# Patient Record
Sex: FEMALE | Race: WHITE | Employment: OTHER
[De-identification: names, ages, dates, MRNs, and addresses within clinical notes are randomized per-mention and may not be internally consistent; named-entity substitution may affect disease eponyms.]

---

## 2017-01-04 ENCOUNTER — OFFICE VISIT (OUTPATIENT)
Dept: PODIATRY | Facility: CLINIC | Age: 67
End: 2017-01-04

## 2017-01-04 VITALS
HEIGHT: 70 IN | SYSTOLIC BLOOD PRESSURE: 125 MMHG | WEIGHT: 180 LBS | HEART RATE: 82 BPM | BODY MASS INDEX: 25.77 KG/M2 | DIASTOLIC BLOOD PRESSURE: 75 MMHG

## 2017-01-04 DIAGNOSIS — M84.374A STRESS FRACTURE OF METATARSAL BONE, RIGHT, INITIAL ENCOUNTER: Primary | ICD-10-CM

## 2017-01-04 DIAGNOSIS — M85.80 OSTEOPENIA: ICD-10-CM

## 2017-01-04 DIAGNOSIS — Z87.312 HISTORY OF STRESS FRACTURE: ICD-10-CM

## 2017-01-04 PROCEDURE — 73630 X-RAY EXAM OF FOOT: CPT | Performed by: PODIATRIST

## 2017-01-04 PROCEDURE — 99213 OFFICE O/P EST LOW 20 MIN: CPT | Performed by: PODIATRIST

## 2017-01-04 ASSESSMENT — ENCOUNTER SYMPTOMS
COLOR CHANGE: 0
DIARRHEA: 0
VOMITING: 0
CONSTIPATION: 0
NAUSEA: 0

## 2017-01-25 ENCOUNTER — OFFICE VISIT (OUTPATIENT)
Dept: PODIATRY | Facility: CLINIC | Age: 67
End: 2017-01-25

## 2017-01-25 VITALS
HEART RATE: 78 BPM | BODY MASS INDEX: 25.77 KG/M2 | DIASTOLIC BLOOD PRESSURE: 77 MMHG | SYSTOLIC BLOOD PRESSURE: 119 MMHG | WEIGHT: 180 LBS | HEIGHT: 70 IN

## 2017-01-25 DIAGNOSIS — M84.374A STRESS FRACTURE OF METATARSAL BONE, RIGHT, INITIAL ENCOUNTER: Primary | ICD-10-CM

## 2017-01-25 DIAGNOSIS — M85.80 OSTEOPENIA: ICD-10-CM

## 2017-01-25 PROCEDURE — 99213 OFFICE O/P EST LOW 20 MIN: CPT | Performed by: PODIATRIST

## 2017-01-25 PROCEDURE — 1123F ACP DISCUSS/DSCN MKR DOCD: CPT | Performed by: PODIATRIST

## 2017-01-25 PROCEDURE — G8484 FLU IMMUNIZE NO ADMIN: HCPCS | Performed by: PODIATRIST

## 2017-01-25 PROCEDURE — 3014F SCREEN MAMMO DOC REV: CPT | Performed by: PODIATRIST

## 2017-01-25 PROCEDURE — G8399 PT W/DXA RESULTS DOCUMENT: HCPCS | Performed by: PODIATRIST

## 2017-01-25 PROCEDURE — 1090F PRES/ABSN URINE INCON ASSESS: CPT | Performed by: PODIATRIST

## 2017-01-25 PROCEDURE — 4040F PNEUMOC VAC/ADMIN/RCVD: CPT | Performed by: PODIATRIST

## 2017-01-25 PROCEDURE — 1036F TOBACCO NON-USER: CPT | Performed by: PODIATRIST

## 2017-01-25 PROCEDURE — 3017F COLORECTAL CA SCREEN DOC REV: CPT | Performed by: PODIATRIST

## 2017-01-25 PROCEDURE — G8427 DOCREV CUR MEDS BY ELIG CLIN: HCPCS | Performed by: PODIATRIST

## 2017-01-25 PROCEDURE — G8420 CALC BMI NORM PARAMETERS: HCPCS | Performed by: PODIATRIST

## 2017-01-25 ASSESSMENT — ENCOUNTER SYMPTOMS
NAUSEA: 0
VOMITING: 0
CONSTIPATION: 0
COLOR CHANGE: 0
DIARRHEA: 0

## 2017-02-07 ENCOUNTER — TELEPHONE (OUTPATIENT)
Dept: INTERNAL MEDICINE | Facility: CLINIC | Age: 67
End: 2017-02-07

## 2017-02-07 DIAGNOSIS — Z93.4 JEJUNOSTOMY TUBE PRESENT (HCC): Primary | ICD-10-CM

## 2017-02-07 DIAGNOSIS — Z93.4 SMALL BOWEL TUBE FEEDING (HCC): ICD-10-CM

## 2017-02-07 DIAGNOSIS — Z93.1 GASTROINTESTINAL TUBE PRESENT (HCC): ICD-10-CM

## 2017-02-22 ENCOUNTER — TELEPHONE (OUTPATIENT)
Dept: INTERNAL MEDICINE | Facility: CLINIC | Age: 67
End: 2017-02-22

## 2017-02-23 ENCOUNTER — TELEPHONE (OUTPATIENT)
Dept: INTERNAL MEDICINE | Facility: CLINIC | Age: 67
End: 2017-02-23

## 2017-03-03 DIAGNOSIS — K31.84 GASTROPARESIS: ICD-10-CM

## 2017-03-03 DIAGNOSIS — K90.9 INTESTINAL MALABSORPTION: ICD-10-CM

## 2017-03-03 RX ORDER — CYANOCOBALAMIN 1000 UG/ML
INJECTION INTRAMUSCULAR; SUBCUTANEOUS
Qty: 3 VIAL | Refills: 2 | Status: SHIPPED | OUTPATIENT
Start: 2017-03-03 | End: 2018-05-22 | Stop reason: SDUPTHER

## 2017-03-07 ENCOUNTER — TELEPHONE (OUTPATIENT)
Dept: INTERNAL MEDICINE | Facility: CLINIC | Age: 67
End: 2017-03-07

## 2017-04-20 ENCOUNTER — OFFICE VISIT (OUTPATIENT)
Dept: PRIMARY CARE CLINIC | Age: 67
End: 2017-04-20
Payer: MEDICARE

## 2017-04-20 VITALS
DIASTOLIC BLOOD PRESSURE: 82 MMHG | SYSTOLIC BLOOD PRESSURE: 146 MMHG | RESPIRATION RATE: 16 BRPM | WEIGHT: 183 LBS | BODY MASS INDEX: 26.2 KG/M2 | HEIGHT: 70 IN | HEART RATE: 78 BPM

## 2017-04-20 DIAGNOSIS — D80.1 COMMON VARIABLE AGAMMAGLOBULINEMIA (HCC): ICD-10-CM

## 2017-04-20 DIAGNOSIS — M85.80 OSTEOPENIA: ICD-10-CM

## 2017-04-20 DIAGNOSIS — Z93.4 JEJUNOSTOMY TUBE PRESENT (HCC): ICD-10-CM

## 2017-04-20 DIAGNOSIS — I10 ESSENTIAL HYPERTENSION: Primary | ICD-10-CM

## 2017-04-20 DIAGNOSIS — E61.1 IRON DEFICIENCY: ICD-10-CM

## 2017-04-20 DIAGNOSIS — Z11.59 NEED FOR HEPATITIS C SCREENING TEST: ICD-10-CM

## 2017-04-20 DIAGNOSIS — Z93.1 STATUS POST GASTROSTOMY (HCC): ICD-10-CM

## 2017-04-20 PROCEDURE — G8399 PT W/DXA RESULTS DOCUMENT: HCPCS | Performed by: INTERNAL MEDICINE

## 2017-04-20 PROCEDURE — 99214 OFFICE O/P EST MOD 30 MIN: CPT | Performed by: INTERNAL MEDICINE

## 2017-04-20 PROCEDURE — 4040F PNEUMOC VAC/ADMIN/RCVD: CPT | Performed by: INTERNAL MEDICINE

## 2017-04-20 PROCEDURE — 1036F TOBACCO NON-USER: CPT | Performed by: INTERNAL MEDICINE

## 2017-04-20 PROCEDURE — G8427 DOCREV CUR MEDS BY ELIG CLIN: HCPCS | Performed by: INTERNAL MEDICINE

## 2017-04-20 PROCEDURE — G8420 CALC BMI NORM PARAMETERS: HCPCS | Performed by: INTERNAL MEDICINE

## 2017-04-20 PROCEDURE — 3017F COLORECTAL CA SCREEN DOC REV: CPT | Performed by: INTERNAL MEDICINE

## 2017-04-20 PROCEDURE — 3014F SCREEN MAMMO DOC REV: CPT | Performed by: INTERNAL MEDICINE

## 2017-04-20 PROCEDURE — 1090F PRES/ABSN URINE INCON ASSESS: CPT | Performed by: INTERNAL MEDICINE

## 2017-04-20 PROCEDURE — 1123F ACP DISCUSS/DSCN MKR DOCD: CPT | Performed by: INTERNAL MEDICINE

## 2017-04-20 RX ORDER — SERTRALINE HYDROCHLORIDE 100 MG/1
TABLET, FILM COATED ORAL
Qty: 90 TABLET | Refills: 3 | Status: SHIPPED | OUTPATIENT
Start: 2017-04-20 | End: 2018-04-26 | Stop reason: SDUPTHER

## 2017-04-20 RX ORDER — METRONIDAZOLE 250 MG/1
250 TABLET ORAL
COMMUNITY
Start: 2017-04-19 | End: 2017-05-03

## 2017-04-20 RX ORDER — CIPROFLOXACIN 500 MG/1
TABLET, FILM COATED ORAL
COMMUNITY
Start: 2017-04-19 | End: 2018-03-11 | Stop reason: SDUPTHER

## 2017-04-20 ASSESSMENT — ENCOUNTER SYMPTOMS
BLURRED VISION: 0
SHORTNESS OF BREATH: 0
ORTHOPNEA: 0

## 2017-04-21 LAB
FERRITIN: 7 NG/ML (ref 9–150)
TSH SERPL DL<=0.05 MIU/L-ACNC: 2.69 UIU/ML

## 2017-04-25 DIAGNOSIS — E61.1 IRON DEFICIENCY: ICD-10-CM

## 2017-04-25 DIAGNOSIS — I10 ESSENTIAL HYPERTENSION: ICD-10-CM

## 2017-04-26 ENCOUNTER — TELEPHONE (OUTPATIENT)
Dept: PRIMARY CARE CLINIC | Age: 67
End: 2017-04-26

## 2017-04-26 DIAGNOSIS — Z11.59 NEED FOR HEPATITIS C SCREENING TEST: ICD-10-CM

## 2017-08-03 RX ORDER — SYRINGE WITH NEEDLE, 1 ML 25GX5/8"
SYRINGE, EMPTY DISPOSABLE MISCELLANEOUS
Qty: 100 EACH | Refills: 3 | Status: SHIPPED | OUTPATIENT
Start: 2017-08-03 | End: 2018-08-20 | Stop reason: SDUPTHER

## 2017-08-07 DIAGNOSIS — Z12.31 ENCOUNTER FOR SCREENING MAMMOGRAM FOR BREAST CANCER: Primary | ICD-10-CM

## 2017-08-15 ENCOUNTER — HOSPITAL ENCOUNTER (OUTPATIENT)
Dept: MAMMOGRAPHY | Age: 67
Discharge: HOME | End: 2017-08-15
Payer: MEDICARE

## 2017-08-15 DIAGNOSIS — R92.8 ABNORMAL MAMMOGRAM OF RIGHT BREAST: Primary | ICD-10-CM

## 2017-08-15 DIAGNOSIS — Z12.31 ENCOUNTER FOR SCREENING MAMMOGRAM FOR BREAST CANCER: ICD-10-CM

## 2017-08-15 PROCEDURE — G0202 SCR MAMMO BI INCL CAD: HCPCS

## 2017-08-16 ENCOUNTER — TELEPHONE (OUTPATIENT)
Dept: PRIMARY CARE CLINIC | Age: 67
End: 2017-08-16

## 2017-08-22 ENCOUNTER — HOSPITAL ENCOUNTER (OUTPATIENT)
Dept: MAMMOGRAPHY | Age: 67
Discharge: HOME | End: 2017-08-22
Payer: MEDICARE

## 2017-08-22 ENCOUNTER — HOSPITAL ENCOUNTER (OUTPATIENT)
Dept: ULTRASOUND IMAGING | Age: 67
End: 2017-08-22
Payer: MEDICARE

## 2017-08-22 DIAGNOSIS — R92.8 ABNORMAL MAMMOGRAM OF RIGHT BREAST: ICD-10-CM

## 2017-08-22 PROCEDURE — G0279 TOMOSYNTHESIS, MAMMO: HCPCS

## 2017-08-25 ENCOUNTER — TELEPHONE (OUTPATIENT)
Dept: PRIMARY CARE CLINIC | Age: 67
End: 2017-08-25

## 2017-09-05 ENCOUNTER — TELEPHONE (OUTPATIENT)
Dept: PRIMARY CARE CLINIC | Age: 67
End: 2017-09-05

## 2017-09-20 LAB
CHOLESTEROL, TOTAL: 192 MG/DL
CHOLESTEROL/HDL RATIO: 3.4
HDLC SERPL-MCNC: 57 MG/DL (ref 35–70)
LDL CHOLESTEROL CALCULATED: 107 MG/DL (ref 0–160)
TRIGL SERPL-MCNC: 140 MG/DL
VLDLC SERPL CALC-MCNC: NORMAL MG/DL

## 2017-09-21 DIAGNOSIS — I10 ESSENTIAL HYPERTENSION: ICD-10-CM

## 2017-09-22 ENCOUNTER — TELEPHONE (OUTPATIENT)
Dept: PRIMARY CARE CLINIC | Age: 67
End: 2017-09-22

## 2017-10-26 ENCOUNTER — OFFICE VISIT (OUTPATIENT)
Dept: PRIMARY CARE CLINIC | Age: 67
End: 2017-10-26
Payer: MEDICARE

## 2017-10-26 VITALS
DIASTOLIC BLOOD PRESSURE: 72 MMHG | RESPIRATION RATE: 18 BRPM | HEIGHT: 70 IN | HEART RATE: 80 BPM | WEIGHT: 180.6 LBS | BODY MASS INDEX: 25.86 KG/M2 | SYSTOLIC BLOOD PRESSURE: 112 MMHG

## 2017-10-26 DIAGNOSIS — Z93.4 JEJUNOSTOMY TUBE PRESENT (HCC): ICD-10-CM

## 2017-10-26 DIAGNOSIS — Z23 NEED FOR INFLUENZA VACCINATION: ICD-10-CM

## 2017-10-26 DIAGNOSIS — I10 ESSENTIAL HYPERTENSION: Primary | ICD-10-CM

## 2017-10-26 DIAGNOSIS — M79.7 FIBROMYALGIA: ICD-10-CM

## 2017-10-26 DIAGNOSIS — K31.84 GASTROPARESIS: ICD-10-CM

## 2017-10-26 DIAGNOSIS — M67.442 GANGLION CYST OF FLEXOR TENDON SHEATH OF FINGER OF LEFT HAND: ICD-10-CM

## 2017-10-26 DIAGNOSIS — I78.1 ASYMPTOMATIC SPIDER VEINS OF BOTH LOWER EXTREMITIES: ICD-10-CM

## 2017-10-26 PROCEDURE — G8419 CALC BMI OUT NRM PARAM NOF/U: HCPCS | Performed by: NURSE PRACTITIONER

## 2017-10-26 PROCEDURE — 1090F PRES/ABSN URINE INCON ASSESS: CPT | Performed by: NURSE PRACTITIONER

## 2017-10-26 PROCEDURE — 3288F FALL RISK ASSESSMENT DOCD: CPT | Performed by: NURSE PRACTITIONER

## 2017-10-26 PROCEDURE — 1036F TOBACCO NON-USER: CPT | Performed by: NURSE PRACTITIONER

## 2017-10-26 PROCEDURE — G8484 FLU IMMUNIZE NO ADMIN: HCPCS | Performed by: NURSE PRACTITIONER

## 2017-10-26 PROCEDURE — G8399 PT W/DXA RESULTS DOCUMENT: HCPCS | Performed by: NURSE PRACTITIONER

## 2017-10-26 PROCEDURE — 3014F SCREEN MAMMO DOC REV: CPT | Performed by: NURSE PRACTITIONER

## 2017-10-26 PROCEDURE — G8427 DOCREV CUR MEDS BY ELIG CLIN: HCPCS | Performed by: NURSE PRACTITIONER

## 2017-10-26 PROCEDURE — 4040F PNEUMOC VAC/ADMIN/RCVD: CPT | Performed by: NURSE PRACTITIONER

## 2017-10-26 PROCEDURE — 99214 OFFICE O/P EST MOD 30 MIN: CPT | Performed by: NURSE PRACTITIONER

## 2017-10-26 PROCEDURE — 3017F COLORECTAL CA SCREEN DOC REV: CPT | Performed by: NURSE PRACTITIONER

## 2017-10-26 PROCEDURE — G8510 SCR DEP NEG, NO PLAN REQD: HCPCS | Performed by: NURSE PRACTITIONER

## 2017-10-26 PROCEDURE — G0008 ADMIN INFLUENZA VIRUS VAC: HCPCS | Performed by: NURSE PRACTITIONER

## 2017-10-26 PROCEDURE — 1123F ACP DISCUSS/DSCN MKR DOCD: CPT | Performed by: NURSE PRACTITIONER

## 2017-10-26 PROCEDURE — 90688 IIV4 VACCINE SPLT 0.5 ML IM: CPT | Performed by: NURSE PRACTITIONER

## 2017-10-26 RX ORDER — HYDROCODONE BITARTRATE AND ACETAMINOPHEN 5; 325 MG/1; MG/1
1 TABLET ORAL EVERY 6 HOURS PRN
Qty: 30 TABLET | Refills: 0 | Status: SHIPPED | OUTPATIENT
Start: 2017-10-26 | End: 2018-04-26 | Stop reason: ALTCHOICE

## 2017-10-26 RX ORDER — IBUPROFEN 600 MG/1
600 TABLET ORAL EVERY 8 HOURS PRN
Qty: 270 TABLET | Refills: 3 | Status: SHIPPED | OUTPATIENT
Start: 2017-10-26 | End: 2018-12-09 | Stop reason: SDUPTHER

## 2017-10-26 RX ORDER — HYDROCODONE BITARTRATE AND ACETAMINOPHEN 5; 325 MG/1; MG/1
1 TABLET ORAL EVERY 6 HOURS PRN
COMMUNITY
End: 2017-10-26 | Stop reason: SDUPTHER

## 2017-10-26 ASSESSMENT — PATIENT HEALTH QUESTIONNAIRE - PHQ9
1. LITTLE INTEREST OR PLEASURE IN DOING THINGS: 0
2. FEELING DOWN, DEPRESSED OR HOPELESS: 0
SUM OF ALL RESPONSES TO PHQ QUESTIONS 1-9: 0
SUM OF ALL RESPONSES TO PHQ9 QUESTIONS 1 & 2: 0

## 2017-10-26 ASSESSMENT — ENCOUNTER SYMPTOMS
WHEEZING: 0
COUGH: 0
CONSTIPATION: 0
VOMITING: 0
TROUBLE SWALLOWING: 0
SINUS PRESSURE: 0
ABDOMINAL PAIN: 0
DIARRHEA: 0
BLOOD IN STOOL: 0
SORE THROAT: 0
NAUSEA: 0
SHORTNESS OF BREATH: 0
BLURRED VISION: 0

## 2017-10-26 NOTE — PROGRESS NOTES
09/20/2022    DTaP/Tdap/Td vaccine (2 - Td) 04/01/2026    DEXA (modify frequency per FRAX score)  Completed    Hepatitis C screen  Completed

## 2017-10-26 NOTE — PROGRESS NOTES
[facility] [address]  [address]  [facility] [address]  Dept: [phone]  Dept Fax: [phone]    Elaine Jung is a 78 y.o. female who presents today for her medical conditions/complaints as noted below. Elaine Jung is c/o of   Chief Complaint   Patient presents with    Hypertension     6 month visit    Cyst     has several raised areas on palms of hands    Other     would like a script for vicodin and ibuprofen           HPI:     Here today for follow up  Asking for refill on norco, has not had rx filled for almost 2 years and recently ran out  She uses rarely for abdominal discomfort  Also asking for prescription for Motrin tablet  Has to crush the over-the-counter ibuprofen and has been having difficulty dissolving effectively in order to get through her J-tube    Reports has stopped using her ativan, weaned off over a 3 month period of time    Asking about a lump/cyst she has noticed on her palm of her left hand, has tenderness in the area at time, denies any restriction of movement with her fingers    Voicing concern about several spider veins and varicosities on both of her lower legs        Hypertension   This is a chronic problem. The current episode started more than 1 year ago. The problem is controlled. Pertinent negatives include no blurred vision, chest pain, headaches, palpitations, peripheral edema or shortness of breath. Past treatments include diuretics and calcium channel blockers. The current treatment provides significant improvement. There are no compliance problems. There is no history of CAD/MI or CVA.        No results found for: LABA1C          ( goal A1C is < 7)   No results found for: LABMICR  LDL Calculated (mg/dL)   Date Value   09/20/2017 107       (goal LDL is <100)   AST (U/L)   Date Value   11/16/2015 17     ALT (U/L)   Date Value   11/16/2015 14     BUN (mg/dL)   Date Value   04/15/2015 9     BP Readings from Last 3 tobacco: Never Used    Alcohol use No      Current Outpatient Prescriptions   Medication Sig Dispense Refill    HYDROcodone-acetaminophen (NORCO) 5-325 MG per tablet Take 1 tablet by mouth every 6 hours as needed for Pain . 30 tablet 0    ibuprofen (ADVIL;MOTRIN) 600 MG tablet Take 1 tablet by mouth every 8 hours as needed for Pain 270 tablet 3    B-D 3CC LUER-JUSTIN SYR 21GX1\" 21G X 1\" 3 ML MISC use as directed WITH VIT B EVERY MONTH 100 each 3    sertraline (ZOLOFT) 100 MG tablet Take 1 tablet by mouth  daily 90 tablet 3    lactobacillus (BACID) TABS take 1 tablet by mouth three times a day 180 tablet 3    cyanocobalamin 1000 MCG/ML injection INJECT 1ML INTO THE MUSCLE EVERY 30 DAYS ON THE FIRST OF THE MONTH 3 vial 2    vitamin D3 (CHOLECALCIFEROL) 5000 UNITS TABS tablet 0 capsules      hydrochlorothiazide (HYDRODIURIL) 25 MG tablet Take 0.5 tablets by mouth daily 45 tablet 3    nystatin (MYCOSTATIN) 387787 UNIT/GM ointment Apply topically 2 times daily. 30 g 2    fluconazole (DIFLUCAN) 100 MG tablet Take 1 tablet by mouth as needed (TAKES WHIN ON ATB TO AVOID THRUSH) 10 tablet 0    diltiazem (CARDIZEM) 60 MG tablet Take 60 mg by mouth 2 times daily       loperamide (IMODIUM) 2 MG capsule Take 2 mg by mouth 4 times daily as needed for Diarrhea (TAKES WITH LOMOTIL)      Nutritional Supplements (PEPTAMEN AF) LIQD Take by mouth Indications: TUBE FEED 1500 CALORIES A DAY      ondansetron (ZOFRAN) 8 MG tablet   Take 8 mg by mouth every 8 hours as needed       EPINEPHrine 0.3 MG/0.3ML SOAJ injection Inject 0.3 mg into the muscle.  Immune Globulin, Human, (HIZENTRA) 10 GM/50ML SOLN   Inject 10 g into the skin every 7 days THURSDAYS      Cholecalciferol (VITAMIN D-3) 5000 UNITS TABS Take 1 tablet by mouth daily.  amitriptyline (ELAVIL) 100 MG tablet Take 100 mg by mouth nightly.  Diphenoxylate-Atropine (LOMOTIL PO) Take  by mouth as needed.       flecainide (TAMBOCOR) 50 MG tablet Take 50 mg by mouth daily.  dicyclomine (BENTYL) 20 MG tablet Take 20 mg by mouth every 6 hours as needed.  ciprofloxacin (CIPRO) 500 MG tablet        No current facility-administered medications for this visit. Allergies   Allergen Reactions    Macrodantin [Nitrofurantoin Macrocrystal] Hives    Compazine [Prochlorperazine] Other (See Comments)     HYPER    Phenergan [Promethazine Hcl] Other (See Comments)     HYPER    Reglan [Metoclopramide] Other (See Comments)     HYPER, AGGITATED      Sulfa Antibiotics Rash    Vancomycin Other (See Comments)     HALLUCINATON    Vfend [Voriconazole] Other (See Comments)     hallucinations       Health Maintenance   Topic Date Due    Pneumococcal high/highest risk (2 of 2 - PPSV23) 09/25/2018    Breast cancer screen  08/22/2019    Colon cancer screen colonoscopy  01/12/2022    Lipid screen  09/20/2022    DTaP/Tdap/Td vaccine (2 - Td) 04/01/2026    DEXA (modify frequency per FRAX score)  Completed    Flu vaccine  Completed    Hepatitis C screen  Completed       Subjective:      Review of Systems   Constitutional: Negative for activity change, appetite change, chills, fatigue, fever and unexpected weight change. HENT: Negative for congestion, ear pain, hearing loss, sinus pressure, sore throat and trouble swallowing. Eyes: Negative for blurred vision and visual disturbance. Respiratory: Negative for cough, shortness of breath and wheezing. Cardiovascular: Negative for chest pain, palpitations and leg swelling. Gastrointestinal: Negative for abdominal pain, blood in stool, constipation, diarrhea, nausea and vomiting. Endocrine: Negative for cold intolerance, heat intolerance, polydipsia, polyphagia and polyuria. Genitourinary: Negative for difficulty urinating, frequency, hematuria and urgency. Musculoskeletal: Positive for arthralgias. Negative for myalgias. Skin: Negative for rash. Allergic/Immunologic: Negative for environmental allergies.

## 2017-11-09 ENCOUNTER — TELEPHONE (OUTPATIENT)
Dept: VASCULAR SURGERY | Age: 67
End: 2017-11-09

## 2017-11-14 ENCOUNTER — INITIAL CONSULT (OUTPATIENT)
Dept: VASCULAR SURGERY | Age: 67
End: 2017-11-14
Payer: MEDICARE

## 2017-11-14 VITALS
HEART RATE: 79 BPM | SYSTOLIC BLOOD PRESSURE: 133 MMHG | RESPIRATION RATE: 19 BRPM | DIASTOLIC BLOOD PRESSURE: 75 MMHG | TEMPERATURE: 97.4 F | OXYGEN SATURATION: 98 %

## 2017-11-14 DIAGNOSIS — G25.81 RESTLESS LEG SYNDROME: ICD-10-CM

## 2017-11-14 DIAGNOSIS — I83.893 VARICOSE VEINS OF BOTH LEGS WITH EDEMA: Primary | ICD-10-CM

## 2017-11-14 PROCEDURE — 99204 OFFICE O/P NEW MOD 45 MIN: CPT | Performed by: SURGERY

## 2017-11-14 PROCEDURE — 4040F PNEUMOC VAC/ADMIN/RCVD: CPT | Performed by: SURGERY

## 2017-11-14 PROCEDURE — 1090F PRES/ABSN URINE INCON ASSESS: CPT | Performed by: SURGERY

## 2017-11-14 PROCEDURE — 3014F SCREEN MAMMO DOC REV: CPT | Performed by: SURGERY

## 2017-11-14 PROCEDURE — G8484 FLU IMMUNIZE NO ADMIN: HCPCS | Performed by: SURGERY

## 2017-11-14 PROCEDURE — G8419 CALC BMI OUT NRM PARAM NOF/U: HCPCS | Performed by: SURGERY

## 2017-11-14 PROCEDURE — G8427 DOCREV CUR MEDS BY ELIG CLIN: HCPCS | Performed by: SURGERY

## 2017-11-14 PROCEDURE — 3017F COLORECTAL CA SCREEN DOC REV: CPT | Performed by: SURGERY

## 2017-11-14 NOTE — PROGRESS NOTES
Diphenoxylate-Atropine (LOMOTIL PO) Take  by mouth as needed. Yes Historical Provider, MD   flecainide (TAMBOCOR) 50 MG tablet Take 50 mg by mouth daily. Yes Historical Provider, MD   dicyclomine (BENTYL) 20 MG tablet Take 20 mg by mouth every 6 hours as needed. Yes Historical Provider, MD   ibuprofen (ADVIL;MOTRIN) 600 MG tablet Take 1 tablet by mouth every 8 hours as needed for Pain 10/26/17   Argelia Edwards CNP   B-D 3CC LUER-JUSTIN SYR 21GX1\" 21G X 1\" 3 ML MISC use as directed WITH VIT B EVERY MONTH 8/3/17   Mary Hoffman CNP   ciprofloxacin (CIPRO) 500 MG tablet  4/19/17   Historical Provider, MD   lactobacillus (BACID) TABS take 1 tablet by mouth three times a day 4/20/17   Norm Patten DO   hydrochlorothiazide (HYDRODIURIL) 25 MG tablet Take 0.5 tablets by mouth daily 10/25/16   Mary Hoffman CNP   nystatin (MYCOSTATIN) 872131 UNIT/GM ointment Apply topically 2 times daily. 5/19/16   Ibrahima Stacey,    fluconazole (DIFLUCAN) 100 MG tablet Take 1 tablet by mouth as needed (TAKES WHIN ON ATB TO AVOID THRUSH) 1/27/16   Norm Patten DO   loperamide (IMODIUM) 2 MG capsule Take 2 mg by mouth 4 times daily as needed for Diarrhea (TAKES WITH LOMOTIL)    Historical Provider, MD   Nutritional Supplements (PEPTAMEN AF) LIQD Take by mouth Indications: TUBE FEED [address]    Historical Provider, MD        Allergies:       Macrodantin [nitrofurantoin macrocrystal]; Compazine [prochlorperazine]; Phenergan [promethazine hcl]; Reglan [metoclopramide]; Sulfa antibiotics; Vancomycin; and Vfend [voriconazole]    Social History:     Tobacco:    reports that she has never smoked. She has never used smokeless tobacco.  Alcohol:      reports that she does not drink alcohol. Drug Use:  reports that she does not use drugs.     Family History:     Family History   Problem Relation Age of Onset    Hypertension Mother     Heart Disease Paternal Grandfather     Stroke Father     Cancer Brother KIDNEY AND PROSTATE    Colon Cancer Maternal Uncle     Cancer Maternal Grandmother      NON HODGINS LYMPHOMA    Cancer Maternal Grandfather      LUNG AND ESOPHAGEAL       Review of Systems:     Positive and Negative as described in HPI      Constitutional:  negative for  fevers, chills, sweats, fatigue, and weight loss  HEENT:  negative for vision or hearing changes,   Respiratory:  negative for shortness of breath, cough, or congestion  Cardiovascular:  negative for  chest pain, palpitations  Gastrointestinal:  negative for nausea, vomiting, diarrhea, constipation, abdominal pain  Genitourinary:  negative for frequency, dysuria  Integument:  negative for rash, skin lesions  Chest/Breast:  No painful inspiration or expiration, no rib sternal pain  Musculoskeletal:  negative for muscle aches or joint pain  Neurological:  negative for headaches, dizziness, lightheadedness, numbness, pain and tingling extremities  Lymphatics: no lymphadenopathy or painful masses  Behavior/Psych:  negative for depression and anxiety      Physical Exam:     Vitals:  /75 (Site: Left Arm, Position: Sitting, Cuff Size: Medium Adult)   Pulse 79   Temp 97.4 °F (36.3 °C) (Oral)   Resp 19   SpO2 98%       General appearance - alert, well appearing and in no acute distress  Mental status - oriented to person, place and time with normal affect  Head - normocephalic and atraumatic  Eyes - pupils equal and reactive, extraocular eye movements intact, conjunctiva clear  Ears - hearing appears to be intact  Nose - no drainage noted  Mouth - mucous membranes moist  Neck - supple, no carotid bruits, thyroid not palpable, no JVD  Chest - clear to auscultation, normal effort  Heart - normal rate, regular rhythm, no murmurs  Abdomen - soft, non-tender, non-distended, bowel sounds present all four quadrants, no masses  Neurological - normal speech, no focal findings or movement disorder noted, cranial nerves II through XII grossly

## 2017-11-29 ENCOUNTER — HOSPITAL ENCOUNTER (OUTPATIENT)
Dept: VASCULAR LAB | Age: 67
Discharge: HOME | End: 2017-11-29
Payer: MEDICARE

## 2017-11-29 DIAGNOSIS — I83.893 VARICOSE VEINS OF BOTH LEGS WITH EDEMA: ICD-10-CM

## 2017-11-29 DIAGNOSIS — G25.81 RESTLESS LEG SYNDROME: ICD-10-CM

## 2017-11-29 PROCEDURE — 93970 EXTREMITY STUDY: CPT

## 2018-01-08 ENCOUNTER — HOSPITAL ENCOUNTER (OUTPATIENT)
Dept: ULTRASOUND IMAGING | Age: 68
Discharge: HOME | End: 2018-01-08
Payer: MEDICARE

## 2018-01-08 DIAGNOSIS — L02.91 ABSCESS: ICD-10-CM

## 2018-01-08 PROCEDURE — 76705 ECHO EXAM OF ABDOMEN: CPT

## 2018-01-09 ENCOUNTER — PATIENT MESSAGE (OUTPATIENT)
Dept: INFECTIOUS DISEASES | Age: 68
End: 2018-01-09

## 2018-01-09 NOTE — TELEPHONE ENCOUNTER
Dr Fifi Kaur, patient was last seen by you in the office on 8/1/16. She is scheduled for an appointment with you on 1/29/18. Please review my chart message and advise how to proceed.  Would you like to see patient sooner? (also routed to Radha Fontanez as it appears she spoke with patient today)

## 2018-01-15 ENCOUNTER — OFFICE VISIT (OUTPATIENT)
Dept: INFECTIOUS DISEASES | Age: 68
End: 2018-01-15
Payer: MEDICARE

## 2018-01-15 ENCOUNTER — HOSPITAL ENCOUNTER (OUTPATIENT)
Age: 68
Setting detail: SPECIMEN
Discharge: HOME | End: 2018-01-15
Payer: MEDICARE

## 2018-01-15 VITALS
DIASTOLIC BLOOD PRESSURE: 91 MMHG | WEIGHT: 179.6 LBS | SYSTOLIC BLOOD PRESSURE: 170 MMHG | HEART RATE: 78 BPM | BODY MASS INDEX: 26.14 KG/M2 | OXYGEN SATURATION: 100 % | TEMPERATURE: 97 F | RESPIRATION RATE: 16 BRPM

## 2018-01-15 DIAGNOSIS — L03.311 ABDOMINAL WALL CELLULITIS: ICD-10-CM

## 2018-01-15 DIAGNOSIS — B36.9 FUNGAL INFECTION OF SKIN: ICD-10-CM

## 2018-01-15 DIAGNOSIS — L02.91 ABSCESS: Primary | ICD-10-CM

## 2018-01-15 PROCEDURE — 1090F PRES/ABSN URINE INCON ASSESS: CPT | Performed by: INTERNAL MEDICINE

## 2018-01-15 PROCEDURE — G8427 DOCREV CUR MEDS BY ELIG CLIN: HCPCS | Performed by: INTERNAL MEDICINE

## 2018-01-15 PROCEDURE — 99214 OFFICE O/P EST MOD 30 MIN: CPT | Performed by: INTERNAL MEDICINE

## 2018-01-15 PROCEDURE — G8399 PT W/DXA RESULTS DOCUMENT: HCPCS | Performed by: INTERNAL MEDICINE

## 2018-01-15 PROCEDURE — 1123F ACP DISCUSS/DSCN MKR DOCD: CPT | Performed by: INTERNAL MEDICINE

## 2018-01-15 PROCEDURE — 3014F SCREEN MAMMO DOC REV: CPT | Performed by: INTERNAL MEDICINE

## 2018-01-15 PROCEDURE — G8484 FLU IMMUNIZE NO ADMIN: HCPCS | Performed by: INTERNAL MEDICINE

## 2018-01-15 PROCEDURE — G8419 CALC BMI OUT NRM PARAM NOF/U: HCPCS | Performed by: INTERNAL MEDICINE

## 2018-01-15 PROCEDURE — 1036F TOBACCO NON-USER: CPT | Performed by: INTERNAL MEDICINE

## 2018-01-15 PROCEDURE — 4040F PNEUMOC VAC/ADMIN/RCVD: CPT | Performed by: INTERNAL MEDICINE

## 2018-01-15 PROCEDURE — 3017F COLORECTAL CA SCREEN DOC REV: CPT | Performed by: INTERNAL MEDICINE

## 2018-01-15 RX ORDER — CLOTRIMAZOLE AND BETAMETHASONE DIPROPIONATE 10; .64 MG/G; MG/G
CREAM TOPICAL
Qty: 1 TUBE | Refills: 1 | Status: SHIPPED | OUTPATIENT
Start: 2018-01-15 | End: 2018-06-05 | Stop reason: SDUPTHER

## 2018-01-15 NOTE — PROGRESS NOTES
Rfl: 3    B-D 3CC LUER-JUSTIN SYR 21GX1\" 21G X 1\" 3 ML MISC, use as directed WITH VIT B EVERY MONTH, Disp: 100 each, Rfl: 3    ciprofloxacin (CIPRO) 500 MG tablet, , Disp: , Rfl:     sertraline (ZOLOFT) 100 MG tablet, Take 1 tablet by mouth  daily, Disp: 90 tablet, Rfl: 3    lactobacillus (BACID) TABS, take 1 tablet by mouth three times a day, Disp: 180 tablet, Rfl: 3    cyanocobalamin 1000 MCG/ML injection, INJECT 1ML INTO THE MUSCLE EVERY 30 DAYS ON THE FIRST OF THE MONTH, Disp: 3 vial, Rfl: 2    vitamin D3 (CHOLECALCIFEROL) 5000 UNITS TABS tablet, 0 capsules, Disp: , Rfl:     hydrochlorothiazide (HYDRODIURIL) 25 MG tablet, Take 0.5 tablets by mouth daily, Disp: 45 tablet, Rfl: 3    nystatin (MYCOSTATIN) 623007 UNIT/GM ointment, Apply topically 2 times daily. , Disp: 30 g, Rfl: 2    fluconazole (DIFLUCAN) 100 MG tablet, Take 1 tablet by mouth as needed (TAKES WHIN ON ATB TO AVOID THRUSH), Disp: 10 tablet, Rfl: 0    diltiazem (CARDIZEM) 60 MG tablet, Take 60 mg by mouth 2 times daily , Disp: , Rfl:     loperamide (IMODIUM) 2 MG capsule, Take 2 mg by mouth 4 times daily as needed for Diarrhea (TAKES WITH LOMOTIL), Disp: , Rfl:     Nutritional Supplements (PEPTAMEN AF) LIQD, Take by mouth Indications: TUBE FEED 1500 CALORIES A DAY, Disp: , Rfl:     ondansetron (ZOFRAN) 8 MG tablet,  Take 8 mg by mouth every 8 hours as needed , Disp: , Rfl:     EPINEPHrine 0.3 MG/0.3ML SOAJ injection, Inject 0.3 mg into the muscle., Disp: , Rfl:     Immune Globulin, Human, (HIZENTRA) 10 GM/50ML SOLN,  Inject 10 g into the skin every 7 days THURSDAYS, Disp: , Rfl:     Cholecalciferol (VITAMIN D-3) 5000 UNITS TABS, Take 1 tablet by mouth daily. , Disp: , Rfl:     amitriptyline (ELAVIL) 100 MG tablet, Take 100 mg by mouth nightly., Disp: , Rfl:     Diphenoxylate-Atropine (LOMOTIL PO), Take  by mouth as needed. , Disp: , Rfl:     flecainide (TAMBOCOR) 50 MG tablet, Take 50 mg by mouth daily. , Disp: , Rfl:     dicyclomine

## 2018-01-18 LAB
CULTURE: ABNORMAL
DIRECT EXAM: ABNORMAL
Lab: ABNORMAL
ORGANISM: ABNORMAL
SPECIMEN DESCRIPTION: ABNORMAL
STATUS: ABNORMAL

## 2018-01-22 ASSESSMENT — ENCOUNTER SYMPTOMS
ALLERGIC/IMMUNOLOGIC NEGATIVE: 1
GASTROINTESTINAL NEGATIVE: 1
RESPIRATORY NEGATIVE: 1
EYES NEGATIVE: 1

## 2018-01-29 ENCOUNTER — OFFICE VISIT (OUTPATIENT)
Dept: INFECTIOUS DISEASES | Age: 68
End: 2018-01-29
Payer: MEDICARE

## 2018-01-29 VITALS
HEIGHT: 70 IN | OXYGEN SATURATION: 100 % | BODY MASS INDEX: 25.74 KG/M2 | DIASTOLIC BLOOD PRESSURE: 88 MMHG | RESPIRATION RATE: 18 BRPM | SYSTOLIC BLOOD PRESSURE: 157 MMHG | TEMPERATURE: 97.5 F | HEART RATE: 82 BPM | WEIGHT: 179.8 LBS

## 2018-01-29 DIAGNOSIS — B36.9 FUNGAL INFECTION OF SKIN: Primary | ICD-10-CM

## 2018-01-29 PROCEDURE — 1123F ACP DISCUSS/DSCN MKR DOCD: CPT | Performed by: INTERNAL MEDICINE

## 2018-01-29 PROCEDURE — G8484 FLU IMMUNIZE NO ADMIN: HCPCS | Performed by: INTERNAL MEDICINE

## 2018-01-29 PROCEDURE — 99214 OFFICE O/P EST MOD 30 MIN: CPT | Performed by: INTERNAL MEDICINE

## 2018-01-29 PROCEDURE — 3017F COLORECTAL CA SCREEN DOC REV: CPT | Performed by: INTERNAL MEDICINE

## 2018-01-29 PROCEDURE — 1036F TOBACCO NON-USER: CPT | Performed by: INTERNAL MEDICINE

## 2018-01-29 PROCEDURE — G8427 DOCREV CUR MEDS BY ELIG CLIN: HCPCS | Performed by: INTERNAL MEDICINE

## 2018-01-29 PROCEDURE — G8399 PT W/DXA RESULTS DOCUMENT: HCPCS | Performed by: INTERNAL MEDICINE

## 2018-01-29 PROCEDURE — G8419 CALC BMI OUT NRM PARAM NOF/U: HCPCS | Performed by: INTERNAL MEDICINE

## 2018-01-29 PROCEDURE — 1090F PRES/ABSN URINE INCON ASSESS: CPT | Performed by: INTERNAL MEDICINE

## 2018-01-29 PROCEDURE — 3014F SCREEN MAMMO DOC REV: CPT | Performed by: INTERNAL MEDICINE

## 2018-01-29 PROCEDURE — 4040F PNEUMOC VAC/ADMIN/RCVD: CPT | Performed by: INTERNAL MEDICINE

## 2018-01-29 NOTE — LETTER
Past Medical History:     Past Medical History:   Diagnosis Date    Abdominal pain     Anxiety     CVID (common variable immunodeficiency) ([address]     Depression     Diarrhea     Difficulty swallowing     Dizziness     Fibromyalgia     Gastroparesis     Headache     History of blood transfusion     Hypogammaglobulinaemia, unspecified 2013    Irritable bowel syndrome     Jejunostomy tube present (HCC)     USES TO FEED SELF , PEPTAMEN AF 1500 BESSY A DAY    Nausea & vomiting     Numbness     RLS (restless legs syndrome)     S/P percutaneous endoscopic gastrostomy (PEG) tube placement ([address]     USES TO DRAIN STOMACH    Sepsis ([address] 06/2006    SVT (supraventricular tachycardia) ([address] 2006    ON RX    Wears glasses     Wears glasses        Past Surgical  History:     Past Surgical History:   Procedure Laterality Date    BREAST BIOPSY Left 8/13/15    BUNIONECTOMY Right 1985    FOOT    CHOLECYSTECTOMY  1972    ENDOMETRIAL ABLATION  2005    GASTRIC FUNDOPLICATION  0640    sx that injured the vagus nerves and caused gastroparesis    GASTROSTOMY TUBE PLACEMENT  2004    for stomach drainage    ILEOSTOMY OR JEJUNOSTOMY  2005    J-tube for feeding    KNEE SURGERY Right 1986    tumor    PYLOROPLASTY  2005    done to help gastroparesis fr vagus nerve injury    TONSILLECTOMY AND ADENOIDECTOMY  1963    TUNNELED VENOUS PORT PLACEMENT  2004    REMOVED 2 YRS LATER    UPPER GASTROINTESTINAL ENDOSCOPY  1993-2012    X 15 SOME WITH BX., SOME FOR DILATATION       Medications:     Current Outpatient Prescriptions:     clotrimazole-betamethasone (LOTRISONE) 1-0.05 % cream, Apply topically 2 times daily. , Disp: 1 Tube, Rfl: 1    Elastic Bandages & Supports (MEDICAL COMPRESSION STOCKINGS) MISC, Knee high 20 -30 mm hg, Disp: 1 each, Rfl: 2    HYDROcodone-acetaminophen (NORCO) 5-325 MG per tablet, Take 1 tablet by mouth every 6 hours as needed for Pain ., Disp: 30 tablet, Rfl: 0   Diphenoxylate-Atropine (LOMOTIL PO), Take  by mouth as needed. , Disp: , Rfl:     flecainide (TAMBOCOR) 50 MG tablet, Take 50 mg by mouth daily. , Disp: , Rfl:     dicyclomine (BENTYL) 20 MG tablet, Take 20 mg by mouth every 6 hours as needed. , Disp: , Rfl:       Social History:     Social History     Social History    Marital status:      Spouse name: N/A    Number of children: N/A    Years of education: N/A     Occupational History    Not on file. Social History Main Topics    Smoking status: Never Smoker    Smokeless tobacco: Never Used    Alcohol use No    Drug use: No    Sexual activity: Not on file     Other Topics Concern    Not on file     Social History Narrative    No narrative on file       Family History:     Family History   Problem Relation Age of Onset    Hypertension Mother     Heart Disease Paternal Grandfather     Stroke Father     Cancer Brother      KIDNEY AND PROSTATE    Colon Cancer Maternal Uncle     Cancer Maternal Grandmother      NON HODGINS LYMPHOMA    Cancer Maternal Grandfather      LUNG AND ESOPHAGEAL        Allergies:   Macrodantin [nitrofurantoin macrocrystal]; Compazine [prochlorperazine]; Phenergan [promethazine hcl]; Reglan [metoclopramide]; Sulfa antibiotics; Vancomycin; and Vfend [voriconazole]     Review of Systems:   Review of Systems   Constitutional: Negative. HENT: Negative. Eyes: Negative. Cardiovascular: Negative. Gastrointestinal:        PEG site. This irritation has resolved, CHRISTOFER site is red at this time. No discharge   Endocrine: Negative. Genitourinary: Negative. Musculoskeletal: Negative. Skin: Negative. Allergic/Immunologic:        Left axillary palpable lymph node   Neurological: Negative. Hematological: Negative. Psychiatric/Behavioral: Negative. Physical Examination :   Blood pressure (!) 157/88, pulse 82, temperature 97.5 °F (36.4 °C), resp. LABALBU 3.9 11/16/2015    BILIDIR <0.08 11/16/2015    IBILI CANNOT BE CALCULATED 11/16/2015    BILITOT 0.23 11/16/2015    ALKPHOS 109 11/16/2015    ALT 14 11/16/2015    AST 17 11/16/2015     No results found for: RPR  No results found for: HIV  No results found for: [facility]  Lab Results   Component Value Date    RBC 3.98 06/09/2015    WBC 6.2 06/09/2015     Lab Results   Component Value Date    CREATININE 1.01 04/15/2015    GLUCOSE 94 04/15/2015     Thank you for allowing us to participate in the care of this patient. Please call with questions. Davian Vail MD  - Office: [phone]    Please note that this chart was generated using voice recognition Dragon dictation software. Although every effort was made to ensure the accuracy of this automated transcription, some errors in transcription may have occurred. If you have questions, please do not hesitate to call me. I look forward to following Jimbo Moore along with you.     Sincerely,        Davian aVil MD

## 2018-01-29 NOTE — PROGRESS NOTES
2    vitamin D3 (CHOLECALCIFEROL) 5000 UNITS TABS tablet, 0 capsules, Disp: , Rfl:     hydrochlorothiazide (HYDRODIURIL) 25 MG tablet, Take 0.5 tablets by mouth daily, Disp: 45 tablet, Rfl: 3    nystatin (MYCOSTATIN) 936824 UNIT/GM ointment, Apply topically 2 times daily. , Disp: 30 g, Rfl: 2    fluconazole (DIFLUCAN) 100 MG tablet, Take 1 tablet by mouth as needed (TAKES WHIN ON ATB TO AVOID THRUSH), Disp: 10 tablet, Rfl: 0    diltiazem (CARDIZEM) 60 MG tablet, Take 60 mg by mouth 2 times daily , Disp: , Rfl:     loperamide (IMODIUM) 2 MG capsule, Take 2 mg by mouth 4 times daily as needed for Diarrhea (TAKES WITH LOMOTIL), Disp: , Rfl:     Nutritional Supplements (PEPTAMEN AF) LIQD, Take by mouth Indications: TUBE FEED 1500 CALORIES A DAY, Disp: , Rfl:     ondansetron (ZOFRAN) 8 MG tablet,  Take 8 mg by mouth every 8 hours as needed , Disp: , Rfl:     EPINEPHrine 0.3 MG/0.3ML SOAJ injection, Inject 0.3 mg into the muscle., Disp: , Rfl:     Immune Globulin, Human, (HIZENTRA) 10 GM/50ML SOLN,  Inject 10 g into the skin every 7 days THURSDAYS, Disp: , Rfl:     Cholecalciferol (VITAMIN D-3) 5000 UNITS TABS, Take 1 tablet by mouth daily. , Disp: , Rfl:     amitriptyline (ELAVIL) 100 MG tablet, Take 100 mg by mouth nightly., Disp: , Rfl:     Diphenoxylate-Atropine (LOMOTIL PO), Take  by mouth as needed. , Disp: , Rfl:     flecainide (TAMBOCOR) 50 MG tablet, Take 50 mg by mouth daily. , Disp: , Rfl:     dicyclomine (BENTYL) 20 MG tablet, Take 20 mg by mouth every 6 hours as needed. , Disp: , Rfl:       Social History:     Social History     Social History    Marital status:      Spouse name: N/A    Number of children: N/A    Years of education: N/A     Occupational History    Not on file.      Social History Main Topics    Smoking status: Never Smoker    Smokeless tobacco: Never Used    Alcohol use No    Drug use: No    Sexual activity: Not on file     Other Topics Concern    Not on file accuracy of this automated transcription, some errors in transcription may have occurred.

## 2018-02-06 ENCOUNTER — HOSPITAL ENCOUNTER (OUTPATIENT)
Dept: ULTRASOUND IMAGING | Age: 68
Discharge: HOME | End: 2018-02-08
Payer: MEDICARE

## 2018-02-06 ENCOUNTER — HOSPITAL ENCOUNTER (OUTPATIENT)
Dept: MAMMOGRAPHY | Age: 68
Discharge: HOME | End: 2018-02-08
Payer: MEDICARE

## 2018-02-06 DIAGNOSIS — R59.9 ENLARGED LYMPH NODE: ICD-10-CM

## 2018-02-06 DIAGNOSIS — N60.12 MASTITIS CHRONIC, LEFT: ICD-10-CM

## 2018-02-06 DIAGNOSIS — R92.8 ABNORMAL MAMMOGRAM: ICD-10-CM

## 2018-02-06 DIAGNOSIS — R92.8 OTHER ABNORMAL AND INCONCLUSIVE FINDINGS ON DIAGNOSTIC IMAGING OF BREAST: Primary | ICD-10-CM

## 2018-02-06 PROCEDURE — 76642 ULTRASOUND BREAST LIMITED: CPT

## 2018-02-06 PROCEDURE — G0279 TOMOSYNTHESIS, MAMMO: HCPCS

## 2018-02-06 PROCEDURE — 77065 DX MAMMO INCL CAD UNI: CPT

## 2018-02-12 ASSESSMENT — ENCOUNTER SYMPTOMS: EYES NEGATIVE: 1

## 2018-02-14 ENCOUNTER — HOSPITAL ENCOUNTER (OUTPATIENT)
Dept: MAMMOGRAPHY | Age: 68
Discharge: HOME | End: 2018-02-16
Payer: MEDICARE

## 2018-02-14 ENCOUNTER — HOSPITAL ENCOUNTER (OUTPATIENT)
Dept: ULTRASOUND IMAGING | Age: 68
Discharge: HOME | End: 2018-02-16
Payer: MEDICARE

## 2018-02-14 DIAGNOSIS — Z98.890 STATUS POST BREAST BIOPSY: ICD-10-CM

## 2018-02-14 PROCEDURE — 19083 BX BREAST 1ST LESION US IMAG: CPT

## 2018-02-14 PROCEDURE — 77065 DX MAMMO INCL CAD UNI: CPT

## 2018-02-14 PROCEDURE — 2500000003 HC RX 250 WO HCPCS

## 2018-02-14 PROCEDURE — 88305 TISSUE EXAM BY PATHOLOGIST: CPT

## 2018-02-16 ENCOUNTER — TELEPHONE (OUTPATIENT)
Dept: PRIMARY CARE CLINIC | Age: 68
End: 2018-02-16

## 2018-02-16 LAB — SURGICAL PATHOLOGY REPORT: NORMAL

## 2018-03-07 ENCOUNTER — HOSPITAL ENCOUNTER (OUTPATIENT)
Age: 68
Setting detail: SPECIMEN
Discharge: HOME | End: 2018-03-07
Payer: MEDICARE

## 2018-03-07 ENCOUNTER — OFFICE VISIT (OUTPATIENT)
Dept: INFECTIOUS DISEASES | Age: 68
End: 2018-03-07
Payer: MEDICARE

## 2018-03-07 VITALS
WEIGHT: 177.8 LBS | SYSTOLIC BLOOD PRESSURE: 141 MMHG | HEART RATE: 75 BPM | HEIGHT: 70 IN | DIASTOLIC BLOOD PRESSURE: 84 MMHG | OXYGEN SATURATION: 100 % | TEMPERATURE: 97.1 F | RESPIRATION RATE: 14 BRPM | BODY MASS INDEX: 25.45 KG/M2

## 2018-03-07 DIAGNOSIS — L03.311 CELLULITIS, ABDOMINAL WALL: ICD-10-CM

## 2018-03-07 DIAGNOSIS — L02.211 ABDOMINAL WALL ABSCESS: Primary | ICD-10-CM

## 2018-03-07 PROCEDURE — 1036F TOBACCO NON-USER: CPT | Performed by: INTERNAL MEDICINE

## 2018-03-07 PROCEDURE — G8419 CALC BMI OUT NRM PARAM NOF/U: HCPCS | Performed by: INTERNAL MEDICINE

## 2018-03-07 PROCEDURE — G8427 DOCREV CUR MEDS BY ELIG CLIN: HCPCS | Performed by: INTERNAL MEDICINE

## 2018-03-07 PROCEDURE — 4040F PNEUMOC VAC/ADMIN/RCVD: CPT | Performed by: INTERNAL MEDICINE

## 2018-03-07 PROCEDURE — 3017F COLORECTAL CA SCREEN DOC REV: CPT | Performed by: INTERNAL MEDICINE

## 2018-03-07 PROCEDURE — 3014F SCREEN MAMMO DOC REV: CPT | Performed by: INTERNAL MEDICINE

## 2018-03-07 PROCEDURE — G8484 FLU IMMUNIZE NO ADMIN: HCPCS | Performed by: INTERNAL MEDICINE

## 2018-03-07 PROCEDURE — 1123F ACP DISCUSS/DSCN MKR DOCD: CPT | Performed by: INTERNAL MEDICINE

## 2018-03-07 PROCEDURE — 1090F PRES/ABSN URINE INCON ASSESS: CPT | Performed by: INTERNAL MEDICINE

## 2018-03-07 PROCEDURE — 99214 OFFICE O/P EST MOD 30 MIN: CPT | Performed by: INTERNAL MEDICINE

## 2018-03-07 PROCEDURE — G8399 PT W/DXA RESULTS DOCUMENT: HCPCS | Performed by: INTERNAL MEDICINE

## 2018-03-07 RX ORDER — FLUCONAZOLE 100 MG/1
200 TABLET ORAL DAILY
Qty: 20 TABLET | Refills: 0 | Status: SHIPPED | OUTPATIENT
Start: 2018-03-07 | End: 2018-03-17

## 2018-03-07 RX ORDER — CEPHALEXIN 500 MG/1
500 CAPSULE ORAL 4 TIMES DAILY
Qty: 40 CAPSULE | Refills: 0 | Status: SHIPPED | OUTPATIENT
Start: 2018-03-07 | End: 2018-03-17

## 2018-03-07 RX ORDER — DOXYCYCLINE HYCLATE 100 MG
100 TABLET ORAL 2 TIMES DAILY
Qty: 20 TABLET | Refills: 0 | Status: SHIPPED | OUTPATIENT
Start: 2018-03-07 | End: 2018-03-11 | Stop reason: SDUPTHER

## 2018-03-07 RX ORDER — CLOTRIMAZOLE AND BETAMETHASONE DIPROPIONATE 10; .64 MG/G; MG/G
1 CREAM TOPICAL PRN
Refills: 0 | COMMUNITY
Start: 2018-02-19 | End: 2018-04-26 | Stop reason: ALTCHOICE

## 2018-03-07 ASSESSMENT — ENCOUNTER SYMPTOMS
ALLERGIC/IMMUNOLOGIC NEGATIVE: 1
EYES NEGATIVE: 1

## 2018-03-07 NOTE — PROGRESS NOTES
many years. Has had no issues until very recently when she developed twice small abscesses around the exit of the acute. Drainage of that abscess was done in the office of her doctor. No culture taken. The area and around the PEG remains irritated and red. This has been any development for a while now. She comes in for an opinion today. With a seem redness around the pack. Picture taken as below. No fever associated and no rash other than in that area. No cough or phlegm    Visit of 1/29/18  As recommended last time. She stopped her Hibiclens on the CHRISTOFER in the peg site started cleaning it with soap and water, as well as Lotrisone to the area of the PEG. There is a persistent leak according around the PEG area. The redness and the induration has completely resolved. I notice now that the J-tube site has a little bit of redness  No fever, no chills, and she is much happier with the results. Was asking me if she needs to go back on the IVIG as she did in the past for Concern that the abdominal wall infections might be a sign of low immunity  I doubt at this time that those infections or signing any immunity deficiency, rather I think are related to the ongoing leak around both tubes. She also is telling me that she's noticed last week A Left breast streak redness from the left axillae, and spontaneously resolved. I'm noticing on my exam that she has on the left axillary area. A palpable lymph nodes. It seems that, 6 mo ago left axillae had a LN palpable, still to present today on my exam  Last mammogram neg May 2017. I think she is to repeat her mammogram earlier, this was discussed with the patient who is agreeable    Visit of 3/7/18, her mammogram and ultrasound were normal, but she developed an abscess and around the PEG. It has been getting worse and itching her with pain. No fever associated. Some redness around it.   I was able to aspirate some fluid with a needle in the office one cc, Prescriptions:     clotrimazole-betamethasone (LOTRISONE) 1-0.05 % cream, Apply 1 Dose topically as needed, Disp: , Rfl: 0    cephALEXin (KEFLEX) 500 MG capsule, Take 1 capsule by mouth 4 times daily for 10 days, Disp: 40 capsule, Rfl: 0    doxycycline hyclate (VIBRA-TABS) 100 MG tablet, Take 1 tablet by mouth 2 times daily for 10 days, Disp: 20 tablet, Rfl: 0    Elastic Bandages & Supports (MEDICAL COMPRESSION STOCKINGS) MISC, Knee high 20 -30 mm hg, Disp: 1 each, Rfl: 2    HYDROcodone-acetaminophen (NORCO) 5-325 MG per tablet, Take 1 tablet by mouth every 6 hours as needed for Pain ., Disp: 30 tablet, Rfl: 0    ibuprofen (ADVIL;MOTRIN) 600 MG tablet, Take 1 tablet by mouth every 8 hours as needed for Pain, Disp: 270 tablet, Rfl: 3    B-D 3CC LUER-JUSTIN SYR 21GX1\" 21G X 1\" 3 ML MISC, use as directed WITH VIT B EVERY MONTH, Disp: 100 each, Rfl: 3    ciprofloxacin (CIPRO) 500 MG tablet, , Disp: , Rfl:     sertraline (ZOLOFT) 100 MG tablet, Take 1 tablet by mouth  daily, Disp: 90 tablet, Rfl: 3    lactobacillus (BACID) TABS, take 1 tablet by mouth three times a day, Disp: 180 tablet, Rfl: 3    cyanocobalamin 1000 MCG/ML injection, INJECT 1ML INTO THE MUSCLE EVERY 30 DAYS ON THE FIRST OF THE MONTH, Disp: 3 vial, Rfl: 2    vitamin D3 (CHOLECALCIFEROL) 5000 UNITS TABS tablet, 0 capsules, Disp: , Rfl:     hydrochlorothiazide (HYDRODIURIL) 25 MG tablet, Take 0.5 tablets by mouth daily, Disp: 45 tablet, Rfl: 3    nystatin (MYCOSTATIN) 544474 UNIT/GM ointment, Apply topically 2 times daily. , Disp: 30 g, Rfl: 2    fluconazole (DIFLUCAN) 100 MG tablet, Take 1 tablet by mouth as needed (TAKES WHIN ON ATB TO AVOID THRUSH), Disp: 10 tablet, Rfl: 0    diltiazem (CARDIZEM) 60 MG tablet, Take 60 mg by mouth 2 times daily , Disp: , Rfl:     loperamide (IMODIUM) 2 MG capsule, Take 2 mg by mouth 4 times daily as needed for Diarrhea (TAKES WITH LOMOTIL), Disp: , Rfl:     Nutritional Supplements (PEPTAMEN AF) LIQD, Take 06/09/2015    HCT 32.8 04/15/2015     06/09/2015     04/15/2015    LYMPHOPCT 32 06/09/2015    LYMPHOPCT 30 04/15/2015    MONOPCT 7 06/09/2015    MONOPCT 8 04/15/2015     BMP:   Lab Results   Component Value Date     04/15/2015     04/14/2015    K 4.4 04/15/2015    K 4.3 04/14/2015     04/15/2015    CL 99 04/14/2015    CO2 25 04/15/2015    CO2 25 04/14/2015    BUN 9 04/15/2015    BUN 10 04/14/2015    CREATININE 1.01 04/15/2015    CREATININE 1.04 04/14/2015     Hepatic Function Panel:   Lab Results   Component Value Date    PROT 6.9 11/16/2015    LABALBU 3.9 11/16/2015    BILIDIR <0.08 11/16/2015    IBILI CANNOT BE CALCULATED 11/16/2015    BILITOT 0.23 11/16/2015    ALKPHOS 109 11/16/2015    ALT 14 11/16/2015    AST 17 11/16/2015     No results found for: RPR  No results found for: HIV  No results found for: [facility]  Lab Results   Component Value Date    RBC 3.98 06/09/2015    WBC 6.2 06/09/2015     Lab Results   Component Value Date    CREATININE 1.01 04/15/2015    GLUCOSE 94 04/15/2015     Thank you for allowing us to participate in the care of this patient. Please call with questions. Yajaira Ceja MD  - Office: [phone]    Please note that this chart was generated using voice recognition Dragon dictation software. Although every effort was made to ensure the accuracy of this automated transcription, some errors in transcription may have occurred.

## 2018-03-08 ENCOUNTER — HOSPITAL ENCOUNTER (OUTPATIENT)
Age: 68
Setting detail: OUTPATIENT SURGERY
Discharge: HOME | End: 2018-03-08
Attending: SURGERY | Admitting: SURGERY
Payer: MEDICARE

## 2018-03-08 VITALS
RESPIRATION RATE: 16 BRPM | HEART RATE: 83 BPM | BODY MASS INDEX: 25.34 KG/M2 | OXYGEN SATURATION: 98 % | DIASTOLIC BLOOD PRESSURE: 78 MMHG | HEIGHT: 70 IN | SYSTOLIC BLOOD PRESSURE: 159 MMHG | WEIGHT: 177 LBS | TEMPERATURE: 97.7 F

## 2018-03-08 DIAGNOSIS — L02.91 SOFT TISSUE ABSCESS: Primary | ICD-10-CM

## 2018-03-08 PROCEDURE — 7100000010 HC PHASE II RECOVERY - FIRST 15 MIN: Performed by: SURGERY

## 2018-03-08 PROCEDURE — 3600000014 HC SURGERY LEVEL 4 ADDTL 15MIN: Performed by: SURGERY

## 2018-03-08 PROCEDURE — 3600000004 HC SURGERY LEVEL 4 BASE: Performed by: SURGERY

## 2018-03-08 PROCEDURE — 7100000011 HC PHASE II RECOVERY - ADDTL 15 MIN: Performed by: SURGERY

## 2018-03-08 PROCEDURE — 2500000003 HC RX 250 WO HCPCS: Performed by: SURGERY

## 2018-03-08 RX ORDER — BUPIVACAINE HYDROCHLORIDE AND EPINEPHRINE 2.5; 5 MG/ML; UG/ML
INJECTION, SOLUTION EPIDURAL; INFILTRATION; INTRACAUDAL; PERINEURAL PRN
Status: DISCONTINUED | OUTPATIENT
Start: 2018-03-08 | End: 2018-03-08 | Stop reason: HOSPADM

## 2018-03-08 RX ORDER — LIDOCAINE HYDROCHLORIDE 10 MG/ML
INJECTION, SOLUTION EPIDURAL; INFILTRATION; INTRACAUDAL; PERINEURAL PRN
Status: DISCONTINUED | OUTPATIENT
Start: 2018-03-08 | End: 2018-03-08 | Stop reason: HOSPADM

## 2018-03-08 RX ORDER — DOCUSATE SODIUM 100 MG/1
100 CAPSULE, LIQUID FILLED ORAL 2 TIMES DAILY PRN
Qty: 30 CAPSULE | Refills: 0 | Status: SHIPPED | OUTPATIENT
Start: 2018-03-08 | End: 2018-04-26 | Stop reason: ALTCHOICE

## 2018-03-08 RX ORDER — HYDROCODONE BITARTRATE AND ACETAMINOPHEN 5; 325 MG/1; MG/1
TABLET ORAL
Qty: 20 TABLET | Refills: 0 | Status: SHIPPED | OUTPATIENT
Start: 2018-03-08 | End: 2018-03-15

## 2018-03-08 ASSESSMENT — PAIN - FUNCTIONAL ASSESSMENT: PAIN_FUNCTIONAL_ASSESSMENT: 0-10

## 2018-03-08 ASSESSMENT — PAIN SCALES - GENERAL
PAINLEVEL_OUTOF10: 0
PAINLEVEL_OUTOF10: 0

## 2018-03-09 ENCOUNTER — TELEPHONE (OUTPATIENT)
Dept: INFECTIOUS DISEASES | Age: 68
End: 2018-03-09

## 2018-03-09 RX ORDER — CEPHALEXIN 500 MG/1
500 CAPSULE ORAL 4 TIMES DAILY
Qty: 40 CAPSULE | Refills: 0 | Status: SHIPPED | OUTPATIENT
Start: 2018-03-09 | End: 2018-03-11 | Stop reason: SDUPTHER

## 2018-03-11 DIAGNOSIS — L02.211 ABDOMINAL WALL ABSCESS: Primary | ICD-10-CM

## 2018-03-11 RX ORDER — LEVOFLOXACIN 500 MG/1
500 TABLET, FILM COATED ORAL DAILY
Qty: 7 TABLET | Refills: 0 | Status: SHIPPED | OUTPATIENT
Start: 2018-03-11 | End: 2018-03-12 | Stop reason: CLARIF

## 2018-03-12 LAB
CULTURE: ABNORMAL
DIRECT EXAM: ABNORMAL
DIRECT EXAM: ABNORMAL
Lab: ABNORMAL
ORGANISM: ABNORMAL
SPECIMEN DESCRIPTION: ABNORMAL
STATUS: ABNORMAL

## 2018-03-12 RX ORDER — LEVOFLOXACIN 500 MG/1
500 TABLET, FILM COATED ORAL DAILY
Qty: 7 TABLET | Refills: 0 | Status: SHIPPED | OUTPATIENT
Start: 2018-03-12 | End: 2018-03-19

## 2018-04-10 ENCOUNTER — INITIAL CONSULT (OUTPATIENT)
Dept: BARIATRICS/WEIGHT MGMT | Age: 68
End: 2018-04-10
Payer: MEDICARE

## 2018-04-10 VITALS
BODY MASS INDEX: 24.77 KG/M2 | SYSTOLIC BLOOD PRESSURE: 128 MMHG | HEART RATE: 72 BPM | RESPIRATION RATE: 20 BRPM | HEIGHT: 70 IN | WEIGHT: 173 LBS | DIASTOLIC BLOOD PRESSURE: 70 MMHG

## 2018-04-10 DIAGNOSIS — K31.84 GASTROPARESIS: Primary | ICD-10-CM

## 2018-04-10 DIAGNOSIS — Z93.1 GASTROINTESTINAL TUBE PRESENT (HCC): ICD-10-CM

## 2018-04-10 DIAGNOSIS — Z93.4 JEJUNOSTOMY TUBE PRESENT (HCC): ICD-10-CM

## 2018-04-10 PROCEDURE — 1090F PRES/ABSN URINE INCON ASSESS: CPT | Performed by: SURGERY

## 2018-04-10 PROCEDURE — G8399 PT W/DXA RESULTS DOCUMENT: HCPCS | Performed by: SURGERY

## 2018-04-10 PROCEDURE — 3014F SCREEN MAMMO DOC REV: CPT | Performed by: SURGERY

## 2018-04-10 PROCEDURE — 99204 OFFICE O/P NEW MOD 45 MIN: CPT | Performed by: SURGERY

## 2018-04-10 PROCEDURE — G8427 DOCREV CUR MEDS BY ELIG CLIN: HCPCS | Performed by: SURGERY

## 2018-04-10 PROCEDURE — 3017F COLORECTAL CA SCREEN DOC REV: CPT | Performed by: SURGERY

## 2018-04-10 PROCEDURE — 1123F ACP DISCUSS/DSCN MKR DOCD: CPT | Performed by: SURGERY

## 2018-04-10 PROCEDURE — 4040F PNEUMOC VAC/ADMIN/RCVD: CPT | Performed by: SURGERY

## 2018-04-10 PROCEDURE — 1036F TOBACCO NON-USER: CPT | Performed by: SURGERY

## 2018-04-10 PROCEDURE — G8420 CALC BMI NORM PARAMETERS: HCPCS | Performed by: SURGERY

## 2018-04-17 ENCOUNTER — HOSPITAL ENCOUNTER (OUTPATIENT)
Dept: GENERAL RADIOLOGY | Age: 68
Discharge: HOME | End: 2018-04-19
Payer: MEDICARE

## 2018-04-17 DIAGNOSIS — K31.84 GASTROPARESIS: ICD-10-CM

## 2018-04-17 PROCEDURE — 74240 X-RAY XM UPR GI TRC 1CNTRST: CPT

## 2018-04-26 ENCOUNTER — OFFICE VISIT (OUTPATIENT)
Dept: PRIMARY CARE CLINIC | Age: 68
End: 2018-04-26
Payer: MEDICARE

## 2018-04-26 VITALS
HEART RATE: 56 BPM | DIASTOLIC BLOOD PRESSURE: 82 MMHG | RESPIRATION RATE: 19 BRPM | SYSTOLIC BLOOD PRESSURE: 146 MMHG | BODY MASS INDEX: 24.65 KG/M2 | WEIGHT: 172.2 LBS | HEIGHT: 70 IN

## 2018-04-26 DIAGNOSIS — Z93.4 JEJUNOSTOMY TUBE PRESENT (HCC): ICD-10-CM

## 2018-04-26 DIAGNOSIS — I10 ESSENTIAL HYPERTENSION: Primary | ICD-10-CM

## 2018-04-26 DIAGNOSIS — Z93.1 GASTROINTESTINAL TUBE PRESENT (HCC): ICD-10-CM

## 2018-04-26 DIAGNOSIS — F51.01 PRIMARY INSOMNIA: ICD-10-CM

## 2018-04-26 DIAGNOSIS — F41.9 ANXIETY: ICD-10-CM

## 2018-04-26 DIAGNOSIS — F32.4 MAJOR DEPRESSIVE DISORDER WITH SINGLE EPISODE, IN PARTIAL REMISSION (HCC): ICD-10-CM

## 2018-04-26 PROCEDURE — G8399 PT W/DXA RESULTS DOCUMENT: HCPCS | Performed by: NURSE PRACTITIONER

## 2018-04-26 PROCEDURE — 1090F PRES/ABSN URINE INCON ASSESS: CPT | Performed by: NURSE PRACTITIONER

## 2018-04-26 PROCEDURE — 3017F COLORECTAL CA SCREEN DOC REV: CPT | Performed by: NURSE PRACTITIONER

## 2018-04-26 PROCEDURE — 1036F TOBACCO NON-USER: CPT | Performed by: NURSE PRACTITIONER

## 2018-04-26 PROCEDURE — 4040F PNEUMOC VAC/ADMIN/RCVD: CPT | Performed by: NURSE PRACTITIONER

## 2018-04-26 PROCEDURE — 1123F ACP DISCUSS/DSCN MKR DOCD: CPT | Performed by: NURSE PRACTITIONER

## 2018-04-26 PROCEDURE — 99214 OFFICE O/P EST MOD 30 MIN: CPT | Performed by: NURSE PRACTITIONER

## 2018-04-26 PROCEDURE — G8420 CALC BMI NORM PARAMETERS: HCPCS | Performed by: NURSE PRACTITIONER

## 2018-04-26 PROCEDURE — G8427 DOCREV CUR MEDS BY ELIG CLIN: HCPCS | Performed by: NURSE PRACTITIONER

## 2018-04-26 RX ORDER — LORAZEPAM 1 MG/1
1 TABLET ORAL NIGHTLY
Qty: 30 TABLET | Refills: 0 | Status: SHIPPED | OUTPATIENT
Start: 2018-04-26 | End: 2018-08-30 | Stop reason: SDUPTHER

## 2018-04-26 RX ORDER — SERTRALINE HYDROCHLORIDE 100 MG/1
150 TABLET, FILM COATED ORAL DAILY
Qty: 135 TABLET | Refills: 3 | Status: SHIPPED | OUTPATIENT
Start: 2018-04-26 | End: 2018-05-17 | Stop reason: SDUPTHER

## 2018-04-26 ASSESSMENT — ENCOUNTER SYMPTOMS
CONSTIPATION: 0
SINUS PRESSURE: 0
COUGH: 0
TROUBLE SWALLOWING: 0
ABDOMINAL PAIN: 0
NAUSEA: 0
DIARRHEA: 0
VOMITING: 0
SHORTNESS OF BREATH: 0
SORE THROAT: 0
BLOOD IN STOOL: 0
WHEEZING: 0

## 2018-04-27 ENCOUNTER — OFFICE VISIT (OUTPATIENT)
Dept: BARIATRICS/WEIGHT MGMT | Age: 68
End: 2018-04-27
Payer: MEDICARE

## 2018-04-27 VITALS
HEART RATE: 80 BPM | SYSTOLIC BLOOD PRESSURE: 124 MMHG | BODY MASS INDEX: 24.91 KG/M2 | WEIGHT: 174 LBS | DIASTOLIC BLOOD PRESSURE: 78 MMHG | HEIGHT: 70 IN

## 2018-04-27 DIAGNOSIS — Z93.1 GASTROINTESTINAL TUBE PRESENT (HCC): Primary | ICD-10-CM

## 2018-04-27 DIAGNOSIS — Z93.4 JEJUNOSTOMY TUBE PRESENT (HCC): ICD-10-CM

## 2018-04-27 DIAGNOSIS — K31.84 GASTROPARESIS: ICD-10-CM

## 2018-04-27 PROCEDURE — 1123F ACP DISCUSS/DSCN MKR DOCD: CPT | Performed by: SURGERY

## 2018-04-27 PROCEDURE — 1036F TOBACCO NON-USER: CPT | Performed by: SURGERY

## 2018-04-27 PROCEDURE — G8420 CALC BMI NORM PARAMETERS: HCPCS | Performed by: SURGERY

## 2018-04-27 PROCEDURE — 3017F COLORECTAL CA SCREEN DOC REV: CPT | Performed by: SURGERY

## 2018-04-27 PROCEDURE — G8427 DOCREV CUR MEDS BY ELIG CLIN: HCPCS | Performed by: SURGERY

## 2018-04-27 PROCEDURE — G8399 PT W/DXA RESULTS DOCUMENT: HCPCS | Performed by: SURGERY

## 2018-04-27 PROCEDURE — 1090F PRES/ABSN URINE INCON ASSESS: CPT | Performed by: SURGERY

## 2018-04-27 PROCEDURE — 4040F PNEUMOC VAC/ADMIN/RCVD: CPT | Performed by: SURGERY

## 2018-04-27 PROCEDURE — 99213 OFFICE O/P EST LOW 20 MIN: CPT | Performed by: SURGERY

## 2018-05-17 ENCOUNTER — TELEPHONE (OUTPATIENT)
Dept: PRIMARY CARE CLINIC | Age: 68
End: 2018-05-17

## 2018-05-17 DIAGNOSIS — F32.4 MAJOR DEPRESSIVE DISORDER WITH SINGLE EPISODE, IN PARTIAL REMISSION (HCC): ICD-10-CM

## 2018-05-17 DIAGNOSIS — F41.9 ANXIETY: ICD-10-CM

## 2018-05-17 RX ORDER — SERTRALINE HYDROCHLORIDE 100 MG/1
150 TABLET, FILM COATED ORAL DAILY
Qty: 135 TABLET | Refills: 3 | Status: SHIPPED | OUTPATIENT
Start: 2018-05-17 | End: 2019-02-28 | Stop reason: SDUPTHER

## 2018-05-22 DIAGNOSIS — K90.9 INTESTINAL MALABSORPTION: ICD-10-CM

## 2018-05-22 DIAGNOSIS — K31.84 GASTROPARESIS: ICD-10-CM

## 2018-05-22 RX ORDER — CYANOCOBALAMIN 1000 UG/ML
INJECTION INTRAMUSCULAR; SUBCUTANEOUS
Qty: 3 ML | Refills: 2 | Status: SHIPPED | OUTPATIENT
Start: 2018-05-22 | End: 2019-06-04 | Stop reason: SDUPTHER

## 2018-06-05 DIAGNOSIS — L03.311 ABDOMINAL WALL CELLULITIS: ICD-10-CM

## 2018-06-07 RX ORDER — CLOTRIMAZOLE AND BETAMETHASONE DIPROPIONATE 10; .64 MG/G; MG/G
CREAM TOPICAL
Qty: 1 TUBE | Refills: 0 | Status: SHIPPED | OUTPATIENT
Start: 2018-06-07 | End: 2019-01-04 | Stop reason: SDUPTHER

## 2018-06-19 ENCOUNTER — OFFICE VISIT (OUTPATIENT)
Dept: PRIMARY CARE CLINIC | Age: 68
End: 2018-06-19
Payer: MEDICARE

## 2018-06-19 VITALS
HEIGHT: 70 IN | RESPIRATION RATE: 18 BRPM | DIASTOLIC BLOOD PRESSURE: 76 MMHG | SYSTOLIC BLOOD PRESSURE: 122 MMHG | HEART RATE: 76 BPM | WEIGHT: 172.2 LBS | BODY MASS INDEX: 24.65 KG/M2

## 2018-06-19 DIAGNOSIS — F32.4 MAJOR DEPRESSIVE DISORDER WITH SINGLE EPISODE, IN PARTIAL REMISSION (HCC): Primary | ICD-10-CM

## 2018-06-19 DIAGNOSIS — F41.9 ANXIETY: ICD-10-CM

## 2018-06-19 DIAGNOSIS — F51.01 PRIMARY INSOMNIA: ICD-10-CM

## 2018-06-19 DIAGNOSIS — K31.84 GASTROPARESIS: ICD-10-CM

## 2018-06-19 DIAGNOSIS — Z93.1 GASTROINTESTINAL TUBE PRESENT (HCC): ICD-10-CM

## 2018-06-19 DIAGNOSIS — Z93.4 JEJUNOSTOMY TUBE PRESENT (HCC): ICD-10-CM

## 2018-06-19 PROCEDURE — 1090F PRES/ABSN URINE INCON ASSESS: CPT | Performed by: NURSE PRACTITIONER

## 2018-06-19 PROCEDURE — 4040F PNEUMOC VAC/ADMIN/RCVD: CPT | Performed by: NURSE PRACTITIONER

## 2018-06-19 PROCEDURE — G8399 PT W/DXA RESULTS DOCUMENT: HCPCS | Performed by: NURSE PRACTITIONER

## 2018-06-19 PROCEDURE — 3017F COLORECTAL CA SCREEN DOC REV: CPT | Performed by: NURSE PRACTITIONER

## 2018-06-19 PROCEDURE — 99214 OFFICE O/P EST MOD 30 MIN: CPT | Performed by: NURSE PRACTITIONER

## 2018-06-19 PROCEDURE — G8427 DOCREV CUR MEDS BY ELIG CLIN: HCPCS | Performed by: NURSE PRACTITIONER

## 2018-06-19 PROCEDURE — 1123F ACP DISCUSS/DSCN MKR DOCD: CPT | Performed by: NURSE PRACTITIONER

## 2018-06-19 PROCEDURE — 1036F TOBACCO NON-USER: CPT | Performed by: NURSE PRACTITIONER

## 2018-06-19 PROCEDURE — G8420 CALC BMI NORM PARAMETERS: HCPCS | Performed by: NURSE PRACTITIONER

## 2018-06-19 ASSESSMENT — ENCOUNTER SYMPTOMS
SORE THROAT: 0
DIARRHEA: 0
SHORTNESS OF BREATH: 0
NAUSEA: 0
TROUBLE SWALLOWING: 0
VOMITING: 0
WHEEZING: 0
BLOOD IN STOOL: 0
CONSTIPATION: 0
SINUS PRESSURE: 0
COUGH: 0
ABDOMINAL PAIN: 0

## 2018-06-22 ENCOUNTER — TELEPHONE (OUTPATIENT)
Dept: BARIATRICS/WEIGHT MGMT | Age: 68
End: 2018-06-22

## 2018-06-28 LAB
BUN BLDV-MCNC: 21 MG/DL
CALCIUM SERPL-MCNC: 8.9 MG/DL
CHLORIDE BLD-SCNC: 108 MMOL/L
CHOLESTEROL, TOTAL: 184 MG/DL
CHOLESTEROL/HDL RATIO: 3.2
CO2: 26 MMOL/L
CREAT SERPL-MCNC: 1.04 MG/DL
GFR CALCULATED: NORMAL
GLUCOSE BLD-MCNC: 92 MG/DL
HDLC SERPL-MCNC: 57 MG/DL (ref 35–70)
LDL CHOLESTEROL CALCULATED: 108 MG/DL (ref 0–160)
POTASSIUM SERPL-SCNC: 4.5 MMOL/L
SODIUM BLD-SCNC: 142 MMOL/L
TRIGL SERPL-MCNC: 96 MG/DL
VLDLC SERPL CALC-MCNC: 19 MG/DL

## 2018-07-08 ENCOUNTER — PATIENT MESSAGE (OUTPATIENT)
Dept: PRIMARY CARE CLINIC | Age: 68
End: 2018-07-08

## 2018-07-09 ENCOUNTER — TELEPHONE (OUTPATIENT)
Dept: PRIMARY CARE CLINIC | Age: 68
End: 2018-07-09

## 2018-08-21 RX ORDER — SYRINGE WITH NEEDLE, 1 ML 25GX5/8"
SYRINGE, EMPTY DISPOSABLE MISCELLANEOUS
Qty: 100 EACH | Refills: 3 | Status: SHIPPED | OUTPATIENT
Start: 2018-08-21 | End: 2019-08-29 | Stop reason: SDUPTHER

## 2018-08-21 NOTE — TELEPHONE ENCOUNTER
Last OV 6/19/18    Health Maintenance   Topic Date Due    Flu vaccine (1) 09/01/2018    Pneumococcal high/highest risk (2 of 2 - PPSV23) 09/25/2018    Potassium monitoring  06/28/2019    Creatinine monitoring  06/28/2019    Breast cancer screen  02/14/2020    Colon cancer screen colonoscopy  01/12/2022    Lipid screen  06/28/2023    DTaP/Tdap/Td vaccine (2 - Td) 04/01/2026    DEXA (modify frequency per FRAX score)  Completed    Hepatitis C screen  Completed             (applicable per patient's age: Cancer Screenings, Depression Screening, Fall Risk Screening, Immunizations)    LDL Calculated (mg/dL)   Date Value   06/28/2018 108     AST (U/L)   Date Value   11/16/2015 17     ALT (U/L)   Date Value   11/16/2015 14     BUN (mg/dL)   Date Value   06/28/2018 21      (goal A1C is < 7)   (goal LDL is <100) need 30-50% reduction from baseline     BP Readings from Last 3 Encounters:   06/19/18 122/76   04/27/18 124/78   04/26/18 (!) 146/82    (goal /80)      All Future Testing planned in [facility]:  Lab Frequency Next Occurrence   [facility] LOC BREAST BIOPSY LEFT Once 02/06/2018       Next Visit Date:  Future Appointments  Date Time Provider Samuel Faulkner   12/17/2018 9:00 AM Argelia Benjamin APRN - CNP [facility]            Patient Active Problem List:     Depression     Anxiety     Irritable bowel syndrome     Gastroparesis     Fibromyalgia     CVID (common variable immunodeficiency) ([address]     Fatigue     Hypogammaglobulinemia ([address]     Renal insufficiency     Osteopenia     History of stress fracture     Jejunostomy tube present ([address]     Gastrointestinal tube present ([address]     H/O sepsis     Essential hypertension     Primary insomnia

## 2018-08-29 ENCOUNTER — PATIENT MESSAGE (OUTPATIENT)
Dept: PRIMARY CARE CLINIC | Age: 68
End: 2018-08-29

## 2018-08-29 DIAGNOSIS — F51.01 PRIMARY INSOMNIA: ICD-10-CM

## 2018-08-29 DIAGNOSIS — F41.9 ANXIETY: ICD-10-CM

## 2018-08-30 RX ORDER — LORAZEPAM 1 MG/1
1 TABLET ORAL NIGHTLY
Qty: 30 TABLET | Refills: 0 | Status: SHIPPED | OUTPATIENT
Start: 2018-08-30 | End: 2018-12-17 | Stop reason: SDUPTHER

## 2018-08-30 RX ORDER — LORAZEPAM 1 MG/1
1 TABLET ORAL NIGHTLY
Qty: 30 TABLET | Refills: 0 | Status: SHIPPED | OUTPATIENT
Start: 2018-08-30 | End: 2018-08-30 | Stop reason: SDUPTHER

## 2018-11-01 ENCOUNTER — HOSPITAL ENCOUNTER (OUTPATIENT)
Dept: CT IMAGING | Age: 68
Discharge: HOME | End: 2018-11-03
Payer: MEDICARE

## 2018-11-01 DIAGNOSIS — K31.84 GASTROPARESIS: ICD-10-CM

## 2018-11-01 LAB
CREAT SERPL-MCNC: 0.97 MG/DL (ref 0.5–0.9)
GFR AFRICAN AMERICAN: >60 ML/MIN
GFR NON-AFRICAN AMERICAN: 57 ML/MIN
GFR SERPL CREATININE-BSD FRML MDRD: ABNORMAL ML/MIN/{1.73_M2}
GFR SERPL CREATININE-BSD FRML MDRD: ABNORMAL ML/MIN/{1.73_M2}

## 2018-11-01 PROCEDURE — 74177 CT ABD & PELVIS W/CONTRAST: CPT

## 2018-11-01 PROCEDURE — 6360000004 HC RX CONTRAST MEDICATION: Performed by: NURSE PRACTITIONER

## 2018-11-01 PROCEDURE — 2580000003 HC RX 258: Performed by: NURSE PRACTITIONER

## 2018-11-01 PROCEDURE — 82565 ASSAY OF CREATININE: CPT

## 2018-11-01 PROCEDURE — 36415 COLL VENOUS BLD VENIPUNCTURE: CPT

## 2018-11-01 RX ORDER — SODIUM CHLORIDE 0.9 % (FLUSH) 0.9 %
10 SYRINGE (ML) INJECTION
Status: COMPLETED | OUTPATIENT
Start: 2018-11-01 | End: 2018-11-01

## 2018-11-01 RX ORDER — 0.9 % SODIUM CHLORIDE 0.9 %
80 INTRAVENOUS SOLUTION INTRAVENOUS ONCE
Status: COMPLETED | OUTPATIENT
Start: 2018-11-01 | End: 2018-11-01

## 2018-11-01 RX ADMIN — IOHEXOL 50 ML: 240 INJECTION, SOLUTION INTRATHECAL; INTRAVASCULAR; INTRAVENOUS; ORAL at 09:12

## 2018-11-01 RX ADMIN — SODIUM CHLORIDE 80 ML: 9 INJECTION, SOLUTION INTRAVENOUS at 09:11

## 2018-11-01 RX ADMIN — IOPAMIDOL 75 ML: 755 INJECTION, SOLUTION INTRAVENOUS at 09:08

## 2018-11-01 RX ADMIN — Medication 10 ML: at 09:12

## 2018-12-10 RX ORDER — IBUPROFEN 600 MG/1
600 TABLET ORAL EVERY 8 HOURS PRN
Qty: 270 TABLET | Refills: 2 | Status: SHIPPED | OUTPATIENT
Start: 2018-12-10 | End: 2020-01-06

## 2018-12-17 ENCOUNTER — OFFICE VISIT (OUTPATIENT)
Dept: PRIMARY CARE CLINIC | Age: 68
End: 2018-12-17
Payer: MEDICARE

## 2018-12-17 VITALS
WEIGHT: 178.8 LBS | RESPIRATION RATE: 18 BRPM | SYSTOLIC BLOOD PRESSURE: 132 MMHG | HEART RATE: 76 BPM | HEIGHT: 70 IN | BODY MASS INDEX: 25.6 KG/M2 | DIASTOLIC BLOOD PRESSURE: 82 MMHG

## 2018-12-17 DIAGNOSIS — D80.1 HYPOGAMMAGLOBULINEMIA (HCC): ICD-10-CM

## 2018-12-17 DIAGNOSIS — R20.0 NUMBNESS OF FINGERS OF BOTH HANDS: ICD-10-CM

## 2018-12-17 DIAGNOSIS — F41.9 ANXIETY: ICD-10-CM

## 2018-12-17 DIAGNOSIS — I10 ESSENTIAL HYPERTENSION: Primary | ICD-10-CM

## 2018-12-17 DIAGNOSIS — K31.84 GASTROPARESIS: ICD-10-CM

## 2018-12-17 DIAGNOSIS — D83.9 CVID (COMMON VARIABLE IMMUNODEFICIENCY) (HCC): ICD-10-CM

## 2018-12-17 DIAGNOSIS — F51.01 PRIMARY INSOMNIA: ICD-10-CM

## 2018-12-17 PROCEDURE — 1036F TOBACCO NON-USER: CPT | Performed by: NURSE PRACTITIONER

## 2018-12-17 PROCEDURE — G8484 FLU IMMUNIZE NO ADMIN: HCPCS | Performed by: NURSE PRACTITIONER

## 2018-12-17 PROCEDURE — G8419 CALC BMI OUT NRM PARAM NOF/U: HCPCS | Performed by: NURSE PRACTITIONER

## 2018-12-17 PROCEDURE — 1101F PT FALLS ASSESS-DOCD LE1/YR: CPT | Performed by: NURSE PRACTITIONER

## 2018-12-17 PROCEDURE — 4040F PNEUMOC VAC/ADMIN/RCVD: CPT | Performed by: NURSE PRACTITIONER

## 2018-12-17 PROCEDURE — 1090F PRES/ABSN URINE INCON ASSESS: CPT | Performed by: NURSE PRACTITIONER

## 2018-12-17 PROCEDURE — G8399 PT W/DXA RESULTS DOCUMENT: HCPCS | Performed by: NURSE PRACTITIONER

## 2018-12-17 PROCEDURE — G8427 DOCREV CUR MEDS BY ELIG CLIN: HCPCS | Performed by: NURSE PRACTITIONER

## 2018-12-17 PROCEDURE — 99214 OFFICE O/P EST MOD 30 MIN: CPT | Performed by: NURSE PRACTITIONER

## 2018-12-17 PROCEDURE — 1123F ACP DISCUSS/DSCN MKR DOCD: CPT | Performed by: NURSE PRACTITIONER

## 2018-12-17 PROCEDURE — 3017F COLORECTAL CA SCREEN DOC REV: CPT | Performed by: NURSE PRACTITIONER

## 2018-12-17 RX ORDER — LORAZEPAM 1 MG/1
1 TABLET ORAL NIGHTLY
Qty: 30 TABLET | Refills: 0 | Status: SHIPPED | OUTPATIENT
Start: 2018-12-17 | End: 2019-03-11 | Stop reason: SDUPTHER

## 2018-12-17 ASSESSMENT — ENCOUNTER SYMPTOMS
SINUS PRESSURE: 0
BLOOD IN STOOL: 0
SHORTNESS OF BREATH: 0
TROUBLE SWALLOWING: 0
COUGH: 0
CONSTIPATION: 0
SORE THROAT: 0
VOMITING: 0
NAUSEA: 1
DIARRHEA: 0
ABDOMINAL PAIN: 0
WHEEZING: 0

## 2018-12-17 NOTE — PROGRESS NOTES
[facility] PRIMARY CARE  [address] [place] [address] [address]  [place] [address]  Dept: [phone]  Dept Fax: [phone]    Shelby Sandoval is a 80 y.o. female who presentstoday for her medical conditions/complaints as noted below. Shelby Sandoval is c/o of  Chief Complaint   Patient presents with    Hypertension     6 month visit-HCC gap visit         HPI:     Here today for follow up  Reports saw surgeon at [facility], currently considering a total gastrectomy for her gastroparesis, her J tube is not functioning appropriately and she has persistent infection at her G-tube site  She is worried about the surgery, trying to decide if she should pursue    Reports has been having trouble with dropping things, doing her crafts   Has numb feeling in her fingertips that is there continuously  Denies any pain in neck or shoulders      Hypertension   This is a chronic problem. The current episode started more than 1 year ago. The problem is controlled. Pertinent negatives include no chest pain, headaches, palpitations, peripheral edema or shortness of breath. Past treatments include calcium channel blockers. The current treatment provides significant improvement. There are no compliance problems. There is no history of CAD/MI or CVA.        No results found for: LABA1C          ( goal A1C is < 7)   No results found for: LABMICR  LDL Calculated (mg/dL)   Date Value   06/28/2018 108   09/20/2017 107       (goal LDL is <100)   AST (U/L)   Date Value   11/16/2015 17     ALT (U/L)   Date Value   11/16/2015 14     BUN (mg/dL)   Date Value   06/28/2018 21     BP Readings from Last 3 Encounters:   12/17/18 132/82   06/19/18 122/76   04/27/18 124/78          ([id number])    Past Medical History:   Diagnosis Date    Abdominal pain     Anxiety     CVID (common variable immunodeficiency) (HCC)     Depression     Diarrhea     Difficulty swallowing     Dizziness     Fibromyalgia     Dispense Refill    LORazepam (ATIVAN) 1 MG tablet Take 1 tablet by mouth nightly for 30 days. . 30 tablet 0    ibuprofen (ADVIL;MOTRIN) 600 MG tablet TAKE 1 TABLET BY MOUTH  EVERY 8 HOURS AS NEEDED FOR PAIN 270 tablet 2    clotrimazole-betamethasone (LOTRISONE) 1-0.05 % cream apply to affected area twice a day 1 Tube 0    cyanocobalamin 1000 MCG/ML injection INJECT 1 ML INTO THE MUSCLE EVERY 30 DAYS ON THE FIRST OF THE MONTH 3 mL 2    sertraline (ZOLOFT) 100 MG tablet Take 1.5 tablets by mouth daily Take 1 tablet by mouth  daily 135 tablet 3    Elastic Bandages & Supports (MEDICAL COMPRESSION STOCKINGS) MISC Knee high 20 -30 mm hg 1 each 2    lactobacillus (BACID) TABS take 1 tablet by mouth three times a day 180 tablet 3    nystatin (MYCOSTATIN) 935276 UNIT/GM ointment Apply topically 2 times daily. 30 g 2    diltiazem (CARDIZEM) 60 MG tablet Take 60 mg by mouth 2 times daily       loperamide (IMODIUM) 2 MG capsule Take 2 mg by mouth 4 times daily as needed for Diarrhea (TAKES WITH LOMOTIL)      Nutritional Supplements (PEPTAMEN AF) LIQD Take by mouth Indications: TUBE FEED 1500 CALORIES A DAY      ondansetron (ZOFRAN) 8 MG tablet   Take 8 mg by mouth every 8 hours as needed       EPINEPHrine 0.3 MG/0.3ML SOAJ injection Inject 0.3 mg into the muscle.  Immune Globulin, Human, (HIZENTRA) 10 GM/50ML SOLN   Inject 10 g into the skin every 7 days THURSDAYS      Cholecalciferol (VITAMIN D-3) 5000 UNITS TABS Take 1 tablet by mouth daily.  Diphenoxylate-Atropine (LOMOTIL PO) Take  by mouth as needed.  flecainide (TAMBOCOR) 50 MG tablet Take 50 mg by mouth daily.  dicyclomine (BENTYL) 20 MG tablet Take 20 mg by mouth every 6 hours as needed.  B-D 3CC LUER-JUSTIN SYR 21GX1\" 21G X 1\" 3 ML MISC USE AS DIRECTED WITH VITAMIN B EVERY MONTH 100 each 3     No current facility-administered medications for this visit.       Allergies   Allergen Reactions    Macrodantin [Nitrofurantoin Macrocrystal] surgery before pursuing any further, suspect carpal tunnel syndrome     Patient given educational materials - see patient instructions. Discussed use, benefit, and side effects of prescribed medications. All patientquestions answered. Pt voiced understanding. Reviewed health maintenance. Instructedto continue current medications, diet and exercise. Patient agreed with treatmentplan. Follow up as directed.      Electronicallysigned by TRAY Najera CNP on 12/17/2018 at 1:03 PM

## 2019-01-04 DIAGNOSIS — L03.311 ABDOMINAL WALL CELLULITIS: ICD-10-CM

## 2019-01-07 RX ORDER — CLOTRIMAZOLE AND BETAMETHASONE DIPROPIONATE 10; .64 MG/G; MG/G
CREAM TOPICAL
Qty: 1 TUBE | Refills: 0 | Status: SHIPPED | OUTPATIENT
Start: 2019-01-07 | End: 2021-09-16

## 2019-02-26 ENCOUNTER — TELEPHONE (OUTPATIENT)
Dept: PRIMARY CARE CLINIC | Age: 69
End: 2019-02-26

## 2019-02-26 DIAGNOSIS — Z12.39 SCREENING FOR BREAST CANCER: Primary | ICD-10-CM

## 2019-02-28 ENCOUNTER — HOSPITAL ENCOUNTER (OUTPATIENT)
Dept: MAMMOGRAPHY | Age: 69
Discharge: HOME | End: 2019-03-02
Payer: MEDICARE

## 2019-02-28 DIAGNOSIS — Z12.39 SCREENING FOR BREAST CANCER: ICD-10-CM

## 2019-02-28 PROCEDURE — 77067 SCR MAMMO BI INCL CAD: CPT

## 2019-03-11 DIAGNOSIS — F41.9 ANXIETY: ICD-10-CM

## 2019-03-11 DIAGNOSIS — F51.01 PRIMARY INSOMNIA: ICD-10-CM

## 2019-03-11 RX ORDER — LORAZEPAM 1 MG/1
TABLET ORAL
Qty: 30 TABLET | Refills: 0 | Status: SHIPPED | OUTPATIENT
Start: 2019-03-11 | End: 2019-06-17 | Stop reason: SDUPTHER

## 2019-04-05 PROBLEM — R68.2 DRY MOUTH: Status: ACTIVE | Noted: 2018-09-19

## 2019-04-05 PROBLEM — R63.30 FEEDING DIFFICULTIES: Status: ACTIVE | Noted: 2018-11-13

## 2019-04-05 PROBLEM — T50.905A ADVERSE DRUG EFFECT: Status: ACTIVE | Noted: 2018-09-19

## 2019-04-05 PROBLEM — D83.9 CVID (COMMON VARIABLE IMMUNODEFICIENCY) (HCC): Status: ACTIVE | Noted: 2018-10-25

## 2019-05-10 ENCOUNTER — OFFICE VISIT (OUTPATIENT)
Dept: BARIATRICS/WEIGHT MGMT | Age: 69
End: 2019-05-10
Payer: MEDICARE

## 2019-05-10 VITALS
HEIGHT: 70 IN | WEIGHT: 181 LBS | SYSTOLIC BLOOD PRESSURE: 142 MMHG | BODY MASS INDEX: 25.91 KG/M2 | HEART RATE: 76 BPM | DIASTOLIC BLOOD PRESSURE: 90 MMHG

## 2019-05-10 DIAGNOSIS — N28.9 RENAL INSUFFICIENCY: ICD-10-CM

## 2019-05-10 DIAGNOSIS — I10 ESSENTIAL HYPERTENSION: ICD-10-CM

## 2019-05-10 DIAGNOSIS — D80.1 HYPOGAMMAGLOBULINEMIA (HCC): Primary | ICD-10-CM

## 2019-05-10 DIAGNOSIS — R53.82 CHRONIC FATIGUE: ICD-10-CM

## 2019-05-10 PROCEDURE — G8427 DOCREV CUR MEDS BY ELIG CLIN: HCPCS | Performed by: SURGERY

## 2019-05-10 PROCEDURE — G8399 PT W/DXA RESULTS DOCUMENT: HCPCS | Performed by: SURGERY

## 2019-05-10 PROCEDURE — 3017F COLORECTAL CA SCREEN DOC REV: CPT | Performed by: SURGERY

## 2019-05-10 PROCEDURE — 1123F ACP DISCUSS/DSCN MKR DOCD: CPT | Performed by: SURGERY

## 2019-05-10 PROCEDURE — 99213 OFFICE O/P EST LOW 20 MIN: CPT | Performed by: SURGERY

## 2019-05-10 PROCEDURE — 4040F PNEUMOC VAC/ADMIN/RCVD: CPT | Performed by: SURGERY

## 2019-05-10 PROCEDURE — 1090F PRES/ABSN URINE INCON ASSESS: CPT | Performed by: SURGERY

## 2019-05-10 PROCEDURE — G8419 CALC BMI OUT NRM PARAM NOF/U: HCPCS | Performed by: SURGERY

## 2019-05-10 PROCEDURE — 1036F TOBACCO NON-USER: CPT | Performed by: SURGERY

## 2019-05-10 RX ORDER — CHOLESTYRAMINE 4 G/9G
1 POWDER, FOR SUSPENSION ORAL 2 TIMES DAILY
Qty: 90 PACKET | Refills: 3 | Status: SHIPPED | OUTPATIENT
Start: 2019-05-10 | End: 2020-01-23

## 2019-05-10 NOTE — PROGRESS NOTES
discussed    Assessment:  Patient Active Problem List   Diagnosis    Depression    Anxiety    Irritable bowel syndrome    Gastroparesis    Fibromyalgia    [facility]    Fatigue    Hypogammaglobulinemia (HCC)    Renal insufficiency    Osteopenia    History of stress fracture    Jejunostomy tube present (HCC)    Gastrointestinal tube present ([address]    H/O sepsis    Essential hypertension    Primary insomnia    Adverse drug effect    Dry mouth    Family history of other condition    Feeding difficulties    Cellulitis    CVID (common variable immunodeficiency) ([facility]    Hypogammaglobulinemia ([facility]     Chronic diarrhea-persistent  Severe gastroparesis-persistent      Plan:  We discussed options today. I did express that I think it's a mistake to pull her gastrostomy tube or her J-tube. She still dependent on J-tube feeds at night. We also discussed possible treatments for her diarrhea. She is already taking a lot of Imodium. I am going to try her on some Questran to see if that decreases the number of bowel movements per day  She will return in 3-4 weeks to see if things are improving.

## 2019-05-13 LAB
BASOPHILS ABSOLUTE: 0 /ΜL
BASOPHILS RELATIVE PERCENT: 1 %
BUN BLDV-MCNC: 14 MG/DL
CALCIUM SERPL-MCNC: 8.9 MG/DL
CHLORIDE BLD-SCNC: 108 MMOL/L
CO2: 28 MMOL/L
CREAT SERPL-MCNC: 0.96 MG/DL
EOSINOPHILS ABSOLUTE: 0.1 /ΜL
EOSINOPHILS RELATIVE PERCENT: 2 %
GFR CALCULATED: NORMAL
GLUCOSE BLD-MCNC: 88 MG/DL
HCT VFR BLD CALC: 30.7 % (ref 36–46)
HEMOGLOBIN: 9.8 G/DL (ref 12–16)
IRON: 28
LYMPHOCYTES ABSOLUTE: 1.3 /ΜL
LYMPHOCYTES RELATIVE PERCENT: 25.3 %
MCH RBC QN AUTO: 25.3 PG
MCHC RBC AUTO-ENTMCNC: 31.9 G/DL
MCV RBC AUTO: 79 FL
MONOCYTES ABSOLUTE: 0.5 /ΜL
MONOCYTES RELATIVE PERCENT: 9.7 %
NEUTROPHILS ABSOLUTE: 3.1 /ΜL
NEUTROPHILS RELATIVE PERCENT: 62 %
PDW BLD-RTO: 17.3 %
PLATELET # BLD: 295 K/ΜL
PMV BLD AUTO: 9.4 FL
POTASSIUM SERPL-SCNC: 4.5 MMOL/L
RBC # BLD: 3.89 10^6/ΜL
SODIUM BLD-SCNC: 142 MMOL/L
TOTAL IRON BINDING CAPACITY: 389
WBC # BLD: 5 10^3/ML

## 2019-05-14 DIAGNOSIS — D80.1 HYPOGAMMAGLOBULINEMIA (HCC): ICD-10-CM

## 2019-05-14 DIAGNOSIS — R53.82 CHRONIC FATIGUE: ICD-10-CM

## 2019-05-14 DIAGNOSIS — I10 ESSENTIAL HYPERTENSION: ICD-10-CM

## 2019-05-14 DIAGNOSIS — N28.9 RENAL INSUFFICIENCY: ICD-10-CM

## 2019-05-20 DIAGNOSIS — I10 ESSENTIAL HYPERTENSION: ICD-10-CM

## 2019-05-20 DIAGNOSIS — R53.82 CHRONIC FATIGUE: ICD-10-CM

## 2019-05-20 DIAGNOSIS — N28.9 RENAL INSUFFICIENCY: ICD-10-CM

## 2019-05-20 DIAGNOSIS — D80.1 HYPOGAMMAGLOBULINEMIA (HCC): ICD-10-CM

## 2019-06-04 ENCOUNTER — TELEPHONE (OUTPATIENT)
Dept: BARIATRICS/WEIGHT MGMT | Age: 69
End: 2019-06-04

## 2019-06-04 DIAGNOSIS — K90.9 INTESTINAL MALABSORPTION: ICD-10-CM

## 2019-06-04 DIAGNOSIS — K31.84 GASTROPARESIS: ICD-10-CM

## 2019-06-04 RX ORDER — CYANOCOBALAMIN 1000 UG/ML
INJECTION INTRAMUSCULAR; SUBCUTANEOUS
Qty: 10 ML | Refills: 2 | Status: SHIPPED | OUTPATIENT
Start: 2019-06-04 | End: 2020-08-25

## 2019-06-04 NOTE — TELEPHONE ENCOUNTER
Pt states that she was advised by [facility] to have feeding tube removed d/t recurrent infections

## 2019-06-05 ENCOUNTER — OFFICE VISIT (OUTPATIENT)
Dept: BARIATRICS/WEIGHT MGMT | Age: 69
End: 2019-06-05
Payer: MEDICARE

## 2019-06-05 VITALS
SYSTOLIC BLOOD PRESSURE: 162 MMHG | HEART RATE: 88 BPM | RESPIRATION RATE: 18 BRPM | DIASTOLIC BLOOD PRESSURE: 100 MMHG | BODY MASS INDEX: 25.77 KG/M2 | HEIGHT: 70 IN | WEIGHT: 180 LBS

## 2019-06-05 DIAGNOSIS — D80.1 HYPOGAMMAGLOBULINEMIA (HCC): Primary | ICD-10-CM

## 2019-06-05 DIAGNOSIS — Z93.1 GASTROINTESTINAL TUBE PRESENT (HCC): ICD-10-CM

## 2019-06-05 PROCEDURE — G8399 PT W/DXA RESULTS DOCUMENT: HCPCS | Performed by: SURGERY

## 2019-06-05 PROCEDURE — 99213 OFFICE O/P EST LOW 20 MIN: CPT | Performed by: SURGERY

## 2019-06-05 PROCEDURE — 3017F COLORECTAL CA SCREEN DOC REV: CPT | Performed by: SURGERY

## 2019-06-05 PROCEDURE — 1036F TOBACCO NON-USER: CPT | Performed by: SURGERY

## 2019-06-05 PROCEDURE — 1123F ACP DISCUSS/DSCN MKR DOCD: CPT | Performed by: SURGERY

## 2019-06-05 PROCEDURE — G8419 CALC BMI OUT NRM PARAM NOF/U: HCPCS | Performed by: SURGERY

## 2019-06-05 PROCEDURE — 1090F PRES/ABSN URINE INCON ASSESS: CPT | Performed by: SURGERY

## 2019-06-05 PROCEDURE — G8427 DOCREV CUR MEDS BY ELIG CLIN: HCPCS | Performed by: SURGERY

## 2019-06-05 PROCEDURE — 4040F PNEUMOC VAC/ADMIN/RCVD: CPT | Performed by: SURGERY

## 2019-06-05 NOTE — PROGRESS NOTES
tube    Plan:  The tube was removed without incident today  She will call if there is any other issues

## 2019-06-07 ENCOUNTER — HOSPITAL ENCOUNTER (EMERGENCY)
Age: 69
Discharge: HOME | End: 2019-06-07
Attending: EMERGENCY MEDICINE
Payer: MEDICARE

## 2019-06-07 ENCOUNTER — TELEPHONE (OUTPATIENT)
Dept: BARIATRICS/WEIGHT MGMT | Age: 69
End: 2019-06-07

## 2019-06-07 VITALS
DIASTOLIC BLOOD PRESSURE: 83 MMHG | OXYGEN SATURATION: 100 % | RESPIRATION RATE: 20 BRPM | SYSTOLIC BLOOD PRESSURE: 167 MMHG | HEART RATE: 80 BPM | TEMPERATURE: 97.7 F | HEIGHT: 70 IN | WEIGHT: 175.04 LBS | BODY MASS INDEX: 25.06 KG/M2

## 2019-06-07 DIAGNOSIS — L30.9 DERMATITIS: Primary | ICD-10-CM

## 2019-06-07 DIAGNOSIS — D83.9 CVID (COMMON VARIABLE IMMUNODEFICIENCY) (HCC): ICD-10-CM

## 2019-06-07 PROCEDURE — 99283 EMERGENCY DEPT VISIT LOW MDM: CPT

## 2019-06-07 ASSESSMENT — PAIN DESCRIPTION - ORIENTATION: ORIENTATION: MID

## 2019-06-07 ASSESSMENT — PAIN DESCRIPTION - PAIN TYPE: TYPE: ACUTE PAIN

## 2019-06-07 ASSESSMENT — ENCOUNTER SYMPTOMS
VOMITING: 0
NAUSEA: 1
ABDOMINAL DISTENTION: 0
EYES NEGATIVE: 1
COLOR CHANGE: 1
RESPIRATORY NEGATIVE: 1
ABDOMINAL PAIN: 0

## 2019-06-07 ASSESSMENT — PAIN DESCRIPTION - LOCATION: LOCATION: ABDOMEN

## 2019-06-07 ASSESSMENT — PAIN DESCRIPTION - PROGRESSION: CLINICAL_PROGRESSION: NOT CHANGED

## 2019-06-07 ASSESSMENT — PAIN DESCRIPTION - ONSET: ONSET: PROGRESSIVE

## 2019-06-07 ASSESSMENT — PAIN DESCRIPTION - FREQUENCY: FREQUENCY: CONTINUOUS

## 2019-06-07 ASSESSMENT — PAIN DESCRIPTION - DESCRIPTORS: DESCRIPTORS: ACHING

## 2019-06-07 ASSESSMENT — PAIN SCALES - GENERAL: PAINLEVEL_OUTOF10: 3

## 2019-06-07 ASSESSMENT — PAIN - FUNCTIONAL ASSESSMENT: PAIN_FUNCTIONAL_ASSESSMENT: ACTIVITIES ARE NOT PREVENTED

## 2019-06-07 NOTE — ED PROVIDER NOTES
[facility]  ED  Emergency Department  Emergency Medicine Resident Sign-out     Care of June Chahal was assumed from Dr. Deion Pinzon and is being seen for Cellulitis  . The patient's initial evaluation and plan have been discussed with the prior provider who initially evaluated the patient. EMERGENCY DEPARTMENT COURSE / MEDICAL DECISION MAKING:       MEDICATIONS GIVEN:  No orders of the defined types were placed in this encounter. LABS / RADIOLOGY:     Labs Reviewed - No data to display    No results found. RECENT VITALS:     Temp: 98.4 °F (36.9 °C),  Pulse: 89, Resp: 16, BP: (!) 161/102, SpO2: 100 %    This patient is a 80 y.o. Female with possible cellulitis around PEG tube site that was removed the past Wednesday which was removed by Dr. Patel Ahuja. No abdomen pain, just increased redness around the site. Warmth on examination. General surgery consulted. Has J tube for food. No issues with J tube. Nanci Solorzano will be down to evaluate patient. Dr. Albarran Postin back at 2:14pm to take back over patient care. Currently still awaiting surgical evaluation. A surgery student was down to see patient. Awaiting recommendations. OUTSTANDING TASKS / RECOMMENDATIONS:    1. Awaiting surgery evaluation. FINAL IMPRESSION:     1. Cellulitis of abdominal wall    2. CVID (common variable immunodeficiency) [facility]        DISPOSITION:         DISPOSITION:  []  Discharge   []  Transfer -    []  Admission -     []  Against Medical Advice   []  Eloped   FOLLOW-UP: No follow-up provider specified.    DISCHARGE MEDICATIONS: New Prescriptions    No medications on file          Britni Allen MD  Emergency Medicine Resident, PGY-2  Anurag Canas MD  Resident  06/07/19 [id number]

## 2019-06-07 NOTE — ED PROVIDER NOTES
[facility]  ED  Emergency Department Encounter  EmergencyMedicine Resident     Pt Name:Valerie Lakhani  MRN: [id number]  [place] 1950  Date of evaluation: 6/7/19  PCP:  TRAY Coy CNP    CHIEF COMPLAINT       Chief Complaint   Patient presents with    Cellulitis       HISTORY OF PRESENT ILLNESS  (Location/Symptom, Timing/Onset, Context/Setting, Quality, Duration, Modifying Factors, Severity.)      Presley Coppola is a 80 y.o. female who presents with increased redness around the PEG tube removal area is concerned for possible cellulitis. States her PEG tube removed last Wednesday / 2 days ago by Dr. Thelma Bonner. She noticed increased redness and mild soreness  around the area, and biliary discharge after taking protonix. Has nausea but no vomiting. No fever or chills, not diabetic. Denies pus like discharge, pain around the site or abdominal pain. She would want to be ready to pick her grandson at airport this Sunday, with no ongoing issues with her PEG tube removal.    Past medical Hx of PEG tube s/p removal 6/19 , was put for complication of vagus nerve due to Jacob fundoplication. Not diabetic. Hx of CVID on weekly sub Q IG. SVT follows Dr. Vinicius Shea. PAST MEDICAL / SURGICAL / SOCIAL / FAMILY HISTORY      has a past medical history of Abdominal pain, Anxiety, CVID (common variable immunodeficiency) ([address] Depression, Diarrhea, Difficulty swallowing, Dizziness, Fibromyalgia, Gastroparesis, Headache, History of blood transfusion, Hypogammaglobulinaemia, unspecified, Irritable bowel syndrome, Jejunostomy tube present ([address] Nausea & vomiting, Numbness, RLS (restless legs syndrome), S/P percutaneous endoscopic gastrostomy (PEG) tube placement ([address] Sepsis ([address] SVT (supraventricular tachycardia) ([address] Wears glasses, and Wears glasses. has a past surgical history that includes Cholecystectomy (1972); Tonsillectomy and adenoidectomy (1963);  Bunionectomy (Right, 1985); knee surgery (Right, 1986); Pyloroplasty (2005); Gastrostomy tube placement (2004); Gastric fundoplication (4005); ileostomy or jejunostomy (2005); Upper gastrointestinal endoscopy (2323-9432); Tunneled venous port placement (2004); Endometrial ablation (2005); Breast biopsy (Left, 8/13/15); Abscess Drainage (03/08/2018); and pr office/outpt visit,procedure only (N/A, 3/8/2018).     Social History     Socioeconomic History    Marital status:      Spouse name: Not on file    Number of children: Not on file    Years of education: Not on file    Highest education level: Not on file   Occupational History    Not on file   Social Needs    Financial resource strain: Not on file    Food insecurity:     Worry: Not on file     Inability: Not on file    Transportation needs:     Medical: Not on file     Non-medical: Not on file   Tobacco Use    Smoking status: Never Smoker    Smokeless tobacco: Never Used   Substance and Sexual Activity    Alcohol use: No    Drug use: No    Sexual activity: Not on file   Lifestyle    Physical activity:     Days per week: Not on file     Minutes per session: Not on file    Stress: Not on file   Relationships    Social connections:     Talks on phone: Not on file     Gets together: Not on file     Attends Voodoo service: Not on file     Active member of club or organization: Not on file     Attends meetings of clubs or organizations: Not on file     Relationship status: Not on file    Intimate partner violence:     Fear of current or ex partner: Not on file     Emotionally abused: Not on file     Physically abused: Not on file     Forced sexual activity: Not on file   Other Topics Concern    Not on file   Social History Narrative    Not on file       Family History   Problem Relation Age of Onset    Hypertension Mother     Heart Disease Paternal Grandfather     Stroke Father     Cancer Brother         KIDNEY AND PROSTATE    Colon Cancer Maternal Uncle     Supplements (PEPTAMEN AF) LIQD Take by mouth Indications: TUBE FEED 1500 CALORIES A DAY   Yes Historical Provider, MD   ondansetron (ZOFRAN) 8 MG tablet   Take 8 mg by mouth every 8 hours as needed  7/9/15  Yes Historical Provider, MD   EPINEPHrine 0.3 MG/0.3ML SOAJ injection Inject 0.3 mg into the muscle. 1/2/15  Yes Historical Provider, MD   Immune Globulin, Human, (HIZENTRA) 10 GM/50ML SOLN   Inject 10 g into the skin every 7 days THURSDAYS   Yes Historical Provider, MD   Cholecalciferol (VITAMIN D-3) 5000 UNITS TABS Take 1 tablet by mouth daily. Yes Historical Provider, MD   Diphenoxylate-Atropine (LOMOTIL PO) Take  by mouth as needed. Yes Historical Provider, MD   flecainide (TAMBOCOR) 50 MG tablet Take 50 mg by mouth daily. Yes Historical Provider, MD   dicyclomine (BENTYL) 20 MG tablet Take 20 mg by mouth every 6 hours as needed. Yes Historical Provider, MD       REVIEW OF SYSTEMS    (2-9 systems for level 4, 10 or more for level 5)      Review of Systems   Constitutional: Negative for chills and fever. HENT: Negative. Eyes: Negative. Respiratory: Negative. Cardiovascular: Negative. Gastrointestinal: Positive for nausea. Negative for abdominal distention, abdominal pain and vomiting. Soreness around PEG tube removed area   Genitourinary: Negative. Musculoskeletal: Negative. Skin: Positive for color change and wound. Redness around the PEG tube removal area. Neurological: Negative. Psychiatric/Behavioral: Negative. PHYSICAL EXAM   (up to 7 for level 4, 8 or more for level 5)      INITIAL VITALS:   BP (!) 167/83   Pulse 80   Temp 97.7 °F (36.5 °C) (Oral)   Resp 20   Ht 5' 10\" (1.778 m)   Wt 175 lb 0.7 oz (79.4 kg)   LMP  (LMP Unknown)   SpO2 100%   BMI 25.12 kg/m²     Physical Exam   Constitutional: She is oriented to person, place, and time. She appears well-developed and well-nourished. No distress. HENT:   Head: Normocephalic and atraumatic. Eyes: Pupils are equal, round, and reactive to light. EOM are normal.   Neck: Normal range of motion. Neck supple. Cardiovascular: Regular rhythm and normal heart sounds. No murmur heard. Pulmonary/Chest: Effort normal and breath sounds normal.   Abdominal: Soft. Bowel sounds are normal. She exhibits no distension. There is no tenderness. Musculoskeletal: Normal range of motion. Neurological: She is alert and oriented to person, place, and time. Skin: Skin is warm. She is not diaphoretic. There is erythema. Redness noted around the PEG tube removed area, warmth felt +. DIFFERENTIAL  DIAGNOSIS     PLAN (LABS / IMAGING / EKG):  Orders Placed This Encounter   Procedures   [employer]     Inpatient consult to General Surgery       MEDICATIONS ORDERED:  No orders of the defined types were placed in this encounter. DDX: Cellulitis            Normal Healing    DIAGNOSTIC RESULTS / EMERGENCY DEPARTMENT COURSE / MDM     LABS:  No results found for this visit on 06/07/19. IMPRESSION:  Dermatitis                             Normal wound healing    RADIOLOGY:  None    EKG  None    All EKG's are interpreted by the Emergency Department Physician who either signs or Co-signs this chart in the absence of a cardiologist.    EMERGENCY DEPARTMENT COURSE:  Dressing done at bedside. Discussed with surgery, superficial dermatitis due to excessive drainage recommend mepelex alginate dressing and stomal care. PROCEDURES:  None    CONSULTS:  IP CONSULT TO GENERAL SURGERY    CRITICAL CARE:  None    FINAL IMPRESSION      1. Dermatitis    2. CVID (common variable immunodeficiency) ([facility]          DISPOSITION / [address]   Referral to [facility] wound care.     PATIENT REFERRED TO:  Edinson Forrester MD  [address]  [phone]    Today  follow up of wound in 1 week      DISCHARGE MEDICATIONS:  New Prescriptions    No medications on file       Sonny Elliott MD  PGY-3, Internal Medicine Resident  [address] [facility]        (Please note that portions of thisnote were completed with a voice recognition program.  Efforts were made to edit the dictations but occasionally words are mis-transcribed.)           Sonny Elliott MD  Resident  06/07/19 [id number]

## 2019-06-07 NOTE — ED PROVIDER NOTES
[facility] ED     Emergency Department     Faculty Attestation    I performed a history and physical examination of the patient and discussed management with the resident. I reviewed the residents note and agree with the documented findings and plan of care. Any areas of disagreement are noted on the chart. I was personally present for the key portions of any procedures. I have documented in the chart those procedures where I was not present during the key portions. I have reviewed the emergency nurses triage note. I agree with the chief complaint, past medical history, past surgical history, allergies, medications, social and family history as documented unless otherwise noted below. For Physician Assistant/ Nurse Practitioner cases/documentation I have personally evaluated this patient and have completed at least one if not all key elements of the E/M (history, physical exam, and MDM). Additional findings are as noted. Recent PEG tube removal midabdomen, significant erythema warmth around site. History of CVID, gets IgG at [facility], denies fevers abdominal pain and vomiting.   We'll call surgery team for evaluation      Critical Care     none    Niru King MD, [facility]  Attending Emergency  Physician             Niru King MD  06/07/19 [id number]

## 2019-06-07 NOTE — ED PROVIDER NOTES
FACULTY SIGN-OUT  ADDENDUM       Patient: Gem Ortiz   MRN: [id number]  PCP:  TRAY Toledo CNP  The patient's initial evaluation and plan have been discussed with the prior provider who initially evaluated the patient. Nursing Notes, Past Medical Hx, Past Surgical Hx, Social Hx, Allergies, and Family Hx were all reviewed. Pertinent Comments: The patient is a 80 y.o. female taken in signout with recent removal of G-tube and either early cellulitis versus gastric secretion irritation at the site. We are awaiting surgery evaluation and disposition    ED COURSE      The patient was given the following medications:  No orders of the defined types were placed in this encounter.       RECENT VITALS:   BP: (!) 167/83  Pulse: 80  Resp: 20  Temp: 97.7 °F (36.5 °C) SpO2: 100 %    (Please note that portions of this note were completed with a voice recognition program.  Efforts were made to edit the dictations but occasionally words are mis-transcribed.)    MD Conor Winterson  Attending Emergency Medicine Physician        Pedro Fuentes MD  06/07/19 [id number]

## 2019-06-07 NOTE — ED PROVIDER NOTES
FACULTY SIGN-OUT  ADDENDUM     Care of this patient was assumed from previous attending physician. The patient was seen for Cellulitis  . The patient's initial evaluation and plan have been discussed with Dr. Jesus Foley who initially evaluated the patient. ED COURSE      The patient was given the following medications:  No orders of the defined types were placed in this encounter. RECENT VITALS:   Temp: 97.7 °F (36.5 °C), Pulse: 80, Resp: 20, BP: (!) 167/83    MEDICAL DECISION MAKING        Maria R Ramos is a 80 y.o. female who presents to the Emergency Department with complaints of erythema around the G-tube site which recently was told by surgery. Currently waiting surgery evaluation. Disposition per surgery. Roddy Beckford M.D., [facility]  Emergency Medicine Attending         Juliette Hagan MD  06/07/19 [id number]

## 2019-06-07 NOTE — PROGRESS NOTES
General Surgery:  Consult Note        PATIENT NAME: Presley Coppola   YOB: 1950    ADMISSION DATE: 6/7/2019 10:00 AM     Admitting Provider: Dr. Nieves Latin Physician: Dr. Joanne Gu. Mirtha     TODAY'S DATE: 6/7/2019    Chief Complaint: Cellulitis of abdomen   Consult Regarding:  Cellulitis around old PEG tube site removed 2 days ago    HISTORY OF PRESENT ILLNESS:  The patient is a 80 y.o. female  who was seen in the ED on 6/7/19 for cellulitis around old PEG tube site. 2 days ago the patient saw Dr. Thelma Bonner at clinic where he removed the PEG tube after she had it for 14 years. Patient states over the last 2 days the PEG site had significant drainage of bile like substance soaking through several gauze pads at a time. Patient states after removal of the PEG, the surrounding skin became painful, erythematous, and warm to the touch. Patient also complains of nausea, headache, chills, and fatigue. She is unable to vomit due to Nissen. She has had no changes in bowel movements from her normal diarrhea due to dumping syndrome. Patient states she feel dehydrated so she put water in her J-tube several times yesterday and today at 125mL each time. She took some of her husbands Nexium which decreased the drainage from the PEG site. Patient has also been using Clotrimazole Dexamethasone cream since Wednesday. Past medical history significant for CVID with IgG treatment at [facility], Cholecystectomy, abdominoplasty, J-tube, Dumping syndrome, and hypertension.         Past Medical History:        Diagnosis Date    Abdominal pain     Anxiety     CVID (common variable immunodeficiency) (HCC)     Depression     Diarrhea     Difficulty swallowing     Dizziness     Fibromyalgia     Gastroparesis     Headache     History of blood transfusion     Hypogammaglobulinaemia, unspecified 2013    Irritable bowel syndrome     [facility])     USES TO FEED SELF , PEPTAMEN AF 1500 BESSY A DAY    file   Tobacco Use    Smoking status: Never Smoker    Smokeless tobacco: Never Used   Substance and Sexual Activity    Alcohol use: No    Drug use: No    Sexual activity: Not on file   Lifestyle    Physical activity:     Days per week: Not on file     Minutes per session: Not on file    Stress: Not on file   Relationships    Social connections:     Talks on phone: Not on file     Gets together: Not on file     Attends [employer] service: Not on file     Active member of club or organization: Not on file     Attends meetings of clubs or organizations: Not on file     Relationship status: Not on file    Intimate partner violence:     Fear of current or ex partner: Not on file     Emotionally abused: Not on file     Physically abused: Not on file     Forced sexual activity: Not on file   Other Topics Concern    Not on file   Social History Narrative    Not on file       Family History:       Problem Relation Age of Onset    Hypertension Mother     Heart Disease Paternal Grandfather     Stroke Father     Cancer Brother         KIDNEY AND PROSTATE    Colon Cancer Maternal Uncle     Cancer Maternal Grandmother         NON HODGINS LYMPHOMA    Cancer Maternal Grandfather         LUNG AND ESOPHAGEAL       REVIEW OF SYSTEMS:    CONSTITUTIONAL: Denies recent weight loss. Positive fatigue, chills. HEENT: Denies rhinorrhea, dysphagia, odynphagia. CARDIOVASCULAR: Denies history of MI, recent chest pain. RESPIRATORY: Denies recent history of shortness of breath or history of PE. GASTROINTESTINAL: Dumping syndrome -diarrhea, gastroparesis e  GENITOURINARY: Denies increased frequency or dysuria. HEMATOLOGIC/LYMPHATIC: Common Variable Immunodeficiency  ENDOCRINE: Denies history of thyroid problems or diabetes. NEURO: Denies history of CVA, TIA. Review of systems negative unless listed above.     PHYSICAL EXAM:    VITALS:  BP (!) 167/83   Pulse 80   Temp 97.7 °F (36.5 °C) (Oral)   Resp 20   Ht 5' 10\" (1.778 m)   Wt 175 lb 0.7 oz (79.4 kg)   LMP  (LMP Unknown)   SpO2 100%   BMI 25.12 kg/m²   INTAKE/OUTPUT:   No intake or output data in the 24 hours ending 06/07/19 1525    CONSTITUTIONAL:  awake, alert, mild distress, not distressed and normal weight  HEENT: Normocephalic/atraumatic, without obvious abnormality. NECK:  Supple, symmetrical, trachea midline   CARDIOVASCULAR: Regular rate and rhythm without murmurs. LUNGS: Clear to auscultation bilaterally without evidence of wheezing or tachypnea. ABDOMEN: Erythema, swelling, clear fluid drainage around previous PEG tube site   MUSCULOSKELETAL: Muscle strength intact in all extremities bilaterally. NEUROLOGIC: CN II- XII intact. Gross motor intact without focal weakness. SKIN: Red rash around old PEG tube site. Orientation:   oriented to person, place, and time        Pertinent Radiology:   No results found. ASSESSMENT:  There are no active hospital problems to display for this patient. 81 yo. F. Presenting with contact dermatitis after removal of long-standing Gastrostomy tube site       Plan:  1. Diet: NPO  2. F/u Labs: CBC, BMP, Lactic acid  3. Plan to send home       Electronically signed by Jamaican Me  on 6/7/2019 at 3:25 PM     Patient seen and examined. She complains of significant drainage from recently removed PEG tube site. This has caused some skin irritation. She denies any fevers, chills, abdominal pain, nausea, or vomiting. Afebrile. General: awake and alert. NAD. Non-toxic appearing. Heart: RRR  Lungs: resp even and unlabored  Abdomen:  with dermatitis at previous PEG tube site. Gastric contents draining from site. Abdomen is soft, non-distended, non-tender. [place] tube in place without erythema or drainage     - Wound Care: site cleaned. Stomal powder applied, followed by barrier wipes. Alginate absorbent dressing applied and covered with mepliex.    - referral to [facility] wound care center  - follow up sooner if any signs of infection or worsening drainage       Electronically signed by Herman Raymundo DO on 6/7/2019 at 5:09 PM

## 2019-06-07 NOTE — ED TRIAGE NOTES
Patient states that she just had a PEG tube removed and noticed increased redness and drainage from old site. Patient c/o achy pain but denies any problems with new PEG tube.

## 2019-06-11 ENCOUNTER — HOSPITAL ENCOUNTER (OUTPATIENT)
Dept: WOUND CARE | Age: 69
Discharge: HOME | End: 2019-06-11
Payer: MEDICARE

## 2019-06-11 VITALS
TEMPERATURE: 98.3 F | HEART RATE: 85 BPM | DIASTOLIC BLOOD PRESSURE: 80 MMHG | SYSTOLIC BLOOD PRESSURE: 120 MMHG | RESPIRATION RATE: 18 BRPM

## 2019-06-11 DIAGNOSIS — K31.6 GASTROCUTANEOUS FISTULA DUE TO GASTROSTOMY TUBE: ICD-10-CM

## 2019-06-11 PROCEDURE — 99203 OFFICE O/P NEW LOW 30 MIN: CPT

## 2019-06-11 ASSESSMENT — PAIN SCALES - GENERAL: PAINLEVEL_OUTOF10: 2

## 2019-06-11 ASSESSMENT — PAIN DESCRIPTION - PAIN TYPE: TYPE: ACUTE PAIN

## 2019-06-11 NOTE — PROGRESS NOTES
Gastrostomy site care. Skin around the site of a previous gastrostomy erythemic/denuded/painful due to leakage of gastric contents. The site and perimeter were gently cleansed with warm water and patted dry. A crust was created around the site using layers of stoma powder and skin prep barrier wipe. A moldable ring was used to protect the skin just around the site of the gastrostomy opening and a convex drainable one piece appliance was placed over the site. The patient and her grandson were educated on the placement of this pouch to allow for healing of skin until the gastrostomy site closes.

## 2019-06-11 NOTE — PLAN OF CARE
Problem: Pain:  Goal: Pain level will decrease  Description  Pain level will decrease  Outcome: Ongoing  Goal: Control of acute pain  Description  Control of acute pain  Outcome: Ongoing  Goal: Control of chronic pain  Description  Control of chronic pain  Outcome: Ongoing     Problem: Wound:  Goal: Will show signs of wound healing; wound closure and no evidence of infection  Description  Will show signs of wound healing; wound closure and no evidence of infection  Outcome: Ongoing     Problem: Falls - Risk of:  Goal: Will remain free from falls  Description  Will remain free from falls  Outcome: Ongoing

## 2019-06-11 NOTE — PROGRESS NOTES
[facility]   Progress Note and Procedure Note      Pura Keith  MEDICAL RECORD NUMBER:  [id number]  AGE: 80 y.o. GENDER: female  : 1950  EPISODE DATE:  2019    Subjective:     Chief Complaint   Patient presents with    Wound Check     gastrostomy site         HISTORY of PRESENT ILLNESS HPI     Pura Keith is a 80 y.o. female who presents today for wound/ulcer evaluation. 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 She has had a PEG for 14 years following a nissen fundoplication resulting in gastroparesis. She has a Jejunostomy tube in place for feeding and typically takes very little by mouth. She has been NPO except for medications since her PEG was removed. She developed significant skin excoriation and contact dermatitis around the PEG due to stomach acid in contact with her skin. She has had an ostomy pouch in place for the past 5 days with significant improvement.     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    Contributing Factors: malnutrition and gastroparesis    Wound: gastrocutaneous fistula        PAST MEDICAL HISTORY        Diagnosis Date    Abdominal pain     Anxiety     CVID (common variable immunodeficiency) (HCC)     Depression     Diarrhea     Difficulty swallowing     Dizziness     Fibromyalgia     Gastroparesis     Headache     History of blood transfusion     Hypogammaglobulinaemia, unspecified     Irritable bowel syndrome     Jejunostomy tube present (HCC)     USES TO FEED SELF , PEPTAMEN AF 1500 BESSY A DAY    Nausea & vomiting     Numbness     RLS (restless legs syndrome)     S/P percutaneous endoscopic gastrostomy (PEG) tube placement ([address]     USES TO DRAIN STOMACH    Sepsis ([address] 06/2006    SVT (supraventricular tachycardia) ([address] 2006    ON RX    Wears glasses     Wears glasses        PAST SURGICAL HISTORY    Past Surgical History:   Procedure Laterality Date    ABSCESS DRAINAGE  03/08/2018     near peg tube- local    BREAST BIOPSY Left 8/13/15    BUNIONECTOMY Right [address]    ENDOMETRIAL ABLATION  2005    GASTRIC FUNDOPLICATION  8289    sx that injured the vagus nerves and caused gastroparesis    GASTROSTOMY TUBE PLACEMENT  2004    for stomach drainage    ILEOSTOMY OR JEJUNOSTOMY  2005    J-tube for feeding    KNEE SURGERY Right 1986    tumor    DC OFFICE/OUTPT VISIT,PROCEDURE ONLY N/A 3/8/2018    INCISION AND DRAINAGE ABSCESS NEAR PEG TUBE performed by Popeye Antoine IV, DO at [address]  2005    done to help gastroparesis fr vagus nerve injury   [facility] [address] TUNNELED VENOUS PORT PLACEMENT  2004    REMOVED 2 YRS LATER    UPPER GASTROINTESTINAL ENDOSCOPY  1993-2012    X 15 SOME WITH BX., SOME FOR DILATATION       FAMILY HISTORY    Family History   Problem Relation Age of Onset    Hypertension Mother     Heart Disease Paternal Grandfather     Stroke Father     Cancer Brother         KIDNEY AND PROSTATE    Colon Cancer Maternal Uncle     Cancer Maternal Grandmother         NON HODGINS LYMPHOMA    Cancer Maternal Grandfather         LUNG AND ESOPHAGEAL       SOCIAL HISTORY    Social History     Tobacco Use    Smoking status: Never Smoker    Smokeless tobacco: Never Used   Substance Use Topics    Alcohol use: No    Drug use: No       ALLERGIES    Allergies   Allergen Reactions    Macrodantin [Nitrofurantoin Macrocrystal] Hives    Compazine [Prochlorperazine] Other (See Comments)     HYPER    Phenergan [Promethazine Hcl] Other (See Comments)     HYPER    Reglan [Metoclopramide] Other (See Comments)     HYPER, AGGITATED      Sulfa Antibiotics Rash    Vancomycin Other (See Comments)     HALLUCINATON    Vfend [Voriconazole] Other (See Comments)     hallucinations       MEDICATIONS    Current Outpatient Medications on File Prior to Encounter   Medication Sig Dispense Refill    cyanocobalamin 1000 MCG/ML injection inject 1 milliliter intramuscularly EVERY 1ST OF THE MONTH 10 mL 2    cholestyramine (QUESTRAN) 4 g packet Take 1 packet by mouth 2 times daily 90 packet 3    sertraline (ZOLOFT) 100 MG tablet TAKE 1 AND 1/2 TABLETS BY  MOUTH DAILY 135 tablet 3    clotrimazole-betamethasone (LOTRISONE) 1-0.05 % cream apply topically to affected area twice a day 1 Tube 0    ibuprofen (ADVIL;MOTRIN) 600 MG tablet TAKE 1 TABLET BY MOUTH  EVERY 8 HOURS AS NEEDED FOR PAIN 270 tablet 2    B-D 3CC LUER-JUSTIN SYR 21GX1\" 21G X 1\" 3 ML MISC USE AS DIRECTED WITH VITAMIN B EVERY MONTH 100 each 3    Elastic Bandages & Supports (MEDICAL COMPRESSION STOCKINGS) MISC Knee high 20 -30 mm hg 1 each 2    lactobacillus (BACID) TABS take 1 tablet by mouth three times a day 180 tablet 3    nystatin (MYCOSTATIN) 696733 UNIT/GM ointment Apply topically 2 times daily. 30 g 2    diltiazem (CARDIZEM) 60 MG tablet Take 90 mg by mouth 2 times daily       loperamide (IMODIUM) 2 MG capsule Take 2 mg by mouth 4 times daily as needed for Diarrhea (TAKES WITH LOMOTIL)      Nutritional Supplements (PEPTAMEN AF) LIQD Take by mouth Indications: TUBE FEED 1500 CALORIES A DAY      ondansetron (ZOFRAN) 8 MG tablet   Take 8 mg by mouth every 8 hours as needed       EPINEPHrine 0.3 MG/0.3ML SOAJ injection Inject 0.3 mg into the muscle.  Immune Globulin, Human, (HIZENTRA) 10 GM/50ML SOLN   Inject 10 g into the skin every 7 days THURSDAYS      Cholecalciferol (VITAMIN D-3) 5000 UNITS TABS Take by mouth daily       Diphenoxylate-Atropine (LOMOTIL PO) Take  by mouth as needed.       flecainide (TAMBOCOR) 50 MG tablet Take 50 mg by mouth daily.  dicyclomine (BENTYL) 20 MG tablet Take 20 mg by mouth every 6 hours as needed. No current facility-administered medications on file prior to encounter.         REVIEW OF SYSTEMS    A comprehensive review of systems was negative except for: Gastrointestinal: positive for abdominal pain and of skin of abdomen and gasttroparesis    Objective:      /80   Pulse 85   Temp 98.3 °F (36.8 °C)   Resp 18   LMP  (LMP Unknown)     Wt Readings from Last 3 Encounters:   06/07/19 175 lb 0.7 oz (79.4 kg)   06/05/19 180 lb (81.6 kg)   05/10/19 181 lb (82.1 kg)       PHYSICAL EXAM    General Appearance: alert and oriented to person, place and time, well developed and well- nourished, in no acute distress  Skin: warm and dry, erythema of abdominal wall due to contact dermatitis  Head: normocephalic and atraumatic  Eyes: pupils equal, round, and reactive to light, extraocular eye movements intact, conjunctivae normal  Neck: supple and non-tender without mass, no cervical lymphadenopathy  Pulmonary/Chest: clear to auscultation bilaterally- no wheezes, rales or rhonchi, normal air movement, no respiratory distress  Cardiovascular: normal rate, regular rhythm  Abdomen: soft, non-tender, non-distended, normal bowel sounds, no masses or organomegaly, gastrocutaneous fistula upper abdomen  Extremities: no cyanosis, clubbing or edema  Musculoskeletal: normal range of motion, no joint swelling, deformity or tenderness  Neurologic: reflexes normal and symmetric, no cranial nerve deficit, gait, coordination and speech normal      Assessment:        Problem List Items Addressed This Visit     None           Procedure Note  Indications:  Based on my examination of this patient's wound(s)/ulcer(s) today, debridement is not required to promote healing and evaluate the wound base due to fistula      Post Debridement Measurements:  Wound/Ulcer Descriptions are Pre Debridement except measurements:    Incision Breast Left (Active)   Number of days:        Incision 03/08/18 Abdomen (Active)   Number of days: 459       Wound 06/11/19 Abdomen Mid #1 zonia gastrostomy (Active)   Wound Image   6/11/2019  2:19 PM   Wound Other 6/11/2019  2:19 PM   Dressing Status Old drainage 6/11/2019  2:19 PM   Dressing Changed Changed/New 6/11/2019  2:19 PM   Wound Cleansed Other (Comment) 6/11/2019  2:19 PM   Wound Length (cm) 4 cm 6/11/2019  2:19 PM   Wound Width (cm) 5.8 cm 6/11/2019  2:19 PM   Wound Depth (cm) 0.1 cm 6/11/2019  2:19 PM   Wound Surface Area (cm^2) 23.2 cm^2 6/11/2019  2:19 PM   Wound Volume (cm^3) 2.32 cm^3 6/11/2019  2:19 PM   Post-Procedure Length (cm) 4 cm 6/11/2019  2:19 PM   Post-Procedure Width (cm) 5.8 cm 6/11/2019  2:19 PM   Post-Procedure Depth (cm) 0.1 cm 6/11/2019  2:19 PM   Post-Procedure Surface Area (cm^2) 23.2 cm^2 6/11/2019  2:19 PM   Post-Procedure Volume (cm^3) 2.32 cm^3 6/11/2019  2:19 PM   Red%Wound Bed 100 6/11/2019  2:19 PM   Number of days: 0          Percent of Wound(s)/Ulcer(s) Debrided: 0%    Total Surface Area Debrided:  0 sq cm           Plan:     Treatment Note please see attached Discharge Instructions    Written patient dismissal instructions given to patient and signed by patient or POA. Ostomy pouch applied around fistula to limit contact with skin. Apply lotrizone cream to abdominal skin around ostomy. Maintain NPO with J tube feeding. Follow up 2 weeks    Discharge Instructions         [address] and [address] [facility]      Visit  Discharge Instructions / Physician Orders  DATE: 06/11/19    Home Care:    SUPPLIES ORDERED THRU:    Wound Location:  Abdomen    Cleanse with:  Liquid antibacterial soap and water, rinse well                   Dressing Orders: Ostomy pouch to wound.  May use lotrisone cream to periwound area    Frequency: As needed as long as it stays sealed and is not leaking on skin                    Additional Orders: Increase protein to diet (meat, cheese, eggs, fish, peanut butter, nuts and beans)  Multivitamin daily    HYPERBARIC TREATMENT-                TREATMENT #    Your next appointment with [facility] is in 2 weeks    ROOM TYPE   [] CHAIR     [] BED   [] EITHER        TIME [] 45 MIN     [] 60 MIN    (Please note your next appointment above and if you are unable to keep, kindly give a 24 hour notice. Thank you.)    If you experience any of the following, please call the [facility] during business hours:  [phone]    * Increase in Pain  * Temperature over 101  * Increase in drainage from your wound  * Drainage with a foul odor  * Bleeding  * Increase in swelling  * Need for compression bandage changes due to slippage, breakthrough drainage. If you need medical attention outside of the business hours of the [facility] please contact your PCP or go to the nearest emergency room.     AVS REVIEWED    YES []    Patient Signature:__________________________________________________Date:_______  Electronically signed by Ankit Cadena RN on 6/11/2019 at 2:33 PM  Electronically signed by Simeon Otoole DO on 6/11/2019 at 2:38 PM                    Electronically signed by Simeon Otoole DO on 6/11/2019 at 2:39 PM

## 2019-06-17 ENCOUNTER — OFFICE VISIT (OUTPATIENT)
Dept: PRIMARY CARE CLINIC | Age: 69
End: 2019-06-17
Payer: MEDICARE

## 2019-06-17 ENCOUNTER — HOSPITAL ENCOUNTER (OUTPATIENT)
Age: 69
Discharge: HOME | End: 2019-06-17
Payer: MEDICARE

## 2019-06-17 VITALS
RESPIRATION RATE: 18 BRPM | WEIGHT: 168.8 LBS | HEART RATE: 68 BPM | SYSTOLIC BLOOD PRESSURE: 102 MMHG | BODY MASS INDEX: 24.17 KG/M2 | DIASTOLIC BLOOD PRESSURE: 66 MMHG | HEIGHT: 70 IN

## 2019-06-17 DIAGNOSIS — K31.6 GASTROCUTANEOUS FISTULA DUE TO GASTROSTOMY TUBE: ICD-10-CM

## 2019-06-17 DIAGNOSIS — K58.0 IRRITABLE BOWEL SYNDROME WITH DIARRHEA: ICD-10-CM

## 2019-06-17 DIAGNOSIS — F41.9 ANXIETY: ICD-10-CM

## 2019-06-17 DIAGNOSIS — F51.01 PRIMARY INSOMNIA: ICD-10-CM

## 2019-06-17 DIAGNOSIS — Z93.4 JEJUNOSTOMY TUBE PRESENT (HCC): ICD-10-CM

## 2019-06-17 DIAGNOSIS — F32.4 MAJOR DEPRESSIVE DISORDER, SINGLE EPISODE, IN PARTIAL REMISSION (HCC): ICD-10-CM

## 2019-06-17 DIAGNOSIS — I10 ESSENTIAL HYPERTENSION: Primary | ICD-10-CM

## 2019-06-17 DIAGNOSIS — K31.84 GASTROPARESIS: ICD-10-CM

## 2019-06-17 LAB
ALBUMIN SERPL-MCNC: 4.4 G/DL (ref 3.5–5.2)
ALBUMIN/GLOBULIN RATIO: 1.3 (ref 1–2.5)
ALP BLD-CCNC: 119 U/L (ref 35–104)
ALT SERPL-CCNC: 28 U/L (ref 5–33)
ANION GAP SERPL CALCULATED.3IONS-SCNC: 16 MMOL/L (ref 9–17)
AST SERPL-CCNC: 24 U/L
BILIRUB SERPL-MCNC: 0.3 MG/DL (ref 0.3–1.2)
BUN BLDV-MCNC: 32 MG/DL (ref 8–23)
BUN/CREAT BLD: ABNORMAL (ref 9–20)
CALCIUM SERPL-MCNC: 9.4 MG/DL (ref 8.6–10.4)
CHLORIDE BLD-SCNC: 103 MMOL/L (ref 98–107)
CO2: 21 MMOL/L (ref 20–31)
CREAT SERPL-MCNC: 1.23 MG/DL (ref 0.5–0.9)
GFR AFRICAN AMERICAN: 53 ML/MIN
GFR NON-AFRICAN AMERICAN: 44 ML/MIN
GFR SERPL CREATININE-BSD FRML MDRD: ABNORMAL ML/MIN/{1.73_M2}
GFR SERPL CREATININE-BSD FRML MDRD: ABNORMAL ML/MIN/{1.73_M2}
GLUCOSE BLD-MCNC: 98 MG/DL (ref 70–99)
POTASSIUM SERPL-SCNC: 4.3 MMOL/L (ref 3.7–5.3)
SODIUM BLD-SCNC: 140 MMOL/L (ref 135–144)
TOTAL PROTEIN: 7.8 G/DL (ref 6.4–8.3)

## 2019-06-17 PROCEDURE — 36415 COLL VENOUS BLD VENIPUNCTURE: CPT

## 2019-06-17 PROCEDURE — 4040F PNEUMOC VAC/ADMIN/RCVD: CPT | Performed by: NURSE PRACTITIONER

## 2019-06-17 PROCEDURE — 1123F ACP DISCUSS/DSCN MKR DOCD: CPT | Performed by: NURSE PRACTITIONER

## 2019-06-17 PROCEDURE — G8420 CALC BMI NORM PARAMETERS: HCPCS | Performed by: NURSE PRACTITIONER

## 2019-06-17 PROCEDURE — 3017F COLORECTAL CA SCREEN DOC REV: CPT | Performed by: NURSE PRACTITIONER

## 2019-06-17 PROCEDURE — 80053 COMPREHEN METABOLIC PANEL: CPT

## 2019-06-17 PROCEDURE — G8427 DOCREV CUR MEDS BY ELIG CLIN: HCPCS | Performed by: NURSE PRACTITIONER

## 2019-06-17 PROCEDURE — 1090F PRES/ABSN URINE INCON ASSESS: CPT | Performed by: NURSE PRACTITIONER

## 2019-06-17 PROCEDURE — G8399 PT W/DXA RESULTS DOCUMENT: HCPCS | Performed by: NURSE PRACTITIONER

## 2019-06-17 PROCEDURE — 99214 OFFICE O/P EST MOD 30 MIN: CPT | Performed by: NURSE PRACTITIONER

## 2019-06-17 PROCEDURE — 1036F TOBACCO NON-USER: CPT | Performed by: NURSE PRACTITIONER

## 2019-06-17 RX ORDER — LORAZEPAM 1 MG/1
TABLET ORAL
Qty: 30 TABLET | Refills: 0 | Status: SHIPPED | OUTPATIENT
Start: 2019-06-17 | End: 2019-11-07 | Stop reason: SDUPTHER

## 2019-06-17 RX ORDER — LOPERAMIDE HYDROCHLORIDE 2 MG/1
2 CAPSULE ORAL 4 TIMES DAILY PRN
Qty: 120 CAPSULE | Refills: 1 | Status: SHIPPED | OUTPATIENT
Start: 2019-06-17 | End: 2019-11-07

## 2019-06-17 ASSESSMENT — ENCOUNTER SYMPTOMS
SHORTNESS OF BREATH: 0
VOMITING: 0
NAUSEA: 0
SINUS PRESSURE: 0
SORE THROAT: 0
WHEEZING: 0
DIARRHEA: 1
ABDOMINAL PAIN: 1
CONSTIPATION: 0
COUGH: 0
BLOOD IN STOOL: 0
TROUBLE SWALLOWING: 0

## 2019-06-17 NOTE — PROGRESS NOTES
[facility] PRIMARY CARE  [address] [place] [address] [address]  [place] [address]  Dept: [phone]  Dept Fax: [phone]    Maria R Ramos is a 80 y.o. female who presentstoday for her medical conditions/complaints as noted below. Maria R Ramos is c/o of  Chief Complaint   Patient presents with    Hypertension     6 month visit         HPI:     Here today for follow up  Reports has been placed back on continuous tube feelings via her J tube for the past week  Has not been able to eat/drink for about 2 weeks total  Continue to have large amounts drainage from old PEG site, seeing wound care and now covering with colostomy bag to collect bile drainage  She is under the care of Dr Luis Underwood, getting 3 cans of Ensure and minimal water, has follow up in 2 days  She has been feeling poorly, tiring quickly  Somewhat despondent about her recurrent and persistent GI issues    BP has been low past couple weeks  Cardio had increased cardizem as had been running high  Has follow up with him Wednesday    Hypertension   This is a chronic problem. The problem has been waxing and waning since onset. Associated symptoms include malaise/fatigue. Pertinent negatives include no chest pain, headaches, palpitations, peripheral edema or shortness of breath. Past treatments include calcium channel blockers. The current treatment provides significant improvement. There are no compliance problems. There is no history of CAD/MI or CVA.        No results found for: LABA1C          ( goal A1C is < 7)   No results found for: LABMICR  LDL Calculated (mg/dL)   Date Value   06/28/2018 108   09/20/2017 107       (goal LDL is <100)   AST (U/L)   Date Value   06/17/2019 24     ALT (U/L)   Date Value   06/17/2019 28     BUN (mg/dL)   Date Value   06/17/2019 32 (H)     BP Readings from Last 3 Encounters:   06/17/19 102/66   06/11/19 120/80   06/07/19 (!) 167/83          ([id number])    Past Medical LUNG AND ESOPHAGEAL          Social History     Tobacco Use    Smoking status: Never Smoker    Smokeless tobacco: Never Used   Substance Use Topics    Alcohol use: No      Current Outpatient Medications   Medication Sig Dispense Refill    loperamide (IMODIUM) 2 MG capsule Take 1 capsule by mouth 4 times daily as needed for Diarrhea (TAKES WITH LOMOTIL) 120 capsule 1    LORazepam (ATIVAN) 1 MG tablet take 1 tablet by mouth every evening 30 tablet 0    cyanocobalamin 1000 MCG/ML injection inject 1 milliliter intramuscularly EVERY 1ST OF THE MONTH 10 mL 2    cholestyramine (QUESTRAN) 4 g packet Take 1 packet by mouth 2 times daily 90 packet 3    sertraline (ZOLOFT) 100 MG tablet TAKE 1 AND 1/2 TABLETS BY  MOUTH DAILY 135 tablet 3    clotrimazole-betamethasone (LOTRISONE) 1-0.05 % cream apply topically to affected area twice a day 1 Tube 0    ibuprofen (ADVIL;MOTRIN) 600 MG tablet TAKE 1 TABLET BY MOUTH  EVERY 8 HOURS AS NEEDED FOR PAIN 270 tablet 2    B-D 3CC LUER-JUSTIN SYR 21GX1\" 21G X 1\" 3 ML MISC USE AS DIRECTED WITH VITAMIN B EVERY MONTH 100 each 3    Elastic Bandages & Supports (MEDICAL COMPRESSION STOCKINGS) MISC Knee high 20 -30 mm hg 1 each 2    lactobacillus (BACID) TABS take 1 tablet by mouth three times a day 180 tablet 3    diltiazem (CARDIZEM) 60 MG tablet Take 90 mg by mouth 2 times daily       Nutritional Supplements (PEPTAMEN AF) LIQD Take by mouth Indications: TUBE FEED 1500 CALORIES A DAY      ondansetron (ZOFRAN) 8 MG tablet   Take 8 mg by mouth every 8 hours as needed       EPINEPHrine 0.3 MG/0.3ML SOAJ injection Inject 0.3 mg into the muscle.  Immune Globulin, Human, (HIZENTRA) 10 GM/50ML SOLN   Inject 10 g into the skin every 7 days THURSDAYS      Cholecalciferol (VITAMIN D-3) 5000 UNITS TABS Take by mouth daily       Diphenoxylate-Atropine (LOMOTIL PO) Take  by mouth as needed.  flecainide (TAMBOCOR) 50 MG tablet Take 50 mg by mouth daily.       dicyclomine (BENTYL) 20 MG tablet Take 20 mg by mouth every 6 hours as needed.  nystatin (MYCOSTATIN) 505433 UNIT/GM ointment Apply topically 2 times daily. 30 g 2     No current facility-administered medications for this visit. Allergies   Allergen Reactions    Macrodantin [Nitrofurantoin Macrocrystal] Hives    Compazine [Prochlorperazine] Other (See Comments)     HYPER    Phenergan [Promethazine Hcl] Other (See Comments)     HYPER    Reglan [Metoclopramide] Other (See Comments)     HYPER, AGGITATED      Sulfa Antibiotics Rash    Vancomycin Other (See Comments)     HALLUCINATON    Vfend [Voriconazole] Other (See Comments)     hallucinations       Health Maintenance   Topic Date Due    Potassium monitoring  05/13/2020    Creatinine monitoring  05/13/2020    Breast cancer screen  02/28/2021    Colon cancer screen colonoscopy  01/12/2022    Lipid screen  06/28/2023    DTaP/Tdap/Td vaccine (2 - Td) 04/01/2026    DEXA (modify frequency per FRAX score)  Completed    Flu vaccine  Completed    Pneumococcal 65+ years Vaccine  Completed    Hepatitis C screen  Completed       Subjective:      Review of Systems   Constitutional: Positive for malaise/fatigue. Negative for activity change, appetite change, chills, fatigue, fever and unexpected weight change. HENT: Negative for congestion, ear pain, hearing loss, sinus pressure, sore throat and trouble swallowing. Eyes: Negative for visual disturbance. Respiratory: Negative for cough, shortness of breath and wheezing. Cardiovascular: Negative for chest pain, palpitations and leg swelling. Gastrointestinal: Positive for abdominal pain and diarrhea. Negative for blood in stool, constipation, nausea and vomiting. Endocrine: Negative for cold intolerance, heat intolerance, polydipsia, polyphagia and polyuria. Genitourinary: Negative for difficulty urinating, frequency, hematuria and urgency. Musculoskeletal: Negative for arthralgias and myalgias.    Skin: Negative for rash. Allergic/Immunologic: Negative for environmental allergies. Neurological: Negative for dizziness, weakness, light-headedness and headaches. Psychiatric/Behavioral: Negative for confusion. The patient is not nervous/anxious. Objective:     Physical Exam   Constitutional: She is oriented to person, place, and time. She appears well-developed and well-nourished. HENT:   Head: Normocephalic. Eyes: Pupils are equal, round, and reactive to light. Conjunctivae and EOM are normal.   Neck: Normal range of motion. Cardiovascular: Normal rate, regular rhythm, normal heart sounds and intact distal pulses. No murmur heard. Pulmonary/Chest: Effort normal and breath sounds normal. She has no wheezes. Abdominal: Soft. Bowel sounds are normal. She exhibits no distension. There is tenderness. J-tube with continuous feeding pump, ostomy bag over former G-tube site, draining bile   Musculoskeletal: Normal range of motion. Neurological: She is alert and oriented to person, place, and time. Skin: Skin is warm and dry. Psychiatric: She has a normal mood and affect. Her behavior is normal. Judgment and thought content normal.     /66 (Site: Left Upper Arm, Position: Sitting, Cuff Size: Medium Adult)   Pulse 68   Resp 18   Ht 5' 9.5\" (1.765 m)   Wt 168 lb 12.8 oz (76.6 kg)   LMP  (LMP Unknown)   BMI 24.57 kg/m²     Assessment:       Diagnosis Orders   1. Essential hypertension     2. Anxiety  LORazepam (ATIVAN) 1 MG tablet   3. Major depressive disorder, single episode, in partial remission ([facility] [address]     4. Primary insomnia  LORazepam (ATIVAN) 1 MG tablet   5. 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 Gastroparesis     8.  Irritable bowel syndrome with diarrhea  loperamide (IMODIUM) 2 MG capsule    Comprehensive Metabolic Panel             Plan:      Return in about 6 months (around 12/17/2019) for hypertension check.    Orders Placed This Encounter   Procedures    Comprehensive Metabolic Panel     Standing Status:   Future     Number of Occurrences:   1     Standing Expiration Date:   6/16/2020        Orders Placed This Encounter   Medications    loperamide (IMODIUM) 2 MG capsule     Sig: Take 1 capsule by mouth 4 times daily as needed for Diarrhea (TAKES WITH LOMOTIL)     Dispense:  120 capsule     Refill:  1    LORazepam (ATIVAN) 1 MG tablet     Sig: take 1 tablet by mouth every evening     Dispense:  30 tablet     Refill:  0      HTN-having issues with low BP, follow-up as planned with cardiology  Anxiety, depression, insomnia- coping fairly well with current increase in her GI issues. Continue current medications  GI disorders- advised increasing water intake with flushes 4-5 times a day 60 mL each follow-up as planned surgeon, check labs as some concern for dehydration  Of the 25 minute duration appointment visit, ENRIQUE Wood spent at least 50% of the face-to-face time in counseling, explanation of diagnosis, planning of further management, and answering all questions. Patient given educational materials - see patient instructions. Discussed use, benefit, and side effects of prescribed medications. All patientquestions answered. Pt voiced understanding. Reviewed health maintenance. Instructedto continue current medications, diet and exercise. Patient agreed with treatmentplan. Follow up as directed.      Electronicallysigned by TRAY Bedoya CNP on 6/17/2019 at 5:38 PM

## 2019-06-17 NOTE — PROGRESS NOTES
02/28/2021    Colon cancer screen colonoscopy  01/12/2022    Lipid screen  06/28/2023    DTaP/Tdap/Td vaccine (2 - Td) 04/01/2026    DEXA (modify frequency per FRAX score)  Completed    Flu vaccine  Completed    Pneumococcal 65+ years Vaccine  Completed    Hepatitis C screen  Completed

## 2019-06-19 ENCOUNTER — OFFICE VISIT (OUTPATIENT)
Dept: BARIATRICS/WEIGHT MGMT | Age: 69
End: 2019-06-19
Payer: MEDICARE

## 2019-06-19 VITALS
BODY MASS INDEX: 24.88 KG/M2 | WEIGHT: 168 LBS | SYSTOLIC BLOOD PRESSURE: 124 MMHG | DIASTOLIC BLOOD PRESSURE: 82 MMHG | RESPIRATION RATE: 20 BRPM | HEIGHT: 69 IN | HEART RATE: 70 BPM

## 2019-06-19 DIAGNOSIS — K31.6 GASTROCUTANEOUS FISTULA DUE TO GASTROSTOMY TUBE: Primary | ICD-10-CM

## 2019-06-19 PROCEDURE — 4040F PNEUMOC VAC/ADMIN/RCVD: CPT | Performed by: SURGERY

## 2019-06-19 PROCEDURE — G8420 CALC BMI NORM PARAMETERS: HCPCS | Performed by: SURGERY

## 2019-06-19 PROCEDURE — G8399 PT W/DXA RESULTS DOCUMENT: HCPCS | Performed by: SURGERY

## 2019-06-19 PROCEDURE — 99213 OFFICE O/P EST LOW 20 MIN: CPT | Performed by: SURGERY

## 2019-06-19 PROCEDURE — 3017F COLORECTAL CA SCREEN DOC REV: CPT | Performed by: SURGERY

## 2019-06-19 PROCEDURE — 1036F TOBACCO NON-USER: CPT | Performed by: SURGERY

## 2019-06-19 PROCEDURE — 1123F ACP DISCUSS/DSCN MKR DOCD: CPT | Performed by: SURGERY

## 2019-06-19 PROCEDURE — 1090F PRES/ABSN URINE INCON ASSESS: CPT | Performed by: SURGERY

## 2019-06-19 PROCEDURE — G8427 DOCREV CUR MEDS BY ELIG CLIN: HCPCS | Performed by: SURGERY

## 2019-06-19 NOTE — PROGRESS NOTES
Patient is here as a follow-up from her recent emergency department visit. She still has significant output from her previous gastrostomy tube site. She describes the output and says over the last couple weeks it has dramatically decreased. She had some recent lab work which did show an elevated BUN and creatinine but she is not drinking because of the high gastrocutaneous fistula. She continues with the J-tube feeds which are going okay. Physical Exam:  Vitals:    06/19/19 1256   BP: 124/82   Site: Right Upper Arm   Position: Sitting   Cuff Size: Medium Adult   Pulse: 70   Resp: 20   Weight: 168 lb (76.2 kg)   Height: 5' 9.49\" (1.765 m)     Constitutional:  Vital signs are normal. The patient appears well-developed and well-nourished. Abdominal: Soft. Skin around the gastrocutaneous fistula is inflamed. No abscess is appreciated. Musculoskeletal:        Right lower leg: Normal. No tenderness and no edema. Left lower leg: Normal. No tenderness and no edema. Lymphadenopathy:     No cervical adenopathy, No Exrtemity Adenopathy. Neurological: The patient is alert and oriented. Skin: Skin is warm, dry and intact. Psychiatric: The patient has a normal mood and affect. Speech is normal and behavior is normal. Judgment and thought content normal. Cognition and memory are normal.     Pertinent lab work was reviewed today and any deficiencies were discussed.     Assessment:  Patient Active Problem List   Diagnosis    Depression    Anxiety    Irritable bowel syndrome    Gastroparesis    Fibromyalgia    [facility]ID (common variable immunodeficiency) ([facility] [address]    Fatigue    Hypogammaglobulinemia (HCC)    Renal insufficiency    Osteopenia    History of stress fracture    Jejunostomy tube present (HCC)    Gastrointestinal tube present ([address]    H/O sepsis    Essential hypertension    Primary insomnia    Adverse drug effect    Dry mouth    Family history of other condition    Feeding difficulties

## 2019-06-20 ENCOUNTER — HOSPITAL ENCOUNTER (OUTPATIENT)
Dept: ONCOLOGY | Age: 69
Discharge: HOME | End: 2019-06-20
Attending: SURGERY | Admitting: SURGERY
Payer: MEDICARE

## 2019-06-20 VITALS
OXYGEN SATURATION: 100 % | TEMPERATURE: 97.3 F | DIASTOLIC BLOOD PRESSURE: 75 MMHG | SYSTOLIC BLOOD PRESSURE: 122 MMHG | HEART RATE: 79 BPM | RESPIRATION RATE: 18 BRPM

## 2019-06-20 PROBLEM — E86.0 DEHYDRATION: Status: ACTIVE | Noted: 2019-06-20

## 2019-06-20 PROCEDURE — 96361 HYDRATE IV INFUSION ADD-ON: CPT

## 2019-06-20 PROCEDURE — 2580000003 HC RX 258: Performed by: SURGERY

## 2019-06-20 PROCEDURE — 96360 HYDRATION IV INFUSION INIT: CPT

## 2019-06-20 RX ORDER — SODIUM CHLORIDE 9 MG/ML
INJECTION, SOLUTION INTRAVENOUS CONTINUOUS
Status: DISCONTINUED | OUTPATIENT
Start: 2019-06-20 | End: 2019-06-20 | Stop reason: HOSPADM

## 2019-06-20 RX ADMIN — SODIUM CHLORIDE 2000 ML: 9 INJECTION, SOLUTION INTRAVENOUS at 10:31

## 2019-06-21 ENCOUNTER — TELEPHONE (OUTPATIENT)
Dept: PRIMARY CARE CLINIC | Age: 69
End: 2019-06-21

## 2019-06-25 ENCOUNTER — HOSPITAL ENCOUNTER (OUTPATIENT)
Dept: ONCOLOGY | Age: 69
Discharge: HOME | End: 2019-06-25
Attending: SURGERY | Admitting: SURGERY
Payer: MEDICARE

## 2019-06-25 VITALS
RESPIRATION RATE: 16 BRPM | HEART RATE: 66 BPM | DIASTOLIC BLOOD PRESSURE: 74 MMHG | SYSTOLIC BLOOD PRESSURE: 144 MMHG | TEMPERATURE: 97.2 F

## 2019-06-25 DIAGNOSIS — E86.0 DEHYDRATION: Primary | ICD-10-CM

## 2019-06-25 PROCEDURE — 96361 HYDRATE IV INFUSION ADD-ON: CPT

## 2019-06-25 PROCEDURE — 96360 HYDRATION IV INFUSION INIT: CPT

## 2019-06-25 PROCEDURE — 2580000003 HC RX 258: Performed by: SURGERY

## 2019-06-25 RX ORDER — HEPARIN SODIUM (PORCINE) LOCK FLUSH IV SOLN 100 UNIT/ML 100 UNIT/ML
500 SOLUTION INTRAVENOUS PRN
Status: CANCELLED | OUTPATIENT
Start: 2019-06-25

## 2019-06-25 RX ORDER — 0.9 % SODIUM CHLORIDE 0.9 %
2000 INTRAVENOUS SOLUTION INTRAVENOUS ONCE
Status: COMPLETED | OUTPATIENT
Start: 2019-06-25 | End: 2019-06-25

## 2019-06-25 RX ORDER — SODIUM CHLORIDE 0.9 % (FLUSH) 0.9 %
10 SYRINGE (ML) INJECTION PRN
Status: CANCELLED | OUTPATIENT
Start: 2019-06-25

## 2019-06-25 RX ORDER — SODIUM CHLORIDE 0.9 % (FLUSH) 0.9 %
5 SYRINGE (ML) INJECTION PRN
Status: CANCELLED | OUTPATIENT
Start: 2019-06-25

## 2019-06-25 RX ORDER — 0.9 % SODIUM CHLORIDE 0.9 %
2000 INTRAVENOUS SOLUTION INTRAVENOUS ONCE
Status: CANCELLED | OUTPATIENT
Start: 2019-06-25

## 2019-06-25 RX ADMIN — SODIUM CHLORIDE 2000 ML: 9 INJECTION, SOLUTION INTRAVENOUS at 09:05

## 2019-06-26 ENCOUNTER — HOSPITAL ENCOUNTER (OUTPATIENT)
Dept: WOUND CARE | Age: 69
Discharge: HOME | End: 2019-06-26
Payer: MEDICARE

## 2019-06-26 VITALS
HEART RATE: 70 BPM | TEMPERATURE: 97.5 F | BODY MASS INDEX: 24.88 KG/M2 | SYSTOLIC BLOOD PRESSURE: 154 MMHG | DIASTOLIC BLOOD PRESSURE: 83 MMHG | WEIGHT: 168 LBS | HEIGHT: 69 IN | RESPIRATION RATE: 16 BRPM

## 2019-06-26 DIAGNOSIS — K58.0 IRRITABLE BOWEL SYNDROME WITH DIARRHEA: Chronic | ICD-10-CM

## 2019-06-26 DIAGNOSIS — K94.20 GASTROSTOMY COMPLICATION, UNSPECIFIED (HCC): Chronic | ICD-10-CM

## 2019-06-26 DIAGNOSIS — D83.9 CVID (COMMON VARIABLE IMMUNODEFICIENCY) (HCC): Chronic | ICD-10-CM

## 2019-06-26 DIAGNOSIS — T81.89XD POSTPROCEDURAL NON-HEALING WOUND, SUBSEQUENT ENCOUNTER: Chronic | ICD-10-CM

## 2019-06-26 DIAGNOSIS — K31.6 GASTROCUTANEOUS FISTULA DUE TO GASTROSTOMY TUBE: Primary | Chronic | ICD-10-CM

## 2019-06-26 DIAGNOSIS — K31.84 GASTROPARESIS: Chronic | ICD-10-CM

## 2019-06-26 DIAGNOSIS — Z93.4 JEJUNOSTOMY TUBE PRESENT (HCC): Chronic | ICD-10-CM

## 2019-06-26 PROBLEM — I10 ESSENTIAL HYPERTENSION: Chronic | Status: ACTIVE | Noted: 2017-04-20

## 2019-06-26 PROBLEM — Z93.1 GASTROINTESTINAL TUBE PRESENT (HCC): Chronic | Status: ACTIVE | Noted: 2017-02-07

## 2019-06-26 PROBLEM — T81.89XA POSTPROCEDURAL NON-HEALING WOUND: Chronic | Status: ACTIVE | Noted: 2019-06-26

## 2019-06-26 PROCEDURE — 99213 OFFICE O/P EST LOW 20 MIN: CPT

## 2019-06-26 RX ORDER — LIDOCAINE HYDROCHLORIDE 40 MG/ML
SOLUTION TOPICAL ONCE
Status: CANCELLED | OUTPATIENT
Start: 2019-06-26 | End: 2019-06-26

## 2019-06-26 RX ORDER — ZINC OXIDE 25 %
10 OINTMENT (GRAM) TOPICAL 2 TIMES DAILY
Qty: 480 G | Refills: 0 | Status: SHIPPED | OUTPATIENT
Start: 2019-06-26 | End: 2022-06-10

## 2019-06-26 ASSESSMENT — PAIN - FUNCTIONAL ASSESSMENT: PAIN_FUNCTIONAL_ASSESSMENT: ACTIVITIES ARE NOT PREVENTED

## 2019-06-26 ASSESSMENT — PAIN DESCRIPTION - ORIENTATION: ORIENTATION: MID

## 2019-06-26 ASSESSMENT — PAIN DESCRIPTION - ONSET: ONSET: ON-GOING

## 2019-06-26 ASSESSMENT — PAIN DESCRIPTION - PROGRESSION: CLINICAL_PROGRESSION: NOT CHANGED

## 2019-06-26 ASSESSMENT — PAIN SCALES - GENERAL: PAINLEVEL_OUTOF10: 1

## 2019-06-26 ASSESSMENT — PAIN DESCRIPTION - FREQUENCY: FREQUENCY: INTERMITTENT

## 2019-06-26 ASSESSMENT — PAIN DESCRIPTION - PAIN TYPE: TYPE: CHRONIC PAIN

## 2019-06-26 ASSESSMENT — PAIN DESCRIPTION - LOCATION: LOCATION: ABDOMEN

## 2019-06-26 ASSESSMENT — PAIN DESCRIPTION - DESCRIPTORS: DESCRIPTORS: ACHING

## 2019-06-26 NOTE — PROGRESS NOTES
[facility]   Progress Note and Procedure Note      Rena Enriquez  MEDICAL RECORD NUMBER:  [id number]  AGE: 80 y.o. GENDER: female  : 1950  EPISODE DATE:  2019    Subjective:     Chief Complaint   Patient presents with    Wound Check     abdomen         HISTORY of PRESENT ILLNESS HPI     Rena Enriquez is a 80 y.o. female who presents today for wound/ulcer evaluation.    History of Wound Context: left upper quadrant abdominal wound from gastrocutaneous fistula  Wound/Ulcer Pain Timing/Severity: constant  Quality of pain: burning  Severity:  3 / 10   Modifying Factors: Pain worsens with touching and gastric juices present  Associated Signs/Symptoms: edema and pain    Ulcer Identification:  Ulcer Type: non-healing surgical  Contributing Factors: obesity and immunosuppression    Wound: N/A        PAST MEDICAL HISTORY        Diagnosis Date    Abdominal pain     Anxiety     CVID (common variable immunodeficiency) ([address]     Depression     Diarrhea     Difficulty swallowing     Dizziness     Fibromyalgia     Gastroparesis     Headache     History of blood transfusion     Hypogammaglobulinaemia, unspecified     Irritable bowel syndrome     Jejunostomy tube present ([address]     USES TO FEED SELF , PEPTAMEN AF 1500 BESSY A DAY    Nausea & vomiting     Numbness     Postprocedural non-healing wound 2019    RLS (restless legs syndrome)     S/P percutaneous endoscopic gastrostomy (PEG) tube placement ([address]     USES TO DRAIN STOMACH    Sepsis ([address] 2006    SVT (supraventricular tachycardia) ([address]     ON RX    Wears glasses     Wears glasses        PAST SURGICAL HISTORY    Past Surgical History:   Procedure Laterality Date    ABSCESS DRAINAGE  2018     near peg tube- local    BREAST BIOPSY Left 8/13/15    BUNIONECTOMY Right 1985    FOOT    CHOLECYSTECTOMY  1972    ENDOMETRIAL ABLATION      GASTRIC FUNDOPLICATION  4638    sx that injured the vagus nerves and caused gastroparesis    GASTROSTOMY TUBE PLACEMENT  2004    for stomach drainage    ILEOSTOMY OR JEJUNOSTOMY  2005    J-tube for feeding    KNEE SURGERY Right 1986    tumor    ME OFFICE/OUTPT VISIT,PROCEDURE ONLY N/A 3/8/2018    INCISION AND DRAINAGE ABSCESS NEAR PEG TUBE performed by Michael Antoine IV, DO at [address]  2005    done to help gastroparesis fr vagus nerve injury   [address] [facility] TUNNELED VENOUS PORT PLACEMENT  2004    REMOVED 2 YRS LATER    UPPER GASTROINTESTINAL ENDOSCOPY  1993-2012    X 15 SOME WITH BX., SOME FOR DILATATION       FAMILY HISTORY    Family History   Problem Relation Age of Onset    Hypertension Mother     Heart Disease Paternal Grandfather     Stroke Father     Cancer Brother         KIDNEY AND PROSTATE    Colon Cancer Maternal Uncle     Cancer Maternal Grandmother         NON HODGINS LYMPHOMA    Cancer Maternal Grandfather         LUNG AND ESOPHAGEAL       SOCIAL HISTORY    Social History     Tobacco Use    Smoking status: Never Smoker    Smokeless tobacco: Never Used   Substance Use Topics    Alcohol use: No    Drug use: No       ALLERGIES    Allergies   Allergen Reactions    Macrodantin [Nitrofurantoin Macrocrystal] Hives    Compazine [Prochlorperazine] Other (See Comments)     HYPER    Phenergan [Promethazine Hcl] Other (See Comments)     HYPER    Reglan [Metoclopramide] Other (See Comments)     HYPER, AGGITATED      Sulfa Antibiotics Rash    Vancomycin Other (See Comments)     HALLUCINATON    Vfend [Voriconazole] Other (See Comments)     hallucinations       MEDICATIONS    Current Outpatient Medications on File Prior to Encounter   Medication Sig Dispense Refill    loperamide (IMODIUM) 2 MG capsule Take 1 capsule by mouth 4 times daily as needed for Diarrhea (TAKES WITH LOMOTIL) 120 capsule 1    LORazepam (ATIVAN) 1 MG tablet take 1 tablet by mouth every evening 30 tablet 0    cyanocobalamin 1000 MCG/ML injection inject 1 milliliter intramuscularly EVERY 1ST OF THE MONTH 10 mL 2    cholestyramine (QUESTRAN) 4 g packet Take 1 packet by mouth 2 times daily 90 packet 3    sertraline (ZOLOFT) 100 MG tablet TAKE 1 AND 1/2 TABLETS BY  MOUTH DAILY 135 tablet 3    clotrimazole-betamethasone (LOTRISONE) 1-0.05 % cream apply topically to affected area twice a day 1 Tube 0    ibuprofen (ADVIL;MOTRIN) 600 MG tablet TAKE 1 TABLET BY MOUTH  EVERY 8 HOURS AS NEEDED FOR PAIN 270 tablet 2    B-D 3CC LUER-JUSTIN SYR 21GX1\" 21G X 1\" 3 ML MISC USE AS DIRECTED WITH VITAMIN B EVERY MONTH 100 each 3    Elastic Bandages & Supports (MEDICAL COMPRESSION STOCKINGS) MISC Knee high 20 -30 mm hg 1 each 2    lactobacillus (BACID) TABS take 1 tablet by mouth three times a day 180 tablet 3    nystatin (MYCOSTATIN) 612426 UNIT/GM ointment Apply topically 2 times daily. 30 g 2    diltiazem (CARDIZEM) 60 MG tablet Take 90 mg by mouth 2 times daily       Nutritional Supplements (PEPTAMEN AF) LIQD Take by mouth Indications: TUBE FEED 1500 CALORIES A DAY      ondansetron (ZOFRAN) 8 MG tablet   Take 8 mg by mouth every 8 hours as needed       EPINEPHrine 0.3 MG/0.3ML SOAJ injection Inject 0.3 mg into the muscle.  Immune Globulin, Human, (HIZENTRA) 10 GM/50ML SOLN   Inject 10 g into the skin every 7 days THURSDAYS      Cholecalciferol (VITAMIN D-3) 5000 UNITS TABS Take by mouth daily       Diphenoxylate-Atropine (LOMOTIL PO) Take  by mouth as needed.  flecainide (TAMBOCOR) 50 MG tablet Take 50 mg by mouth daily.  dicyclomine (BENTYL) 20 MG tablet Take 20 mg by mouth every 6 hours as needed. No current facility-administered medications on file prior to encounter. REVIEW OF SYSTEMS    Pertinent items are noted in HPI.     Objective:      BP (!) 154/83   Pulse 70   Temp 97.5 °F (36.4 °C) (Tympanic)   Resp 16   Ht 5' 9\" (1.753 m)   Wt 168 lb (76.2 kg)   LMP  (LMP Unknown)   BMI 24.81 kg/m²     Wt Readings from Last 3 Encounters:   06/26/19 168 lb (76.2 kg)   06/19/19 168 lb (76.2 kg)   06/17/19 168 lb 12.8 oz (76.6 kg)       PHYSICAL EXAM    General Appearance: alert and oriented to person, place and time, well developed and well- nourished, in no acute distress  Skin: warm and dry, no rash or erythema  Head: normocephalic and atraumatic  Eyes: pupils equal, round, and reactive to light, extraocular eye movements intact, conjunctivae normal  Pulmonary/Chest: clear to auscultation bilaterally- no wheezes, rales or rhonchi, normal air movement, no respiratory distress  Cardiovascular: normal rate, regular rhythm, normal S1 and S2, no murmurs, rubs, clicks, or gallops, distal pulses intact, no carotid bruits  Abdomen: soft, non-tender, non-distended, normal bowel sounds, no masses or organomegaly  Extremities: no cyanosis, clubbing or edema  Musculoskeletal: normal range of motion, no joint swelling, deformity or tenderness  Neurologic: reflexes normal and symmetric, no cranial nerve deficit, gait, coordination and speech normal      Assessment:     Problem List Items Addressed This Visit     Irritable bowel syndrome (Chronic)    Gastroparesis (Chronic)    CVID (common variable immunodeficiency) (HCC) (Chronic)    Jejunostomy tube present (HCC) (Chronic)    Gastrocutaneous fistula due to gastrostomy tube - Primary (Chronic)    Postprocedural non-healing wound (Chronic)           Procedure Note  Indications:  Based on my examination of this patient's wound(s)/ulcer(s) today, debridement is not required to promote healing and evaluate the wound base.         Incision Breast Left (Active)   Number of days:        Incision 03/08/18 Abdomen (Active)   Number of days: 474       Wound 06/11/19 Abdomen Mid #1 zonia gastrostomy (Active)   Wound Image   6/11/2019  2:19 PM   Wound Other 6/26/2019 10:56 AM   Dressing Status Old drainage 6/26/2019 10:56 AM   Dressing Changed Changed/New 6/26/2019 10:56 AM   Wound Cleansed Rinsed/Irrigated with saline 6/26/2019 10:56 AM   Wound Length (cm) 0.2 cm 6/26/2019 10:56 AM   Wound Width (cm) 0.5 cm 6/26/2019 10:56 AM   Wound Depth (cm) 0.1 cm 6/26/2019 10:56 AM   Wound Surface Area (cm^2) 0.1 cm^2 6/26/2019 10:56 AM   Change in Wound Size % (l*w) 99.57 6/26/2019 10:56 AM   Wound Volume (cm^3) 0.01 cm^3 6/26/2019 10:56 AM   Wound Healing % 100 6/26/2019 10:56 AM   Post-Procedure Length (cm) 0.2 cm 6/26/2019 10:56 AM   Post-Procedure Width (cm) 5.8 cm 6/11/2019  2:19 PM   Post-Procedure Depth (cm) 0.1 cm 6/11/2019  2:19 PM   Post-Procedure Surface Area (cm^2) 23.2 cm^2 6/11/2019  2:19 PM   Post-Procedure Volume (cm^3) 2.32 cm^3 6/11/2019  2:19 PM   Wound Assessment Red 6/26/2019 10:56 AM   Drainage Amount Moderate 6/26/2019 10:56 AM   Drainage Description Yellow 6/26/2019 10:56 AM   Odor None 6/26/2019 10:56 AM   Margins Defined edges 6/26/2019 10:56 AM   Virginie-wound Assessment Red 6/26/2019 10:56 AM   Red%Wound Bed 100 6/26/2019 10:56 AM   Number of days: 14       Plan:     Treatment Note please see attached Discharge Instructions    Written patient dismissal instructions given to patient and signed by patient or POA. Discharge Instructions         [id number]                                 Visit  Discharge Instructions / Physician Orders  DATE: 06/26/19     Home Care:     SUPPLIES ORDERED THRU:     Wound Location:  Abdomen     Cleanse with:  Liquid antibacterial soap and water, rinse well                   Dressing Orders:  Duoderm around the wound cut opening tiny (enough for fluid to come out)Ostomy pouch to wound.    Please measure fluid out put and log bring to Dr. Frances Sarah appoint and wound care     Frequency: As needed as long as it stays sealed and is not leaking on skin                    Additional Orders: Increase protein to diet (meat, cheese, eggs, fish, peanut butter, nuts and beans)  Multivitamin daily     HYPERBARIC TREATMENT-                TREATMENT #     Your next appointment with [facility] is in 2 weeks     ROOM TYPE   [] CHAIR     [] BED   [] EITHER        TIME [] 45 MIN     [] 60 MIN     (Please note your next appointment above and if you are unable to keep, kindly give a 24 hour notice.  Thank you.)     If you experience any of the following, please call the [facility] during business hours:  [phone]     * Increase in Pain  * Temperature over 101  * Increase in drainage from your wound  * Drainage with a foul odor  * Bleeding  * Increase in swelling  * Need for compression bandage changes due to slippage, breakthrough drainage.     If you need medical attention outside of the [facility] hours [facility] please contact your PCP or go to the nearest emergency room.     AVS REVIEWED    YES []     Patient Signature:__________________________________________________Date:_______  Electronically signed by Stacia Webb RN on 6/26/2019 at 10:43 AM          Electronically signed by Gabbie Harkins MD on 6/26/2019 at 11:14 AM

## 2019-06-26 NOTE — DISCHARGE INSTR - COC
Continuity of Care Form    Patient Name: Madai Husain   :    MRN:  [id number]    Admit date:  2019  Discharge date:  ***    Code Status Order: Prior   Advance Directives:   [facility] Documentation     Date/Time Healthcare Directive Type of Healthcare Directive Copy in [address] Agent's Name Healthcare Agent's Phone Number    [phone]  Yes, patient has an advance directive for healthcare treatment  Durable power of  for health care;Living will  No, copy requested from other (See comment)  Spouse -- --          Admitting Physician:  No admitting provider for patient encounter. PCP: TRAY Lay CNP    Discharging Nurse: [facility] Unit/Room#: No information available for this encounter. Discharging Unit Phone Number: ***    Emergency Contact:   Extended Emergency Contact Information  Primary Emergency Contact: Gabriele New  Address: [address] [place] [address] Phone: [phone]  Relation: Spouse  Hearing or visual needs: None  Other needs: None  Preferred language: English   needed?  No  Secondary Emergency Contact: [address] of [address] [place] [address] Phone: [phone]  Relation: Parent    Past Surgical History:  Past Surgical History:   Procedure Laterality Date    ABSCESS DRAINAGE  2018     near peg tube- local    BREAST BIOPSY Left 8/13/15    BUNIONECTOMY Right [address]    ENDOMETRIAL ABLATION  2005    GASTRIC FUNDOPLICATION  9390    sx that injured the vagus nerves and caused gastroparesis    GASTROSTOMY TUBE PLACEMENT  2004    for stomach drainage    ILEOSTOMY OR JEJUNOSTOMY  2005    J-tube for feeding    KNEE SURGERY Right 1986    tumor    [place] OFFICE/OUTPT VISIT,PROCEDURE ONLY N/A 3/8/2018    INCISION AND DRAINAGE ABSCESS NEAR PEG TUBE performed by Ascencion Antoine IV, DO at [facility] OR    PYLOROPLASTY  2005    done to help gastroparesis fr vagus nerve injury   [facility] [address] TUNNELED VENOUS PORT PLACEMENT  2004    REMOVED 2 YRS LATER    UPPER GASTROINTESTINAL ENDOSCOPY  1993-2012    X 15 SOME WITH BX., SOME FOR DILATATION       Immunization History:   Immunization History   Administered Date(s) Administered    Influenza 11/01/2006    Influenza A (H5N1) Monovalent Vaccine, Adjuvanted-2013 09/25/2013, 10/05/2015, 10/10/2016    Influenza Virus Vaccine 10/01/2013, 10/17/2018    Influenza, Berenice Hole, 3 Years and older, IM (Fluzone 3 yrs and older or Afluria 5 yrs and older) 10/11/2016, 10/26/2017    Pneumococcal Conjugate 13-valent (Pejnyzn62) 10/10/2016    Pneumococcal Conjugate 7-valent (Prevnar7) 09/26/2013    Pneumococcal Polysaccharide ([id number]) 09/25/2013, 10/01/2018    Tdap (Boostrix, Adacel) 04/01/2016       Active Problems:  Patient Active Problem List   Diagnosis Code    Depression F32.9    Anxiety F41.9    Irritable bowel syndrome K58.9    Gastroparesis K31.84    Fibromyalgia M79.7    CVID (common variable immunodeficiency) ([facility]) D83.9    Fatigue R53.83    Hypogammaglobulinemia ([facility]) D80.1    Renal insufficiency N28.9    Osteopenia M85.80    History of stress fracture Z87.312    Jejunostomy tube present ([facility]) Z93.4    Gastrointestinal tube present ([facility]) Z93.1    H/O sepsis Z86.19    Essential hypertension I10    Primary insomnia F51.01    Adverse drug effect T50.905A    Dry mouth R68.2    Family history of other condition Z84.89    Feeding difficulties R63.3    Cellulitis L03.90    CVID (common variable immunodeficiency) ([facility]) D83.9    Hypogammaglobulinemia ([facility]) D80.1    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    Dehydration E86.0    Postprocedural non-healing wound T81.89XA    Gastrostomy complication, unspecified ([facility] [address] K94.20       Isolation/Infection:   Isolation          No Isolation            Nurse Assessment:  Last Vital Signs: BP (!) 154/83   Pulse 70   Temp 97.5 °F (36.4 °C) (Tympanic)   Resp 16   Ht 5' 9\" (1.753 m)   Wt 168 lb (76.2 kg)   LMP  (LMP Unknown)   BMI 24.81 kg/m²     Last documented pain score (0-10 scale): Pain Level: 1  Last Weight:   Wt Readings from Last 1 Encounters:   19 168 lb (76.2 kg)     Mental Status:  {IP PT MENTAL STATUS:98124}    IV Access:  { MADELINE IV ACCESS:775860156}    Nursing Mobility/ADLs:  Walking   {P DME KCY}  Transfer  {P DME HRTS:075293502}  Bathing  {P DME PPFL:106870581}  Dressing  {P DME KKHI:233925169}  Toileting  {P DME ZJES:713963889}  Feeding  {[facility] DME SOQJ:589477233}  Med Admin  {[facility] DME LQSY:433668497}  Med Delivery   { MADELINE MED Delivery:863494895}    Wound Care Documentation and Therapy:  Incision Breast Left (Active)   Number of days:        Incision 18 Abdomen (Active)   Number of days: 474       Wound 19 Abdomen Mid #1 zonia gastrostomy (Active)   Wound Image   2019  2:19 PM   Wound Other 2019 10:56 AM   Dressing Status Old drainage 2019 10:56 AM   Dressing Changed Changed/New 2019 10:56 AM   Wound Cleansed Rinsed/Irrigated with saline 2019 10:56 AM   Wound Length (cm) 0.2 cm 2019 10:56 AM   Wound Width (cm) 0.5 cm 2019 10:56 AM   Wound Depth (cm) 0.1 cm 2019 10:56 AM   Wound Surface Area (cm^2) 0.1 cm^2 2019 10:56 AM   Change in Wound Size % (l*w) 99.57 2019 10:56 AM   Wound Volume (cm^3) 0.01 cm^3 2019 10:56 AM   Wound Healing % 100 2019 10:56 AM   Post-Procedure Length (cm) 0.2 cm 2019 10:56 AM   Post-Procedure Width (cm) 5.8 cm 2019  2:19 PM   Post-Procedure Depth (cm) 0.1 cm 2019  2:19 PM   Post-Procedure Surface Area (cm^2) 23.2 cm^2 2019  2:19 PM   Post-Procedure Volume (cm^3) 2.32 cm^3 2019  2:19 PM   Wound Assessment Red 2019 10:56 AM   Drainage Amount Moderate 2019 10:56 AM   Drainage Description Yellow 2019 10:56 AM   Odor None 2019 10:56 AM   Margins Defined edges 2019 10:56 AM   Virginie-wound Assessment Red 2019 10:56 AM   Red%Wound Bed 100 2019 10:56 AM   Number of days: 14        Elimination:  Continence:   · Bowel: {YES / XN:53749}  · Bladder: {YES / JM:55261}  Urinary Catheter: {Urinary Catheter:604338007}   Colostomy/Ileostomy/Ileal Conduit: {YES / UZ:23492}       Date of Last BM: ***  No intake or output data in the 24 hours ending 19 1117  No intake/output data recorded.     Safety Concerns:     [address] [employer] Safety Concerns:140190160}    Impairments/Disabilities:      [address] [employer] Impairments/Disabilities:488131805}    Nutrition Therapy:  Current Nutrition Therapy:   [address] [employer] Diet List:520105494}    Routes of Feeding: {CHP DME Other Feedings:946504214}  Liquids: {Slp liquid thickness:20043}  Daily Fluid Restriction: {CHP DME Yes amt example:500899287}  Last Modified Barium Swallow with Video (Video Swallowing Test): {Done Not Done HKRZ:986054763}    Treatments at the Time of Hospital Discharge:   Respiratory Treatments: ***  Oxygen Therapy:  {Therapy; copd oxygen:10489}  Ventilator:    {MH CC Vent FMJH:835996744}    Rehab Therapies: {THERAPEUTIC INTERVENTION:4580019590}  Weight Bearing Status/Restrictions: [address]  Weight Bearin}  Other Medical Equipment (for information only, NOT a DME order):  {EQUIPMENT:994217906}  Other Treatments: ***    Patient's personal belongings (please select all that are sent with patient):  {CHP DME Belongings:875964428}    RN SIGNATURE:  {Esignature:536577864}    CASE MANAGEMENT/SOCIAL WORK SECTION    Inpatient Status Date: ***    Readmission Risk Assessment Score:  Readmission Risk              Risk of Unplanned Readmission:        0           Discharging to Facility/ Agency   · Name:   · Address:  · Phone:  · Fax:    Dialysis Facility (if applicable)   · Name:  · Address:  · Dialysis Schedule:  · Phone:  · Fax:    /

## 2019-06-28 ENCOUNTER — HOSPITAL ENCOUNTER (OUTPATIENT)
Dept: ONCOLOGY | Age: 69
Discharge: HOME | End: 2019-06-28
Attending: SURGERY | Admitting: SURGERY
Payer: MEDICARE

## 2019-06-28 VITALS
SYSTOLIC BLOOD PRESSURE: 117 MMHG | DIASTOLIC BLOOD PRESSURE: 74 MMHG | TEMPERATURE: 97 F | RESPIRATION RATE: 14 BRPM | HEART RATE: 72 BPM | OXYGEN SATURATION: 100 %

## 2019-06-28 DIAGNOSIS — E86.0 DEHYDRATION: Primary | ICD-10-CM

## 2019-06-28 PROCEDURE — 96361 HYDRATE IV INFUSION ADD-ON: CPT | Performed by: NURSE PRACTITIONER

## 2019-06-28 PROCEDURE — 96360 HYDRATION IV INFUSION INIT: CPT | Performed by: NURSE PRACTITIONER

## 2019-06-28 PROCEDURE — 2580000003 HC RX 258: Performed by: SURGERY

## 2019-06-28 RX ORDER — HEPARIN SODIUM (PORCINE) LOCK FLUSH IV SOLN 100 UNIT/ML 100 UNIT/ML
500 SOLUTION INTRAVENOUS PRN
Status: CANCELLED | OUTPATIENT
Start: 2019-06-28

## 2019-06-28 RX ORDER — SODIUM CHLORIDE 0.9 % (FLUSH) 0.9 %
10 SYRINGE (ML) INJECTION PRN
Status: DISCONTINUED | OUTPATIENT
Start: 2019-06-28 | End: 2019-06-28

## 2019-06-28 RX ORDER — SODIUM CHLORIDE 0.9 % (FLUSH) 0.9 %
5 SYRINGE (ML) INJECTION PRN
Status: DISCONTINUED | OUTPATIENT
Start: 2019-06-28 | End: 2019-06-28

## 2019-06-28 RX ORDER — SODIUM CHLORIDE 0.9 % (FLUSH) 0.9 %
10 SYRINGE (ML) INJECTION PRN
Status: CANCELLED | OUTPATIENT
Start: 2019-06-28

## 2019-06-28 RX ORDER — SODIUM CHLORIDE 0.9 % (FLUSH) 0.9 %
5 SYRINGE (ML) INJECTION PRN
Status: CANCELLED | OUTPATIENT
Start: 2019-06-28

## 2019-06-28 RX ORDER — 0.9 % SODIUM CHLORIDE 0.9 %
2000 INTRAVENOUS SOLUTION INTRAVENOUS ONCE
Status: COMPLETED | OUTPATIENT
Start: 2019-06-28 | End: 2019-06-28

## 2019-06-28 RX ORDER — HEPARIN SODIUM (PORCINE) LOCK FLUSH IV SOLN 100 UNIT/ML 100 UNIT/ML
500 SOLUTION INTRAVENOUS PRN
Status: DISCONTINUED | OUTPATIENT
Start: 2019-06-28 | End: 2019-06-28 | Stop reason: HOSPADM

## 2019-06-28 RX ADMIN — SODIUM CHLORIDE 2000 ML: 9 INJECTION, SOLUTION INTRAVENOUS at 08:23

## 2019-07-02 ENCOUNTER — HOSPITAL ENCOUNTER (OUTPATIENT)
Dept: ONCOLOGY | Age: 69
Discharge: HOME | End: 2019-07-02
Attending: SURGERY | Admitting: SURGERY
Payer: MEDICARE

## 2019-07-02 VITALS
SYSTOLIC BLOOD PRESSURE: 122 MMHG | RESPIRATION RATE: 18 BRPM | TEMPERATURE: 97.9 F | OXYGEN SATURATION: 99 % | HEART RATE: 84 BPM | DIASTOLIC BLOOD PRESSURE: 86 MMHG

## 2019-07-02 DIAGNOSIS — E86.0 DEHYDRATION: Primary | ICD-10-CM

## 2019-07-02 PROCEDURE — 96360 HYDRATION IV INFUSION INIT: CPT | Performed by: NURSE PRACTITIONER

## 2019-07-02 PROCEDURE — 96361 HYDRATE IV INFUSION ADD-ON: CPT | Performed by: NURSE PRACTITIONER

## 2019-07-02 PROCEDURE — 2580000003 HC RX 258: Performed by: SURGERY

## 2019-07-02 PROCEDURE — 76937 US GUIDE VASCULAR ACCESS: CPT

## 2019-07-02 RX ORDER — 0.9 % SODIUM CHLORIDE 0.9 %
2000 INTRAVENOUS SOLUTION INTRAVENOUS ONCE
Status: COMPLETED | OUTPATIENT
Start: 2019-07-02 | End: 2019-07-02

## 2019-07-02 RX ORDER — SODIUM CHLORIDE 9 MG/ML
INJECTION, SOLUTION INTRAVENOUS ONCE
Status: DISCONTINUED | OUTPATIENT
Start: 2019-07-02 | End: 2019-07-02 | Stop reason: SDUPTHER

## 2019-07-02 RX ORDER — 0.9 % SODIUM CHLORIDE 0.9 %
2000 INTRAVENOUS SOLUTION INTRAVENOUS ONCE
Status: CANCELLED
Start: 2019-07-04

## 2019-07-02 RX ORDER — 0.9 % SODIUM CHLORIDE 0.9 %
2000 INTRAVENOUS SOLUTION INTRAVENOUS ONCE
Status: CANCELLED
Start: 2019-07-02

## 2019-07-02 RX ADMIN — SODIUM CHLORIDE 2000 ML: 9 INJECTION, SOLUTION INTRAVENOUS at 09:27

## 2019-07-05 ENCOUNTER — HOSPITAL ENCOUNTER (OUTPATIENT)
Dept: ONCOLOGY | Age: 69
Discharge: HOME | End: 2019-07-05
Attending: SURGERY | Admitting: SURGERY
Payer: MEDICARE

## 2019-07-05 VITALS
TEMPERATURE: 97.5 F | RESPIRATION RATE: 16 BRPM | SYSTOLIC BLOOD PRESSURE: 138 MMHG | OXYGEN SATURATION: 97 % | DIASTOLIC BLOOD PRESSURE: 78 MMHG | HEART RATE: 64 BPM

## 2019-07-05 DIAGNOSIS — E86.0 DEHYDRATION: Primary | ICD-10-CM

## 2019-07-05 PROCEDURE — 2580000003 HC RX 258: Performed by: SURGERY

## 2019-07-05 PROCEDURE — 96361 HYDRATE IV INFUSION ADD-ON: CPT | Performed by: NURSE PRACTITIONER

## 2019-07-05 PROCEDURE — 96360 HYDRATION IV INFUSION INIT: CPT | Performed by: NURSE PRACTITIONER

## 2019-07-05 RX ORDER — 0.9 % SODIUM CHLORIDE 0.9 %
2000 INTRAVENOUS SOLUTION INTRAVENOUS ONCE
Status: CANCELLED
Start: 2019-07-07

## 2019-07-05 RX ORDER — 0.9 % SODIUM CHLORIDE 0.9 %
2000 INTRAVENOUS SOLUTION INTRAVENOUS ONCE
Status: COMPLETED | OUTPATIENT
Start: 2019-07-05 | End: 2019-07-05

## 2019-07-05 RX ADMIN — SODIUM CHLORIDE 2000 ML: 9 INJECTION, SOLUTION INTRAVENOUS at 08:18

## 2019-07-09 ENCOUNTER — HOSPITAL ENCOUNTER (OUTPATIENT)
Dept: ONCOLOGY | Age: 69
Discharge: HOME | End: 2019-07-09
Attending: SURGERY | Admitting: SURGERY
Payer: MEDICARE

## 2019-07-09 VITALS
HEART RATE: 64 BPM | RESPIRATION RATE: 18 BRPM | TEMPERATURE: 97.3 F | SYSTOLIC BLOOD PRESSURE: 132 MMHG | DIASTOLIC BLOOD PRESSURE: 78 MMHG | OXYGEN SATURATION: 100 %

## 2019-07-09 DIAGNOSIS — E86.0 DEHYDRATION: Primary | ICD-10-CM

## 2019-07-09 PROCEDURE — 96361 HYDRATE IV INFUSION ADD-ON: CPT

## 2019-07-09 PROCEDURE — 96360 HYDRATION IV INFUSION INIT: CPT

## 2019-07-09 PROCEDURE — 2580000003 HC RX 258: Performed by: SURGERY

## 2019-07-09 RX ORDER — 0.9 % SODIUM CHLORIDE 0.9 %
2000 INTRAVENOUS SOLUTION INTRAVENOUS ONCE
Status: DISCONTINUED | OUTPATIENT
Start: 2019-07-09 | End: 2019-07-12 | Stop reason: HOSPADM

## 2019-07-09 RX ORDER — 0.9 % SODIUM CHLORIDE 0.9 %
2000 INTRAVENOUS SOLUTION INTRAVENOUS ONCE
Status: COMPLETED | OUTPATIENT
Start: 2019-07-09 | End: 2019-07-09

## 2019-07-09 RX ORDER — 0.9 % SODIUM CHLORIDE 0.9 %
2000 INTRAVENOUS SOLUTION INTRAVENOUS ONCE
Status: CANCELLED
Start: 2019-07-11

## 2019-07-09 RX ADMIN — SODIUM CHLORIDE 2000 ML: 9 INJECTION, SOLUTION INTRAVENOUS at 09:00

## 2019-07-10 ENCOUNTER — HOSPITAL ENCOUNTER (OUTPATIENT)
Dept: WOUND CARE | Age: 69
Discharge: HOME | End: 2019-07-10
Payer: MEDICARE

## 2019-07-10 VITALS
HEIGHT: 69 IN | DIASTOLIC BLOOD PRESSURE: 79 MMHG | BODY MASS INDEX: 25.18 KG/M2 | RESPIRATION RATE: 18 BRPM | SYSTOLIC BLOOD PRESSURE: 153 MMHG | WEIGHT: 170 LBS | HEART RATE: 77 BPM | TEMPERATURE: 97.1 F

## 2019-07-10 PROCEDURE — 99212 OFFICE O/P EST SF 10 MIN: CPT

## 2019-07-10 PROCEDURE — 6370000000 HC RX 637 (ALT 250 FOR IP): Performed by: SURGERY

## 2019-07-10 RX ORDER — LIDOCAINE HYDROCHLORIDE 40 MG/ML
SOLUTION TOPICAL ONCE
Status: COMPLETED | OUTPATIENT
Start: 2019-07-10 | End: 2019-07-10

## 2019-07-10 RX ADMIN — LIDOCAINE HYDROCHLORIDE 5 ML: 40 SOLUTION TOPICAL at 11:06

## 2019-07-10 ASSESSMENT — PAIN SCALES - GENERAL: PAINLEVEL_OUTOF10: 0

## 2019-07-10 NOTE — PROGRESS NOTES
[address] [facility]  2005    GASTRIC FUNDOPLICATION  2601    sx that injured the vagus nerves and caused gastroparesis    GASTROSTOMY TUBE PLACEMENT  2004    for stomach drainage    ILEOSTOMY OR JEJUNOSTOMY  2005    J-tube for feeding    KNEE SURGERY Right 1986    tumor    [place] OFFICE/OUTPT VISIT,PROCEDURE ONLY N/A 3/8/2018    INCISION AND DRAINAGE ABSCESS NEAR PEG TUBE performed by Brendon Antoine IV, DO at [address]  2005    done to help gastroparesis fr vagus nerve injury   [facility]  [address] TUNNELED VENOUS PORT PLACEMENT  2004    REMOVED 2 YRS LATER    UPPER GASTROINTESTINAL ENDOSCOPY  1993-2012    X 15 SOME WITH BX., SOME FOR DILATATION       FAMILY HISTORY    Family History   Problem Relation Age of Onset    Hypertension Mother     Heart Disease Paternal Grandfather     Stroke Father     Cancer Brother         KIDNEY AND PROSTATE    Colon Cancer Maternal Uncle     Cancer Maternal Grandmother         NON HODGINS LYMPHOMA    Cancer Maternal Grandfather         LUNG AND ESOPHAGEAL       SOCIAL HISTORY    Social History     Tobacco Use    Smoking status: Never Smoker    Smokeless tobacco: Never Used   Substance Use Topics    Alcohol use: No    Drug use: No       ALLERGIES    Allergies   Allergen Reactions    Macrodantin [Nitrofurantoin Macrocrystal] Hives    Compazine [Prochlorperazine] Other (See Comments)     HYPER    Phenergan [Promethazine Hcl] Other (See Comments)     HYPER    Reglan [Metoclopramide] Other (See Comments)     HYPER, AGGITATED      Sulfa Antibiotics Rash    Vancomycin Other (See Comments)     HALLUCINATON    Vfend [Voriconazole] Other (See Comments)     hallucinations       MEDICATIONS    Current Outpatient Medications on File Prior to Encounter   Medication Sig Dispense Refill    ZINC OXIDE, TOPICAL, 25 % OINT Apply 10 mLs topically 2 times daily 480 g 0    loperamide (IMODIUM) 2 MG capsule Take 1 capsule by mouth 4 times daily as needed for Diarrhea (TAKES WITH LOMOTIL) 120 capsule 1    LORazepam (ATIVAN) 1 MG tablet take 1 tablet by mouth every evening 30 tablet 0    cyanocobalamin 1000 MCG/ML injection inject 1 milliliter intramuscularly EVERY 1ST OF THE MONTH 10 mL 2    cholestyramine (QUESTRAN) 4 g packet Take 1 packet by mouth 2 times daily 90 packet 3    sertraline (ZOLOFT) 100 MG tablet TAKE 1 AND 1/2 TABLETS BY  MOUTH DAILY 135 tablet 3    clotrimazole-betamethasone (LOTRISONE) 1-0.05 % cream apply topically to affected area twice a day 1 Tube 0    ibuprofen (ADVIL;MOTRIN) 600 MG tablet TAKE 1 TABLET BY MOUTH  EVERY 8 HOURS AS NEEDED FOR PAIN 270 tablet 2    B-D 3CC LUER-JUSTIN SYR 21GX1\" 21G X 1\" 3 ML MISC USE AS DIRECTED WITH VITAMIN B EVERY MONTH 100 each 3    Elastic Bandages & Supports (MEDICAL COMPRESSION STOCKINGS) MISC Knee high 20 -30 mm hg 1 each 2    lactobacillus (BACID) TABS take 1 tablet by mouth three times a day 180 tablet 3    nystatin (MYCOSTATIN) 478670 UNIT/GM ointment Apply topically 2 times daily. 30 g 2    diltiazem (CARDIZEM) 60 MG tablet Take 90 mg by mouth 2 times daily       Nutritional Supplements (PEPTAMEN AF) LIQD Take by mouth Indications: TUBE FEED 1500 CALORIES A DAY      ondansetron (ZOFRAN) 8 MG tablet   Take 8 mg by mouth every 8 hours as needed       EPINEPHrine 0.3 MG/0.3ML SOAJ injection Inject 0.3 mg into the muscle.  Immune Globulin, Human, (HIZENTRA) 10 GM/50ML SOLN   Inject 10 g into the skin every 7 days THURSDAYS      Cholecalciferol (VITAMIN D-3) 5000 UNITS TABS Take by mouth daily       Diphenoxylate-Atropine (LOMOTIL PO) Take  by mouth as needed.  flecainide (TAMBOCOR) 50 MG tablet Take 50 mg by mouth daily.  dicyclomine (BENTYL) 20 MG tablet Take 20 mg by mouth every 6 hours as needed. No current facility-administered medications on file prior to encounter. REVIEW OF SYSTEMS    Pertinent items are noted in HPI.     Objective: BP (!) 153/79   Pulse 77   Temp 97.1 °F (36.2 °C) (Tympanic)   Resp 18   Ht 5' 9\" (1.753 m)   Wt 170 lb (77.1 kg)   LMP  (LMP Unknown)   BMI 25.10 kg/m²     Wt Readings from Last 3 Encounters:   07/10/19 170 lb (77.1 kg)   06/26/19 168 lb (76.2 kg)   06/19/19 168 lb (76.2 kg)       PHYSICAL EXAM    General Appearance: alert and oriented to person, place and time, well developed and well- nourished, in no acute distress  Skin: warm and dry, no rash or erythema  Head: normocephalic and atraumatic  Neck: supple and non-tender without mass  Pulmonary/Chest: clear to auscultation bilaterally-   Cardiovascular: normal rate, regular rhythm  Abdomen: soft, non-tender, non-distended, normal bowel sounds, no masses or organomegaly, jejunostomy tube in place, open gastrocutaneous fistula with surrounding skin excoriation  Extremities: no cyanosis, clubbing or edema  Musculoskeletal: normal range of motion, no joint swelling, deformity or tenderness  Neurologic: no cranial nerve deficit, gait, coordination and speech normal      Assessment:        Problem List Items Addressed This Visit     None           Procedure Note  Indications:  Based on my examination of this patient's wound(s)/ulcer(s) today, debridement is not required to promote healing and evaluate the wound base. Incision Breast Left (Active)   Number of days:        Incision 03/08/18 Abdomen (Active)   Number of days: 488       Wound 06/11/19 Abdomen Mid #1 zonia gastrostomy (Active)   Wound Image   7/10/2019 10:54 AM   Wound Other 7/10/2019 10:54 AM   Dressing Status New drainage; Old drainage; Changed 7/10/2019 10:54 AM   Dressing Changed Changed/New 7/10/2019 10:54 AM   Wound Cleansed Rinsed/Irrigated with saline 6/26/2019 10:56 AM   Wound Length (cm) 0.1 cm 7/10/2019 10:54 AM   Wound Width (cm) 0.4 cm 7/10/2019 10:54 AM   Wound Depth (cm) 0.1 cm 7/10/2019 10:54 AM   Wound Surface Area (cm^2) 0.04 cm^2 7/10/2019 10:54 AM   Change in Wound Size % (l*w)

## 2019-07-12 ENCOUNTER — HOSPITAL ENCOUNTER (OUTPATIENT)
Dept: ONCOLOGY | Age: 69
Discharge: HOME | End: 2019-07-12
Attending: SURGERY | Admitting: SURGERY
Payer: MEDICARE

## 2019-07-12 VITALS
OXYGEN SATURATION: 96 % | RESPIRATION RATE: 18 BRPM | SYSTOLIC BLOOD PRESSURE: 121 MMHG | HEART RATE: 67 BPM | TEMPERATURE: 97.5 F | DIASTOLIC BLOOD PRESSURE: 75 MMHG

## 2019-07-12 DIAGNOSIS — E86.0 DEHYDRATION: Primary | ICD-10-CM

## 2019-07-12 PROCEDURE — 2580000003 HC RX 258: Performed by: SURGERY

## 2019-07-12 PROCEDURE — 76937 US GUIDE VASCULAR ACCESS: CPT

## 2019-07-12 RX ORDER — 0.9 % SODIUM CHLORIDE 0.9 %
2000 INTRAVENOUS SOLUTION INTRAVENOUS ONCE
Status: CANCELLED
Start: 2019-07-14

## 2019-07-12 RX ORDER — 0.9 % SODIUM CHLORIDE 0.9 %
2000 INTRAVENOUS SOLUTION INTRAVENOUS ONCE
Status: COMPLETED | OUTPATIENT
Start: 2019-07-12 | End: 2019-07-12

## 2019-07-12 RX ADMIN — SODIUM CHLORIDE 2000 ML: 9 INJECTION, SOLUTION INTRAVENOUS at 08:51

## 2019-07-17 ENCOUNTER — OFFICE VISIT (OUTPATIENT)
Dept: BARIATRICS/WEIGHT MGMT | Age: 69
End: 2019-07-17
Payer: MEDICARE

## 2019-07-17 VITALS
WEIGHT: 170 LBS | SYSTOLIC BLOOD PRESSURE: 124 MMHG | DIASTOLIC BLOOD PRESSURE: 82 MMHG | HEART RATE: 64 BPM | BODY MASS INDEX: 25.18 KG/M2 | RESPIRATION RATE: 20 BRPM | HEIGHT: 69 IN

## 2019-07-17 DIAGNOSIS — K31.6 GASTROCUTANEOUS FISTULA DUE TO GASTROSTOMY TUBE: Primary | ICD-10-CM

## 2019-07-17 PROCEDURE — G8419 CALC BMI OUT NRM PARAM NOF/U: HCPCS | Performed by: SURGERY

## 2019-07-17 PROCEDURE — 3017F COLORECTAL CA SCREEN DOC REV: CPT | Performed by: SURGERY

## 2019-07-17 PROCEDURE — 1090F PRES/ABSN URINE INCON ASSESS: CPT | Performed by: SURGERY

## 2019-07-17 PROCEDURE — 1123F ACP DISCUSS/DSCN MKR DOCD: CPT | Performed by: SURGERY

## 2019-07-17 PROCEDURE — 99213 OFFICE O/P EST LOW 20 MIN: CPT | Performed by: SURGERY

## 2019-07-17 PROCEDURE — G8427 DOCREV CUR MEDS BY ELIG CLIN: HCPCS | Performed by: SURGERY

## 2019-07-17 PROCEDURE — 4040F PNEUMOC VAC/ADMIN/RCVD: CPT | Performed by: SURGERY

## 2019-07-17 PROCEDURE — G8399 PT W/DXA RESULTS DOCUMENT: HCPCS | Performed by: SURGERY

## 2019-07-17 PROCEDURE — 1036F TOBACCO NON-USER: CPT | Performed by: SURGERY

## 2019-07-17 RX ORDER — OCTREOTIDE ACETATE 50 UG/ML
50 INJECTION, SOLUTION INTRAVENOUS; SUBCUTANEOUS EVERY 12 HOURS
Qty: 90 ML | Refills: 3 | Status: SHIPPED | OUTPATIENT
Start: 2019-07-17 | End: 2020-01-23

## 2019-07-17 RX ORDER — 0.9 % SODIUM CHLORIDE 0.9 %
2000 INTRAVENOUS SOLUTION INTRAVENOUS ONCE
Status: CANCELLED | OUTPATIENT
Start: 2019-07-22

## 2019-07-17 RX ORDER — 0.9 % SODIUM CHLORIDE 0.9 %
2000 INTRAVENOUS SOLUTION INTRAVENOUS ONCE
Status: CANCELLED | OUTPATIENT
Start: 2019-07-17

## 2019-07-17 NOTE — PROGRESS NOTES
Patient is here to check on her gastrocutaneous fistula site. She did bring a sheet recording her output and it has been gradually decreasing over the last 2 weeks. She is trying to minimize her oral intake and is using her jejunostomy tube for some nutrition. She has been getting regular IV fluids which she reports is helping her a lot. She is being [facility] care center    Physical Exam:  Vitals:    07/17/19 1318   BP: 124/82   Site: Left Upper Arm   Position: Sitting   Cuff Size: Large Adult   Pulse: 64   Resp: 20   Weight: 170 lb (77.1 kg)   Height: 5' 9.02\" (1.753 m)     Constitutional:  Vital signs are normal. The patient appears well-developed and well-nourished. Abdominal: Soft. No tenderness. there is still some excoriation of the skin around the site from the acid. Musculoskeletal:        Right lower leg: Normal. No tenderness and no edema. Left lower leg: Normal. No tenderness and no edema. Lymphadenopathy:     No cervical adenopathy, No Exrtemity Adenopathy. Neurological: The patient is alert and oriented. Skin: Skin is warm, dry and intact. Psychiatric: The patient has a normal mood and affect. Speech is normal and behavior is normal. Judgment and thought content normal. Cognition and memory are normal.     Pertinent lab work was reviewed today and any deficiencies were discussed.     Assessment:  Patient Active Problem List   Diagnosis    Depression    Anxiety    Irritable bowel syndrome    Gastroparesis    Fibromyalgia    CVID (common variable immunodeficiency) ([address]    Fatigue    Hypogammaglobulinemia (HCC)    Renal insufficiency    Osteopenia    History of stress fracture    Jejunostomy tube present (HCC)    Gastrointestinal tube present ([address]    H/O sepsis    Essential hypertension    Primary insomnia    Adverse drug effect    Dry mouth    Family history of other condition    Feeding difficulties    Cellulitis    CVID (common variable

## 2019-07-22 ENCOUNTER — TELEPHONE (OUTPATIENT)
Dept: BARIATRICS/WEIGHT MGMT | Age: 69
End: 2019-07-22

## 2019-07-22 RX ORDER — 0.9 % SODIUM CHLORIDE 0.9 %
2000 INTRAVENOUS SOLUTION INTRAVENOUS ONCE
Status: CANCELLED | OUTPATIENT
Start: 2019-07-23

## 2019-07-22 RX ORDER — 0.9 % SODIUM CHLORIDE 0.9 %
2000 INTRAVENOUS SOLUTION INTRAVENOUS ONCE
Status: DISCONTINUED | OUTPATIENT
Start: 2019-07-23 | End: 2019-07-22

## 2019-07-23 ENCOUNTER — HOSPITAL ENCOUNTER (OUTPATIENT)
Dept: ONCOLOGY | Age: 69
Discharge: HOME | End: 2019-07-23
Attending: SURGERY | Admitting: SURGERY
Payer: MEDICARE

## 2019-07-23 VITALS
TEMPERATURE: 98.1 F | DIASTOLIC BLOOD PRESSURE: 78 MMHG | RESPIRATION RATE: 16 BRPM | HEART RATE: 63 BPM | OXYGEN SATURATION: 98 % | SYSTOLIC BLOOD PRESSURE: 128 MMHG

## 2019-07-23 PROCEDURE — 76937 US GUIDE VASCULAR ACCESS: CPT

## 2019-07-23 PROCEDURE — 2580000003 HC RX 258: Performed by: SURGERY

## 2019-07-23 PROCEDURE — 96360 HYDRATION IV INFUSION INIT: CPT

## 2019-07-23 PROCEDURE — 96361 HYDRATE IV INFUSION ADD-ON: CPT

## 2019-07-23 RX ORDER — 0.9 % SODIUM CHLORIDE 0.9 %
2000 INTRAVENOUS SOLUTION INTRAVENOUS ONCE
Status: COMPLETED | OUTPATIENT
Start: 2019-07-23 | End: 2019-07-23

## 2019-07-23 RX ADMIN — SODIUM CHLORIDE 2000 ML: 9 INJECTION, SOLUTION INTRAVENOUS at 11:07

## 2019-07-24 ENCOUNTER — TELEPHONE (OUTPATIENT)
Dept: PRIMARY CARE CLINIC | Age: 69
End: 2019-07-24

## 2019-07-24 ENCOUNTER — HOSPITAL ENCOUNTER (OUTPATIENT)
Dept: WOUND CARE | Age: 69
Discharge: HOME | End: 2019-07-24
Payer: MEDICARE

## 2019-07-24 VITALS
HEIGHT: 69 IN | BODY MASS INDEX: 25.18 KG/M2 | SYSTOLIC BLOOD PRESSURE: 143 MMHG | TEMPERATURE: 97.4 F | RESPIRATION RATE: 18 BRPM | WEIGHT: 170 LBS | DIASTOLIC BLOOD PRESSURE: 83 MMHG | HEART RATE: 76 BPM

## 2019-07-24 PROCEDURE — 99212 OFFICE O/P EST SF 10 MIN: CPT

## 2019-07-24 PROCEDURE — 6370000000 HC RX 637 (ALT 250 FOR IP): Performed by: SURGERY

## 2019-07-24 RX ORDER — LIDOCAINE HYDROCHLORIDE 40 MG/ML
SOLUTION TOPICAL ONCE
Status: COMPLETED | OUTPATIENT
Start: 2019-07-24 | End: 2019-07-24

## 2019-07-24 RX ADMIN — LIDOCAINE HYDROCHLORIDE 5 ML: 40 SOLUTION TOPICAL at 09:55

## 2019-07-24 ASSESSMENT — PAIN DESCRIPTION - LOCATION: LOCATION: ABDOMEN

## 2019-07-24 ASSESSMENT — PAIN DESCRIPTION - PAIN TYPE: TYPE: CHRONIC PAIN

## 2019-07-24 ASSESSMENT — PAIN DESCRIPTION - DESCRIPTORS: DESCRIPTORS: ACHING

## 2019-07-24 ASSESSMENT — PAIN DESCRIPTION - ORIENTATION: ORIENTATION: MID

## 2019-07-24 ASSESSMENT — PAIN DESCRIPTION - ONSET: ONSET: ON-GOING

## 2019-07-24 ASSESSMENT — PAIN DESCRIPTION - PROGRESSION: CLINICAL_PROGRESSION: NOT CHANGED

## 2019-07-24 ASSESSMENT — PAIN SCALES - GENERAL: PAINLEVEL_OUTOF10: 0

## 2019-07-24 ASSESSMENT — PAIN DESCRIPTION - FREQUENCY: FREQUENCY: INTERMITTENT

## 2019-07-24 NOTE — PROGRESS NOTES
03/08/2018     near peg tube- local    BREAST BIOPSY Left 8/13/15    BUNIONECTOMY Right [address]    ENDOMETRIAL ABLATION  2005    GASTRIC FUNDOPLICATION  1407    sx that injured the vagus nerves and caused gastroparesis    GASTROSTOMY TUBE PLACEMENT  2004    for stomach drainage    ILEOSTOMY OR JEJUNOSTOMY  2005    J-tube for feeding    KNEE SURGERY Right 1986    tumor    [place] OFFICE/OUTPT VISIT,PROCEDURE ONLY N/A 3/8/2018    INCISION AND DRAINAGE ABSCESS NEAR PEG TUBE performed by Chalo Antoine IV, DO at [address]  2005    done to help gastroparesis fr vagus nerve injury   [address] [facility],  [address] TUNNELED VENOUS PORT PLACEMENT  2004    REMOVED 2 YRS LATER    UPPER GASTROINTESTINAL ENDOSCOPY  1993-2012    X 15 SOME WITH BX., SOME FOR DILATATION       FAMILY HISTORY    Family History   Problem Relation Age of Onset    Hypertension Mother     Heart Disease Paternal Grandfather     Stroke Father     Cancer Brother         KIDNEY AND PROSTATE    Colon Cancer Maternal Uncle     Cancer Maternal Grandmother         NON HODGINS LYMPHOMA    Cancer Maternal Grandfather         LUNG AND ESOPHAGEAL       SOCIAL HISTORY    Social History     Tobacco Use    Smoking status: Never Smoker    Smokeless tobacco: Never Used   Substance Use Topics    Alcohol use: No    Drug use: No       ALLERGIES    Allergies   Allergen Reactions    Macrodantin [Nitrofurantoin Macrocrystal] Hives    Compazine [Prochlorperazine] Other (See Comments)     HYPER    Phenergan [Promethazine Hcl] Other (See Comments)     HYPER    Reglan [Metoclopramide] Other (See Comments)     HYPER, AGGITATED      Sulfa Antibiotics Rash    Vancomycin Other (See Comments)     HALLUCINATON    Vfend [Voriconazole] Other (See Comments)     hallucinations       MEDICATIONS    Current Outpatient Medications on File Prior to Encounter   Medication Sig Dispense Refill    octreotide (SANDOSTATIN) 50 MCG/ML SOLN Inject 1 mL into the skin every 12 hours 90 mL 3    ZINC OXIDE, TOPICAL, 25 % OINT Apply 10 mLs topically 2 times daily 480 g 0    loperamide (IMODIUM) 2 MG capsule Take 1 capsule by mouth 4 times daily as needed for Diarrhea (TAKES WITH LOMOTIL) 120 capsule 1    cyanocobalamin 1000 MCG/ML injection inject 1 milliliter intramuscularly EVERY 1ST OF THE MONTH 10 mL 2    cholestyramine (QUESTRAN) 4 g packet Take 1 packet by mouth 2 times daily 90 packet 3    sertraline (ZOLOFT) 100 MG tablet TAKE 1 AND 1/2 TABLETS BY  MOUTH DAILY 135 tablet 3    clotrimazole-betamethasone (LOTRISONE) 1-0.05 % cream apply topically to affected area twice a day 1 Tube 0    ibuprofen (ADVIL;MOTRIN) 600 MG tablet TAKE 1 TABLET BY MOUTH  EVERY 8 HOURS AS NEEDED FOR PAIN 270 tablet 2    B-D 3CC LUER-JUSTIN SYR 21GX1\" 21G X 1\" 3 ML MISC USE AS DIRECTED WITH VITAMIN B EVERY MONTH 100 each 3    Elastic Bandages & Supports (MEDICAL COMPRESSION STOCKINGS) MISC Knee high 20 -30 mm hg 1 each 2    lactobacillus (BACID) TABS take 1 tablet by mouth three times a day 180 tablet 3    nystatin (MYCOSTATIN) 550468 UNIT/GM ointment Apply topically 2 times daily. 30 g 2    diltiazem (CARDIZEM) 60 MG tablet Take 90 mg by mouth 2 times daily       Nutritional Supplements (PEPTAMEN AF) LIQD Take by mouth Indications: TUBE FEED 1500 CALORIES A DAY      ondansetron (ZOFRAN) 8 MG tablet   Take 8 mg by mouth every 8 hours as needed       EPINEPHrine 0.3 MG/0.3ML SOAJ injection Inject 0.3 mg into the muscle.  Immune Globulin, Human, (HIZENTRA) 10 GM/50ML SOLN   Inject 10 g into the skin every 7 days THURSDAYS      Cholecalciferol (VITAMIN D-3) 5000 UNITS TABS Take by mouth daily       Diphenoxylate-Atropine (LOMOTIL PO) Take  by mouth as needed.  flecainide (TAMBOCOR) 50 MG tablet Take 50 mg by mouth daily.  dicyclomine (BENTYL) 20 MG tablet Take 20 mg by mouth every 6 hours as needed.        No current facility-administered medications on file prior to encounter. REVIEW OF SYSTEMS    Pertinent items are noted in HPI. Objective:      BP (!) 143/83   Pulse 76   Temp 97.4 °F (36.3 °C) (Tympanic)   Resp 18   Ht 5' 9\" (1.753 m)   Wt 170 lb (77.1 kg)   LMP  (LMP Unknown)   BMI 25.10 kg/m²     Wt Readings from Last 3 Encounters:   07/24/19 170 lb (77.1 kg)   07/17/19 170 lb (77.1 kg)   07/10/19 170 lb (77.1 kg)       PHYSICAL EXAM    General Appearance: alert and oriented to person, place and time, well-developed and well-nourished, in no acute distress  Skin: warm and dry, small area of skin excoriation around fistula      Assessment:        Problem List Items Addressed This Visit     None           Procedure Note  Indications:  Based on my examination of this patient's wound(s)/ulcer(s) today, debridement is not required to promote healing and evaluate the wound base.         Incision Breast Left (Active)   Number of days:        Incision 03/08/18 Abdomen (Active)   Number of days: 502       Wound 06/11/19 Abdomen Mid #1 zonia gastrostomy (Active)   Wound Image   7/10/2019 10:54 AM   Wound Other 7/24/2019  9:53 AM   Dressing Status New drainage 7/24/2019  9:53 AM   Dressing Changed Changed/New 7/24/2019  9:53 AM   Wound Cleansed Rinsed/Irrigated with saline 6/26/2019 10:56 AM   Wound Length (cm) 0.3 cm 7/24/2019  9:53 AM   Wound Width (cm) 0.4 cm 7/24/2019  9:53 AM   Wound Depth (cm) 0.2 cm 7/24/2019  9:53 AM   Wound Surface Area (cm^2) 0.12 cm^2 7/24/2019  9:53 AM   Change in Wound Size % (l*w) 99.48 7/24/2019  9:53 AM   Wound Volume (cm^3) 0.02 cm^3 7/24/2019  9:53 AM   Wound Healing % 99 7/24/2019  9:53 AM   Post-Procedure Length (cm) 1.6 cm 7/24/2019  9:53 AM   Post-Procedure Width (cm) 2 cm 7/24/2019  9:53 AM   Post-Procedure Depth (cm) 1 cm 7/24/2019  9:53 AM   Post-Procedure Surface Area (cm^2) 3.2 cm^2 7/24/2019  9:53 AM   Post-Procedure Volume (cm^3) 3.2 cm^3 7/24/2019  9:53 AM   Wound

## 2019-07-24 NOTE — LETTER
[facility]   [address] [address]  P: [phone] F: [phone]      07/24/19      [address] [place] [address]      Our records are showing that you are due for your Colon Cancer Screening. We now have more options available for you to complete this requirement. A colonoscopy is the most effective screening method. Your primary care physician will   place an order for a referral to a Gastroenterologist of your choice. You will need to call   their office to schedule your colonoscopy. They will forward the results to our office, and   we will call you with the results. If any further treatment is needed, we will explain at   that point. Colonoscopies are repeated every 10 years. Another option is a Cologuard test. To complete this your primary care physician will   place an order, that we will then forward to Cologuard. Their staff will contact you to   set everything up. They will ship their kit to you and you can complete the test in the   comfort of your own home. All of the necessary supplies and directions will be included  in your kit. You will then mail the kit back to them, with the included return label. They   will forward the results back to our office, and we will call you with the results. If any   further treatment is needed, we will explain at that time. Cologuard tests are repeated every 3 years. Please call our office with your choice of the screening, [phone].       Sincerely,    TRAY Arreola CNP

## 2019-07-24 NOTE — DISCHARGE INSTR - COC
Assessment:  Last Vital Signs: BP (!) 143/83   Pulse 76   Temp 97.4 °F (36.3 °C) (Tympanic)   Resp 18   Ht 5' 9\" (1.753 m)   Wt 170 lb (77.1 kg)   LMP  (LMP Unknown)   BMI 25.10 kg/m²     Last documented pain score (0-10 scale): Pain Level: 0  Last Weight:   Wt Readings from Last 1 Encounters:   07/24/19 170 lb (77.1 kg)     Mental Status:  {IP PT MENTAL STATUS:20647}    IV Access:  { MADELINE IV ACCESS:872075730}    Nursing Mobility/ADLs:  Walking   {P DME SEFP:349199997}  Transfer  {[facility] DME RHZA:288179520}  Bathing  {P DME GLBW:958627269}  Dressing  {P DME VUAF:570789308}  Toileting  {P DME NJUC:865945960}  Feeding  {[facility] DME LPIT:397086212}  Med Admin  {[facility] DME GUBO:152871711}  Med Delivery   {[facility] MED Delivery:173461007}    Wound Care Documentation and Therapy:  Incision Breast Left (Active)   Number of days:        Incision 03/08/18 Abdomen (Active)   Number of days: 502       Wound 06/11/19 Abdomen Mid #1 zonia gastrostomy (Active)   Wound Image   7/10/2019 10:54 AM   Wound Other 7/24/2019  9:53 AM   Dressing Status New drainage 7/24/2019  9:53 AM   Dressing Changed Changed/New 7/24/2019  9:53 AM   Wound Cleansed Rinsed/Irrigated with saline 6/26/2019 10:56 AM   Wound Length (cm) 0.3 cm 7/24/2019  9:53 AM   Wound Width (cm) 0.4 cm 7/24/2019  9:53 AM   Wound Depth (cm) 0.2 cm 7/24/2019  9:53 AM   Wound Surface Area (cm^2) 0.12 cm^2 7/24/2019  9:53 AM   Change in Wound Size % (l*w) 99.48 7/24/2019  9:53 AM   Wound Volume (cm^3) 0.02 cm^3 7/24/2019  9:53 AM   Wound Healing % 99 7/24/2019  9:53 AM   Post-Procedure Length (cm) 1.6 cm 7/24/2019  9:53 AM   Post-Procedure Width (cm) 2 cm 7/24/2019  9:53 AM   Post-Procedure Depth (cm) 1 cm 7/24/2019  9:53 AM   Post-Procedure Surface Area (cm^2) 3.2 cm^2 7/24/2019  9:53 AM   Post-Procedure Volume (cm^3) 3.2 cm^3 7/24/2019  9:53 AM   Wound Assessment Red 7/24/2019  9:53 AM   Drainage Amount Large 7/24/2019  9:53 AM   Drainage Description Yellow 7/24/2019 9:53 AM   Odor None 2019  9:53 AM   Margins Defined edges 2019  9:53 AM   Virginie-wound Assessment Excoriated; Red 2019  9:53 AM   Red%Wound Bed 100 2019  9:53 AM   Number of days: 42        Elimination:  Continence:   · Bowel: {YES / QY:09991}  · Bladder: {YES / OR:86988}  Urinary Catheter: {Urinary Catheter:552359533}   Colostomy/Ileostomy/Ileal Conduit: {YES / IC:03720}       Date of Last BM: ***  No intake or output data in the 24 hours ending 19 1012  No intake/output data recorded.     Safety Concerns:     [employer] Safety Concerns:733735410}    Impairments/Disabilities:      [employer] Impairments/Disabilities:353365364}    Nutrition Therapy:  Current Nutrition Therapy:   [employer] Diet List:633496633}    Routes of Feeding: {CHP DME Other Feedings:807765511}  Liquids: {Slp liquid thickness:39180}  Daily Fluid Restriction: {CHP DME Yes amt example:403002987}  Last Modified Barium Swallow with Video (Video Swallowing Test): {Done Not Done ZXIE:370938961}    Treatments at the Time of Hospital Discharge:   Respiratory Treatments: ***  Oxygen Therapy:  {Therapy; copd oxygen:48581}  Ventilator:    {MH CC Vent TLXQ:401700155}    Rehab Therapies: {THERAPEUTIC INTERVENTION:6939686480}  Weight Bearing Status/Restrictions: [employer]  Weight Bearin}  Other Medical Equipment (for information only, NOT a DME order):  {EQUIPMENT:588371277}  Other Treatments: ***    Patient's personal belongings (please select all that are sent with patient):  {CHP DME Belongings:174829954}    RN SIGNATURE:  {Esignature:876882429}    CASE MANAGEMENT/SOCIAL WORK SECTION    Inpatient Status Date: ***    Readmission Risk Assessment Score:  Readmission Risk              Risk of Unplanned Readmission:        0           Discharging to Facility/ Agency   · Name:   · Address:  · Phone:  · Fax:    Dialysis Facility (if applicable)   · Name:  · Address:  · Dialysis Schedule:  · Phone:  · Fax:    /

## 2019-08-05 ENCOUNTER — HOSPITAL ENCOUNTER (OUTPATIENT)
Dept: ONCOLOGY | Age: 69
Discharge: HOME | End: 2019-08-05
Attending: SURGERY | Admitting: SURGERY
Payer: MEDICARE

## 2019-08-05 VITALS
SYSTOLIC BLOOD PRESSURE: 136 MMHG | DIASTOLIC BLOOD PRESSURE: 84 MMHG | HEART RATE: 71 BPM | OXYGEN SATURATION: 99 % | RESPIRATION RATE: 18 BRPM

## 2019-08-05 DIAGNOSIS — E86.0 DEHYDRATION: Primary | ICD-10-CM

## 2019-08-05 PROBLEM — M32.9 LUPUS (HCC): Status: ACTIVE | Noted: 2019-08-05

## 2019-08-05 PROCEDURE — 96360 HYDRATION IV INFUSION INIT: CPT

## 2019-08-05 PROCEDURE — 96361 HYDRATE IV INFUSION ADD-ON: CPT

## 2019-08-05 PROCEDURE — 2580000003 HC RX 258: Performed by: SURGERY

## 2019-08-05 RX ORDER — 0.9 % SODIUM CHLORIDE 0.9 %
2000 INTRAVENOUS SOLUTION INTRAVENOUS ONCE
Status: COMPLETED | OUTPATIENT
Start: 2019-08-05 | End: 2019-08-05

## 2019-08-05 RX ORDER — 0.9 % SODIUM CHLORIDE 0.9 %
2000 INTRAVENOUS SOLUTION INTRAVENOUS ONCE
Status: CANCELLED | OUTPATIENT
Start: 2019-08-12

## 2019-08-05 RX ADMIN — SODIUM CHLORIDE 2000 ML: 9 INJECTION, SOLUTION INTRAVENOUS at 09:39

## 2019-08-05 ASSESSMENT — PAIN SCALES - GENERAL: PAINLEVEL_OUTOF10: 0

## 2019-08-05 NOTE — DISCHARGE INSTR - COC
2013, 10/05/2015, 10/10/2016    Influenza Virus Vaccine 10/01/2013, 10/17/2018    Influenza, Patrick Baker, 3 Years and older, IM (Fluzone 3 yrs and older or Afluria 5 yrs and older) 10/11/2016, 10/26/2017    Pneumococcal Conjugate 13-valent (Yhazlau08) 10/10/2016    Pneumococcal Conjugate 7-valent (Prevnar7) 2013    Pneumococcal Polysaccharide (Ejzpssiuh32) 2013, 10/01/2018    Tdap (Boostrix, Adacel) 2016       Active Problems:  Patient Active Problem List   Diagnosis Code    Depression F32.9    Anxiety F41.9    Irritable bowel syndrome K58.9    Gastroparesis K31.84    Fibromyalgia M79.7    CVID (common variable immunodeficiency) ([facility]) D83.9    Fatigue R53.83    Hypogammaglobulinemia ([facility]) D80.1    Renal insufficiency N28.9    Osteopenia M85.80    History of stress fracture Z87.312    Jejunostomy tube present ([facility]) Z93.4    Gastrointestinal tube present ([facility]) Z93.1    H/O sepsis Z86.19    Essential hypertension I10    Primary insomnia F51.01    Adverse drug effect T50.905A    Dry mouth R68.2    Family history of other condition Z84.89    Feeding difficulties R63.3    Cellulitis L03.90    CVID (common variable immunodeficiency) ([facility]) D83.9    Hypogammaglobulinemia ([facility]) D80.1    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    Dehydration E86.0    Postprocedural non-healing wound T81.89XA    Gastrostomy complication, unspecified ([facility] K94.20       Isolation/Infection:   Isolation          No Isolation            Nurse Assessment:  Last Vital Signs: /84   Pulse 71   Resp 18   LMP  (LMP Unknown)   SpO2 99%     Last documented pain score (0-10 scale): Pain Level: 0  Last Weight:   Wt Readings from Last 1 Encounters:   19 170 lb (77.1 kg)     Mental Status:  {IP PT MENTAL STATUS:}    IV Access:  {[facility] IV ACCESS:925375304}    Nursing Mobility/ADLs:  Walking   {P DME FWE}  Transfer  {P DME POVH:007056332}  Bathing  {P DME [address] [facility] Impairments/Disabilities:572658184}    Nutrition Therapy:  Current Nutrition Therapy:   [address] [facility] Diet List:510667432}    Routes of Feeding: {CHP DME Other Feedings:072895778}  Liquids: {Slp liquid thickness:51519}  Daily Fluid Restriction: {CHP DME Yes amt example:140415303}  Last Modified Barium Swallow with Video (Video Swallowing Test): {Done Not Done CWNK:539183977}    Treatments at the Time of Hospital Discharge:   Respiratory Treatments: ***  Oxygen Therapy:  {Therapy; copd oxygen:06695}  Ventilator:    { CC Vent TFSM:670823940}    Rehab Therapies: {THERAPEUTIC INTERVENTION:7719167927}  Weight Bearing Status/Restrictions: [address]  Weight Bearin}  Other Medical Equipment (for information only, NOT a DME order):  {EQUIPMENT:149910190}  Other Treatments: ***    Patient's personal belongings (please select all that are sent with patient):  {[facility] DME Belongings:695208509}    RN SIGNATURE:  {Esignature:334600451}    CASE MANAGEMENT/SOCIAL WORK SECTION    Inpatient Status Date: ***    Readmission Risk Assessment Score:  Readmission Risk              Risk of Unplanned Readmission:        5           Discharging to Facility/ Agency   · Name:   · Address:  · Phone:  · Fax:    Dialysis Facility (if applicable)   · Name:  · Address:  · Dialysis Schedule:  · Phone:  · Fax:    / signature: {Esignature:652205324}    PHYSICIAN SECTION    Prognosis: {Prognosis:9586111586}    Condition at Discharge: [address] Patient Condition:077403763}    Rehab Potential (if transferring to Rehab): {Prognosis:6505619000}    Recommended Labs or Other Treatments After Discharge: ***    Physician Certification: I certify the above information and transfer of Kaela Lester  is necessary for the continuing treatment of the diagnosis listed and that she requires {Admit to Appropriate Level of Care:19147} for {GREATER/LESS:226305949} 30 days.      Update Admission H&P: {CHP DME Changes in WKELIZABETHYN:676677231}    PHYSICIAN SIGNATURE:  {Esignature:710603504}

## 2019-08-07 ENCOUNTER — HOSPITAL ENCOUNTER (OUTPATIENT)
Dept: WOUND CARE | Age: 69
Discharge: HOME | End: 2019-08-07
Payer: MEDICARE

## 2019-08-07 ENCOUNTER — TELEPHONE (OUTPATIENT)
Dept: BARIATRICS/WEIGHT MGMT | Age: 69
End: 2019-08-07

## 2019-08-07 VITALS
HEART RATE: 69 BPM | SYSTOLIC BLOOD PRESSURE: 135 MMHG | BODY MASS INDEX: 25.18 KG/M2 | DIASTOLIC BLOOD PRESSURE: 83 MMHG | WEIGHT: 170 LBS | HEIGHT: 69 IN | RESPIRATION RATE: 18 BRPM | TEMPERATURE: 98 F

## 2019-08-07 PROCEDURE — 99212 OFFICE O/P EST SF 10 MIN: CPT

## 2019-08-07 RX ORDER — LIDOCAINE HYDROCHLORIDE 40 MG/ML
SOLUTION TOPICAL ONCE
Status: DISCONTINUED | OUTPATIENT
Start: 2019-08-07 | End: 2019-08-08 | Stop reason: HOSPADM

## 2019-08-07 ASSESSMENT — PAIN SCALES - GENERAL: PAINLEVEL_OUTOF10: 0

## 2019-08-07 NOTE — PROGRESS NOTES
Post-Procedure Surface Area (cm^2) 3.2 cm^2 7/24/2019  9:53 AM   Post-Procedure Volume (cm^3) 3.2 cm^3 7/24/2019  9:53 AM   Wound Assessment Red 8/7/2019  9:28 AM   Drainage Amount Large 8/7/2019  9:28 AM   Drainage Description Yellow 8/7/2019  9:28 AM   Odor None 8/7/2019  9:28 AM   Margins Defined edges 7/24/2019  9:53 AM   Virginie-wound Assessment Excoriated;Red 8/7/2019  9:28 AM   Red%Wound Bed 100 8/7/2019  9:28 AM   Number of days: 56          Percent of Wound(s)/Ulcer(s) Debrided: 0%    Total Surface Area Debrided:  0 sq cm            Plan:     Treatment Note please see attached Discharge Instructions    Written patient dismissal instructions given to patient and signed by patient or POA. Discharge Instructions         [address] [facility]                                 Visit Landry Instructions / Physician Orders  DATE: 8/7/19     Home Care:     SUPPLIES ORDERED THRU:     Wound Location:  Abdomen     Cleanse with:  Liquid antibacterial soap and water, rinse well     Dressing Orders:  Stoma powder and skin prep to periwound. Form Eakins seal to fit around wound and apply ostomy bag     Frequency: As needed, as long as it stays sealed and is not leaking on skin     Additional Orders: Increase protein to diet (meat, cheese, eggs, fish, peanut butter, nuts and beans)  Multivitamin daily  Keep Appointment with Dr. Mj Dye-                JFKHECDWT #     Your next appointment with [address] [place] [address] is in 4 weeks     ROOM TYPE   [] CHAIR     [] BED   [] EITHER        TIME [] 45 MIN     [] 60 MIN     (Please note your next appointment above and if you are unable to keep, kindly give a 24 hour notice.  Thank you.)     If you experience any of the following, please call the [address] [place] [address] during business hours:  [phone]     * Increase in Pain  * Temperature over 101  * Increase in drainage from your wound  * Drainage with a foul odor  *

## 2019-08-12 ENCOUNTER — PATIENT MESSAGE (OUTPATIENT)
Dept: BARIATRICS/WEIGHT MGMT | Age: 69
End: 2019-08-12

## 2019-08-12 ENCOUNTER — HOSPITAL ENCOUNTER (OUTPATIENT)
Dept: ONCOLOGY | Age: 69
Discharge: HOME | End: 2019-08-12
Attending: SURGERY | Admitting: SURGERY
Payer: MEDICARE

## 2019-08-12 VITALS
OXYGEN SATURATION: 100 % | HEART RATE: 71 BPM | DIASTOLIC BLOOD PRESSURE: 69 MMHG | RESPIRATION RATE: 16 BRPM | TEMPERATURE: 98 F | SYSTOLIC BLOOD PRESSURE: 121 MMHG

## 2019-08-12 DIAGNOSIS — E86.0 DEHYDRATION: ICD-10-CM

## 2019-08-12 PROCEDURE — 96360 HYDRATION IV INFUSION INIT: CPT | Performed by: NURSE PRACTITIONER

## 2019-08-12 PROCEDURE — 96361 HYDRATE IV INFUSION ADD-ON: CPT | Performed by: NURSE PRACTITIONER

## 2019-08-12 PROCEDURE — 2580000003 HC RX 258: Performed by: SURGERY

## 2019-08-12 RX ORDER — 0.9 % SODIUM CHLORIDE 0.9 %
2000 INTRAVENOUS SOLUTION INTRAVENOUS ONCE
Status: COMPLETED | OUTPATIENT
Start: 2019-08-12 | End: 2019-08-12

## 2019-08-12 RX ADMIN — SODIUM CHLORIDE 2000 ML: 9 INJECTION, SOLUTION INTRAVENOUS at 09:21

## 2019-08-14 DIAGNOSIS — F51.01 PRIMARY INSOMNIA: ICD-10-CM

## 2019-08-14 DIAGNOSIS — F41.9 ANXIETY: ICD-10-CM

## 2019-08-14 RX ORDER — LORAZEPAM 1 MG/1
TABLET ORAL
Qty: 30 TABLET | Refills: 0 | Status: SHIPPED | OUTPATIENT
Start: 2019-08-14 | End: 2019-09-14

## 2019-08-15 ENCOUNTER — HOSPITAL ENCOUNTER (OUTPATIENT)
Dept: CT IMAGING | Age: 69
Discharge: HOME | End: 2019-08-17
Payer: MEDICARE

## 2019-08-15 ENCOUNTER — HOSPITAL ENCOUNTER (OUTPATIENT)
Age: 69
Discharge: HOME | End: 2019-08-15
Payer: MEDICARE

## 2019-08-15 DIAGNOSIS — K31.84 GASTROPARESIS: ICD-10-CM

## 2019-08-15 LAB
ALT SERPL-CCNC: 13 U/L (ref 5–33)
AST SERPL-CCNC: 23 U/L
CHOLESTEROL, FASTING: 197 MG/DL
CHOLESTEROL/HDL RATIO: 3.2
CREAT SERPL-MCNC: 0.96 MG/DL (ref 0.5–0.9)
GFR AFRICAN AMERICAN: >60 ML/MIN
GFR NON-AFRICAN AMERICAN: 58 ML/MIN
GFR SERPL CREATININE-BSD FRML MDRD: ABNORMAL ML/MIN/{1.73_M2}
GFR SERPL CREATININE-BSD FRML MDRD: ABNORMAL ML/MIN/{1.73_M2}
HDLC SERPL-MCNC: 62 MG/DL
LDL CHOLESTEROL: 107 MG/DL (ref 0–130)
TRIGLYCERIDE, FASTING: 140 MG/DL
VLDLC SERPL CALC-MCNC: NORMAL MG/DL (ref 1–30)

## 2019-08-15 PROCEDURE — 80061 LIPID PANEL: CPT

## 2019-08-15 PROCEDURE — 6360000004 HC RX CONTRAST MEDICATION: Performed by: INTERNAL MEDICINE

## 2019-08-15 PROCEDURE — 36415 COLL VENOUS BLD VENIPUNCTURE: CPT

## 2019-08-15 PROCEDURE — 2580000003 HC RX 258: Performed by: INTERNAL MEDICINE

## 2019-08-15 PROCEDURE — 74160 CT ABDOMEN W/CONTRAST: CPT

## 2019-08-15 PROCEDURE — 84460 ALANINE AMINO (ALT) (SGPT): CPT

## 2019-08-15 PROCEDURE — 84450 TRANSFERASE (AST) (SGOT): CPT

## 2019-08-15 PROCEDURE — 82565 ASSAY OF CREATININE: CPT

## 2019-08-15 RX ORDER — 0.9 % SODIUM CHLORIDE 0.9 %
80 INTRAVENOUS SOLUTION INTRAVENOUS ONCE
Status: COMPLETED | OUTPATIENT
Start: 2019-08-15 | End: 2019-08-15

## 2019-08-15 RX ORDER — SODIUM CHLORIDE 0.9 % (FLUSH) 0.9 %
10 SYRINGE (ML) INJECTION
Status: COMPLETED | OUTPATIENT
Start: 2019-08-15 | End: 2019-08-15

## 2019-08-15 RX ADMIN — IOPAMIDOL 75 ML: 755 INJECTION, SOLUTION INTRAVENOUS at 14:04

## 2019-08-15 RX ADMIN — IOHEXOL 30 ML: 300 INJECTION, SOLUTION INTRAVENOUS at 14:05

## 2019-08-15 RX ADMIN — Medication 10 ML: at 14:05

## 2019-08-15 RX ADMIN — SODIUM CHLORIDE 80 ML: 9 INJECTION, SOLUTION INTRAVENOUS at 14:05

## 2019-08-16 ENCOUNTER — OFFICE VISIT (OUTPATIENT)
Dept: BARIATRICS/WEIGHT MGMT | Age: 69
End: 2019-08-16
Payer: MEDICARE

## 2019-08-16 VITALS
HEIGHT: 69 IN | DIASTOLIC BLOOD PRESSURE: 72 MMHG | HEART RATE: 72 BPM | WEIGHT: 172 LBS | RESPIRATION RATE: 20 BRPM | BODY MASS INDEX: 25.48 KG/M2 | SYSTOLIC BLOOD PRESSURE: 114 MMHG

## 2019-08-16 DIAGNOSIS — K31.6 GASTROCUTANEOUS FISTULA DUE TO GASTROSTOMY TUBE: Primary | ICD-10-CM

## 2019-08-16 PROCEDURE — G8419 CALC BMI OUT NRM PARAM NOF/U: HCPCS | Performed by: SURGERY

## 2019-08-16 PROCEDURE — 1036F TOBACCO NON-USER: CPT | Performed by: SURGERY

## 2019-08-16 PROCEDURE — G8399 PT W/DXA RESULTS DOCUMENT: HCPCS | Performed by: SURGERY

## 2019-08-16 PROCEDURE — 4040F PNEUMOC VAC/ADMIN/RCVD: CPT | Performed by: SURGERY

## 2019-08-16 PROCEDURE — 1090F PRES/ABSN URINE INCON ASSESS: CPT | Performed by: SURGERY

## 2019-08-16 PROCEDURE — 3017F COLORECTAL CA SCREEN DOC REV: CPT | Performed by: SURGERY

## 2019-08-16 PROCEDURE — 99213 OFFICE O/P EST LOW 20 MIN: CPT | Performed by: SURGERY

## 2019-08-16 PROCEDURE — G8427 DOCREV CUR MEDS BY ELIG CLIN: HCPCS | Performed by: SURGERY

## 2019-08-16 PROCEDURE — 1123F ACP DISCUSS/DSCN MKR DOCD: CPT | Performed by: SURGERY

## 2019-08-16 RX ORDER — OMEPRAZOLE 40 MG/1
CAPSULE, DELAYED RELEASE ORAL
Refills: 0 | COMMUNITY
Start: 2019-08-09 | End: 2020-01-23

## 2019-08-19 ENCOUNTER — HOSPITAL ENCOUNTER (OUTPATIENT)
Age: 69
Setting detail: OUTPATIENT SURGERY
Discharge: HOME | End: 2019-08-19
Attending: SURGERY | Admitting: SURGERY
Payer: MEDICARE

## 2019-08-19 ENCOUNTER — ANESTHESIA (OUTPATIENT)
Dept: OPERATING ROOM | Age: 69
End: 2019-08-19
Payer: MEDICARE

## 2019-08-19 ENCOUNTER — ANESTHESIA EVENT (OUTPATIENT)
Dept: OPERATING ROOM | Age: 69
End: 2019-08-19
Payer: MEDICARE

## 2019-08-19 VITALS
RESPIRATION RATE: 16 BRPM | TEMPERATURE: 97.9 F | HEART RATE: 68 BPM | OXYGEN SATURATION: 97 % | BODY MASS INDEX: 24.34 KG/M2 | DIASTOLIC BLOOD PRESSURE: 88 MMHG | WEIGHT: 170 LBS | SYSTOLIC BLOOD PRESSURE: 153 MMHG | HEIGHT: 70 IN

## 2019-08-19 VITALS — SYSTOLIC BLOOD PRESSURE: 140 MMHG | DIASTOLIC BLOOD PRESSURE: 76 MMHG | OXYGEN SATURATION: 96 %

## 2019-08-19 PROCEDURE — 6370000000 HC RX 637 (ALT 250 FOR IP): Performed by: ANESTHESIOLOGY

## 2019-08-19 PROCEDURE — 2709999900 HC NON-CHARGEABLE SUPPLY: Performed by: SURGERY

## 2019-08-19 PROCEDURE — 6360000002 HC RX W HCPCS: Performed by: NURSE ANESTHETIST, CERTIFIED REGISTERED

## 2019-08-19 PROCEDURE — 3600000014 HC SURGERY LEVEL 4 ADDTL 15MIN: Performed by: SURGERY

## 2019-08-19 PROCEDURE — 3700000000 HC ANESTHESIA ATTENDED CARE: Performed by: SURGERY

## 2019-08-19 PROCEDURE — 7100000040 HC SPAR PHASE II RECOVERY - FIRST 15 MIN: Performed by: SURGERY

## 2019-08-19 PROCEDURE — 3600000004 HC SURGERY LEVEL 4 BASE: Performed by: SURGERY

## 2019-08-19 PROCEDURE — 2580000003 HC RX 258: Performed by: ANESTHESIOLOGY

## 2019-08-19 PROCEDURE — 2500000003 HC RX 250 WO HCPCS: Performed by: NURSE ANESTHETIST, CERTIFIED REGISTERED

## 2019-08-19 PROCEDURE — 7100000041 HC SPAR PHASE II RECOVERY - ADDTL 15 MIN: Performed by: SURGERY

## 2019-08-19 PROCEDURE — 43246 EGD PLACE GASTROSTOMY TUBE: CPT | Performed by: SURGERY

## 2019-08-19 PROCEDURE — 3700000001 HC ADD 15 MINUTES (ANESTHESIA): Performed by: SURGERY

## 2019-08-19 PROCEDURE — 6360000002 HC RX W HCPCS: Performed by: SURGERY

## 2019-08-19 RX ORDER — MIDAZOLAM HYDROCHLORIDE 1 MG/ML
1 INJECTION INTRAMUSCULAR; INTRAVENOUS EVERY 5 MIN PRN
Status: COMPLETED | OUTPATIENT
Start: 2019-08-19 | End: 2019-08-19

## 2019-08-19 RX ORDER — SCOLOPAMINE TRANSDERMAL SYSTEM 1 MG/1
1 PATCH, EXTENDED RELEASE TRANSDERMAL
Status: DISCONTINUED | OUTPATIENT
Start: 2019-08-19 | End: 2019-08-19 | Stop reason: HOSPADM

## 2019-08-19 RX ORDER — FENTANYL CITRATE 50 UG/ML
50 INJECTION, SOLUTION INTRAMUSCULAR; INTRAVENOUS EVERY 5 MIN PRN
Status: COMPLETED | OUTPATIENT
Start: 2019-08-19 | End: 2019-08-19

## 2019-08-19 RX ORDER — LIDOCAINE HYDROCHLORIDE 10 MG/ML
INJECTION, SOLUTION EPIDURAL; INFILTRATION; INTRACAUDAL; PERINEURAL PRN
Status: DISCONTINUED | OUTPATIENT
Start: 2019-08-19 | End: 2019-08-19 | Stop reason: SDUPTHER

## 2019-08-19 RX ORDER — PROPOFOL 10 MG/ML
INJECTION, EMULSION INTRAVENOUS PRN
Status: DISCONTINUED | OUTPATIENT
Start: 2019-08-19 | End: 2019-08-19 | Stop reason: SDUPTHER

## 2019-08-19 RX ORDER — SODIUM CHLORIDE, SODIUM LACTATE, POTASSIUM CHLORIDE, CALCIUM CHLORIDE 600; 310; 30; 20 MG/100ML; MG/100ML; MG/100ML; MG/100ML
INJECTION, SOLUTION INTRAVENOUS CONTINUOUS
Status: DISCONTINUED | OUTPATIENT
Start: 2019-08-19 | End: 2019-08-19 | Stop reason: HOSPADM

## 2019-08-19 RX ORDER — ONDANSETRON 2 MG/ML
4 INJECTION INTRAMUSCULAR; INTRAVENOUS EVERY 6 HOURS PRN
Status: DISCONTINUED | OUTPATIENT
Start: 2019-08-19 | End: 2019-08-19 | Stop reason: HOSPADM

## 2019-08-19 RX ORDER — MIDAZOLAM HYDROCHLORIDE 1 MG/ML
2 INJECTION INTRAMUSCULAR; INTRAVENOUS ONCE
Status: DISCONTINUED | OUTPATIENT
Start: 2019-08-19 | End: 2019-08-19 | Stop reason: HOSPADM

## 2019-08-19 RX ORDER — FENTANYL CITRATE 50 UG/ML
50 INJECTION, SOLUTION INTRAMUSCULAR; INTRAVENOUS EVERY 5 MIN PRN
Status: DISCONTINUED | OUTPATIENT
Start: 2019-08-19 | End: 2019-08-19 | Stop reason: HOSPADM

## 2019-08-19 RX ORDER — FENTANYL CITRATE 50 UG/ML
25 INJECTION, SOLUTION INTRAMUSCULAR; INTRAVENOUS EVERY 5 MIN PRN
Status: DISCONTINUED | OUTPATIENT
Start: 2019-08-19 | End: 2019-08-19 | Stop reason: HOSPADM

## 2019-08-19 RX ADMIN — PROPOFOL 50 MG: 10 INJECTION, EMULSION INTRAVENOUS at 14:42

## 2019-08-19 RX ADMIN — SODIUM CHLORIDE, POTASSIUM CHLORIDE, SODIUM LACTATE AND CALCIUM CHLORIDE: 600; 310; 30; 20 INJECTION, SOLUTION INTRAVENOUS at 14:54

## 2019-08-19 RX ADMIN — MIDAZOLAM HYDROCHLORIDE 1 MG: 1 INJECTION, SOLUTION INTRAMUSCULAR; INTRAVENOUS at 13:44

## 2019-08-19 RX ADMIN — LIDOCAINE HYDROCHLORIDE 50 MG: 10 INJECTION, SOLUTION EPIDURAL; INFILTRATION; INTRACAUDAL; PERINEURAL at 14:42

## 2019-08-19 RX ADMIN — FENTANYL CITRATE 50 MCG: 50 INJECTION, SOLUTION INTRAMUSCULAR; INTRAVENOUS at 13:44

## 2019-08-19 RX ADMIN — PROPOFOL 30 MG: 10 INJECTION, EMULSION INTRAVENOUS at 14:46

## 2019-08-19 RX ADMIN — SODIUM CHLORIDE, POTASSIUM CHLORIDE, SODIUM LACTATE AND CALCIUM CHLORIDE: 600; 310; 30; 20 INJECTION, SOLUTION INTRAVENOUS at 14:29

## 2019-08-19 RX ADMIN — PROPOFOL 30 MG: 10 INJECTION, EMULSION INTRAVENOUS at 14:48

## 2019-08-19 RX ADMIN — PROPOFOL 20 MG: 10 INJECTION, EMULSION INTRAVENOUS at 14:52

## 2019-08-19 RX ADMIN — SODIUM CHLORIDE, POTASSIUM CHLORIDE, SODIUM LACTATE AND CALCIUM CHLORIDE: 600; 310; 30; 20 INJECTION, SOLUTION INTRAVENOUS at 11:15

## 2019-08-19 RX ADMIN — MIDAZOLAM HYDROCHLORIDE 1 MG: 1 INJECTION, SOLUTION INTRAMUSCULAR; INTRAVENOUS at 13:50

## 2019-08-19 RX ADMIN — FENTANYL CITRATE 50 MCG: 50 INJECTION, SOLUTION INTRAMUSCULAR; INTRAVENOUS at 13:50

## 2019-08-19 RX ADMIN — PROPOFOL 30 MG: 10 INJECTION, EMULSION INTRAVENOUS at 14:44

## 2019-08-19 RX ADMIN — PROPOFOL 20 MG: 10 INJECTION, EMULSION INTRAVENOUS at 14:50

## 2019-08-19 ASSESSMENT — PULMONARY FUNCTION TESTS
PIF_VALUE: 1
PIF_VALUE: 0
PIF_VALUE: 0
PIF_VALUE: 1
PIF_VALUE: 0
PIF_VALUE: 1
PIF_VALUE: 0
PIF_VALUE: 1
PIF_VALUE: 0
PIF_VALUE: 1
PIF_VALUE: 0
PIF_VALUE: 1

## 2019-08-19 ASSESSMENT — PAIN SCALES - GENERAL
PAINLEVEL_OUTOF10: 0
PAINLEVEL_OUTOF10: 7
PAINLEVEL_OUTOF10: 0

## 2019-08-19 ASSESSMENT — PAIN - FUNCTIONAL ASSESSMENT: PAIN_FUNCTIONAL_ASSESSMENT: 0-10

## 2019-08-19 NOTE — ANESTHESIA PRE PROCEDURE
Department of Anesthesiology  Preprocedure Note       Name:  Kaela Lester   Age:  77 y.o.  :  1950                                          MRN:  [id number]         Date:  2019      Surgeon: Judy Roberts):  Steffany Matta MD    Procedure: EGD WITH GASTROSTOMY TUBE PLACEMENT - GI SCHEDULED (N/A )    Medications prior to admission:   Prior to Admission medications    Medication Sig Start Date End Date Taking? Authorizing Provider   omeprazole (PRILOSEC) 40 MG delayed release capsule take 1 capsule by mouth twice a day (TAKE 30 MINUTES PRIOR TO A MEAL) 19  Yes Historical Provider, MD   LORazepam (ATIVAN) 1 MG tablet take 1 tablet by mouth every evening 19 Yes TRAY Blackburn CNP   ZINC OXIDE, TOPICAL, 25 % OINT Apply 10 mLs topically 2 times daily 19  Yes Maty Ackerman MD   loperamide (IMODIUM) 2 MG capsule Take 1 capsule by mouth 4 times daily as needed for Diarrhea (TAKES WITH LOMOTIL) 19  Yes TRAY Jones - CNP   sertraline (ZOLOFT) 100 MG tablet TAKE 1 AND 1/2 TABLETS BY  MOUTH DAILY 19  Yes TRAY Blackburn CNP   clotrimazole-betamethasone (LOTRISONE) 1-0.05 % cream apply topically to affected area twice a day 19  Yes Mauricio Fountain MD   ibuprofen (ADVIL;MOTRIN) 600 MG tablet TAKE 1 TABLET BY MOUTH  EVERY 8 HOURS AS NEEDED FOR PAIN 12/10/18  Yes Meryle Dec, APRN - CNP   nystatin (MYCOSTATIN) 120414 UNIT/GM ointment Apply topically 2 times daily. 16  Yes Julio Viramontes DO   diltiazem (CARDIZEM) 60 MG tablet Take 90 mg by mouth 2 times daily  9/30/15  Yes Historical Provider, MD   Nutritional Supplements (PEPTAMEN AF) LIQD Take by mouth Indications: TUBE FEED 1500 CALORIES A DAY   Yes Historical Provider, MD   Diphenoxylate-Atropine (LOMOTIL PO) Take  by mouth as needed. Yes Historical Provider, MD   flecainide (TAMBOCOR) 50 MG tablet Take 50 mg by mouth daily.    Yes Historical Provider, MD   octreotide (SANDOSTATIN) 50 MCG/ML SOLN Inject 1 mL into the skin every 12 hours 7/17/19   Linn Price MD   cyanocobalamin 1000 MCG/ML injection inject 1 milliliter intramuscularly EVERY 1ST OF THE MONTH 6/4/19   TRAY Moralez CNP   cholestyramine Celine Roverto) 4 g packet Take 1 packet by mouth 2 times daily 5/10/19   Linn Price MD   B-D 3CC LUER-JUSTIN SYR 21GX1\" 21G X 1\" 3 ML MISC USE AS DIRECTED WITH VITAMIN B EVERY MONTH 8/21/18   TRAY Moralez CNP   Elastic Bandages & Supports (MEDICAL COMPRESSION STOCKINGS) MISC Knee high 20 -30 mm hg 11/14/17   Hiwot Ulloa MD   lactobacillus (BACID) TABS take 1 tablet by mouth three times a day 4/20/17   Liborio Patten DO   ondansetron (ZOFRAN) 8 MG tablet   Take 8 mg by mouth every 8 hours as needed  7/9/15   Historical Provider, MD   EPINEPHrine 0.3 MG/0.3ML SOAJ injection Inject 0.3 mg into the muscle. 1/2/15   Historical Provider, MD   Immune Globulin, Human, (HIZENTRA) 10 GM/50ML SOLN   Inject 10 g into the skin every 7 days [address] Provider, MD   Cholecalciferol (VITAMIN D-3) 5000 UNITS TABS Take by mouth daily     Historical Provider, MD   dicyclomine (BENTYL) 20 MG tablet Take 20 mg by mouth every 6 hours as needed. Historical Provider, MD       Current medications:    No current facility-administered medications for this encounter. Allergies:     Allergies   Allergen Reactions    Macrodantin [Nitrofurantoin Macrocrystal] Hives    Compazine [Prochlorperazine] Other (See Comments)     HYPER    Phenergan [Promethazine Hcl] Other (See Comments)     HYPER    Reglan [Metoclopramide] Other (See Comments)     HYPER, AGGITATED      Sulfa Antibiotics Rash    Vancomycin Other (See Comments)     HALLUCINATON    Vfend [Voriconazole] Other (See Comments)     hallucinations       Problem List:    Patient Active Problem List   Diagnosis Code    Depression F32.9    Anxiety F41.9    Irritable bowel syndrome K58.9    Gastroparesis K31.84    Fibromyalgia M79.7    CVID (common variable immunodeficiency) ([facility]) D83.9    Fatigue R53.83    Hypogammaglobulinemia ([facility]) D80.1    Renal insufficiency N28.9    Osteopenia M85.80    History of stress fracture Z87.312    Jejunostomy tube present ([facility]) Z93.4    Gastrointestinal tube present ([facility]) Z93.1    H/O sepsis Z86.19    Essential hypertension I10    Primary insomnia F51.01    Adverse drug effect T50.905A    Dry mouth R68.2    Family history of other condition Z84.89    Feeding difficulties R63.3    Cellulitis L03.90    CVID (common variable immunodeficiency) ([facility]) D83.9    Hypogammaglobulinemia ([address] D80.1    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    Dehydration E86.0    Postprocedural non-healing wound T81.89XA    Gastrostomy complication, unspecified ([facility]) K94.20       Past Medical History:        Diagnosis Date    Abdominal pain     Anxiety     CVID (common variable immunodeficiency) ([address]     Depression     Diarrhea     Difficulty swallowing     Dizziness     Fibromyalgia     Gastroparesis     Headache     History of blood transfusion     Hypogammaglobulinaemia, unspecified 2013    Irritable bowel syndrome     Jejunostomy tube present ([facility])     USES TO FEED SELF , PEPTAMEN AF 1500 BESSY A DAY    Nausea & vomiting     Numbness     Postprocedural non-healing wound 6/26/2019    RLS (restless legs syndrome)     S/P percutaneous endoscopic gastrostomy (PEG) tube placement ([address]     USES TO DRAIN STOMACH    Sepsis ([address] 06/2006    SVT (supraventricular tachycardia) ([address] 2006    ON RX    Wears glasses     Wears glasses        Past Surgical History:        Procedure Laterality Date    ABSCESS DRAINAGE  03/08/2018     near peg tube- local    BREAST BIOPSY Left 8/13/15    BUNIONECTOMY Right 1985    FOOT    CHOLECYSTECTOMY  1972    ENDOMETRIAL ABLATION  2005    GASTRIC FUNDOPLICATION  6742    sx that injured the vagus nerves and caused gastroparesis    GASTROSTOMY TUBE PLACEMENT  2004    for stomach drainage    ILEOSTOMY OR JEJUNOSTOMY  2005    J-tube for feeding    KNEE SURGERY Right 1986    tumor    ID OFFICE/OUTPT VISIT,PROCEDURE ONLY N/A 3/8/2018    INCISION AND DRAINAGE ABSCESS NEAR PEG TUBE performed by Nancy Antoine IV, DO at [address]  2005    done to help gastroparesis fr vagus nerve injury   [address] TUNNELED VENOUS PORT PLACEMENT  2004    REMOVED 2 YRS LATER    UPPER GASTROINTESTINAL ENDOSCOPY  1993-2012    X 15 SOME WITH BX., SOME FOR DILATATION       Social History:    Social History     Tobacco Use    Smoking status: Never Smoker    Smokeless tobacco: Never Used   Substance Use Topics    Alcohol use: No                                Counseling given: Not Answered      Vital Signs (Current):   Vitals:    08/19/19 1039   Weight: 170 lb (77.1 kg)   Height: 5' 10\" (1.778 m)                                              BP Readings from Last 3 Encounters:   08/16/19 114/72   08/12/19 121/69   08/07/19 135/83       NPO Status:                                                                                 BMI:   Wt Readings from Last 3 Encounters:   08/19/19 170 lb (77.1 kg)   08/16/19 172 lb (78 kg)   08/07/19 170 lb (77.1 kg)     Body mass index is 24.39 kg/m².     CBC:   Lab Results   Component Value Date    WBC 5.0 05/13/2019    RBC 3.89 05/13/2019    HGB 9.8 05/13/2019    HCT 30.7 05/13/2019    MCV 79 05/13/2019    RDW 17.3 05/13/2019     05/13/2019       CMP:   Lab Results   Component Value Date     06/17/2019    K 4.3 06/17/2019     06/17/2019    CO2 21 06/17/2019    BUN 32 06/17/2019    CREATININE 0.96 08/15/2019    GFRAA >60 08/15/2019    LABGLOM 58 08/15/2019    GLUCOSE 98 06/17/2019    PROT 7.8 06/17/2019    CALCIUM 9.4 06/17/2019    BILITOT 0.30 06/17/2019    ALKPHOS 119 06/17/2019    AST 23 08/15/2019    ALT 13 08/15/2019       POC Tests: No results for input(s): POCGLU, POCNA, POCK, POCCL, POCBUN, POCHEMO, POCHCT in the last 72 hours. Coags:   Lab Results   Component Value Date    PROTIME 10.4 04/15/2015    INR 1.0 04/15/2015    APTT 23.1 04/25/2014       HCG (If Applicable): No results found for: PREGTESTUR, PREGSERUM, HCG, HCGQUANT     ABGs: No results found for: PHART, PO2ART, GUL8MOD, XRD3UVA, BEART, G5CYTKQH     Type & Screen (If Applicable):  No results found for: LABABO, LABRH    Anesthesia Evaluation    Airway: Mallampati: II     Neck ROM: full   Dental:          Pulmonary:Negative Pulmonary ROS breath sounds clear to auscultation                             Cardiovascular:    (+) hypertension:, dysrhythmias: SVT,         Rhythm: regular  Rate: normal                    Neuro/Psych:   (+) neuromuscular disease:, headaches:, psychiatric history:depression/anxiety             GI/Hepatic/Renal: Neg GI/Hepatic/Renal ROS            Endo/Other: Negative Endo/Other ROS                    Abdominal:         (-) obese     Vascular: negative vascular ROS. Anesthesia Plan      general and MAC     ASA 3       Induction: intravenous. Anesthetic plan and risks discussed with patient. Plan discussed with CRNA.                   Jenifer Fernandes MD   8/19/2019

## 2019-08-19 NOTE — H&P
Historical Provider, MD   dicyclomine (BENTYL) 20 MG tablet Take 20 mg by mouth every 6 hours as needed. Historical Provider, MD     Past Medical History    has a past medical history of Abdominal pain, Anxiety, CVID (common variable immunodeficiency) ([address] Depression, Diarrhea, Difficulty swallowing, Dizziness, Fibromyalgia, Gastroparesis, Headache, History of blood transfusion, Hypogammaglobulinaemia, unspecified, Irritable bowel syndrome, Jejunostomy tube present ([address] Nausea & vomiting, Numbness, Postprocedural non-healing wound, RLS (restless legs syndrome), S/P percutaneous endoscopic gastrostomy (PEG) tube placement ([address] Sepsis ([address] Snores, SVT (supraventricular tachycardia) ([address] Wears glasses, and Wears glasses. Past Surgical History   has a past surgical history that includes Cholecystectomy (); Tonsillectomy and adenoidectomy (); Bunionectomy (Right, ); knee surgery (Right, ); Pyloroplasty (); Gastrostomy tube placement (); Gastric fundoplication (7733); ileostomy or jejunostomy (); Upper gastrointestinal endoscopy (1860-7729); Tunneled venous port placement (); Endometrial ablation (); Breast biopsy (Left, 8/13/15); Abscess Drainage (2018); and pr office/outpt visit,procedure only (N/A, 3/8/2018). Social History   reports that she has never smoked. She has never used smokeless tobacco.   reports that she does not drink alcohol. reports that she does not use drugs. Marital Status   Children 2  Occupation retired [employer]   Family History  Family Status   Relation Name Status    Mother LIZA Alive    PGF      [address]      Father Marina Marcial     [address] [place] [address] Brother [facility] [address] Alive    [facility] MICHELLE Alive    MG      MGF       family history includes Cancer in her brother, maternal grandfather, and maternal grandmother; Colon Cancer in her maternal uncle;  Heart Disease in her

## 2019-08-19 NOTE — ANESTHESIA POSTPROCEDURE EVALUATION
Department of Anesthesiology  Postprocedure Note    Patient: Esthela Gu  MRN: [id number]  YOB: 1950  Date of evaluation: 8/19/2019  Time:  4:00 PM     Procedure Summary     Date:  08/19/19 Room / Location:  [facility] OR [address] [place] [address] OR    Anesthesia Start:  9042 Anesthesia Stop:  9374    Procedure:  EGD WITH GASTROSTOMY TUBE PLACEMENT - GI SCHEDULED (N/A ) Diagnosis:  (GASTROPERESIS)    Surgeon:  Kelly Briscoe MD Responsible Provider:  Yuliet Luther MD    Anesthesia Type:  general, MAC ASA Status:  3          Anesthesia Type: general, MAC    Brie Phase I: Brie Score: 10    Brie Phase II: Brie Score: 10    Last vitals: Reviewed and per EMR flowsheets.        Anesthesia Post Evaluation    Patient location during evaluation: PACU  Patient participation: complete - patient participated  Level of consciousness: awake and alert  Pain score: 0  Airway patency: patent  Nausea & Vomiting: no nausea and no vomiting  Complications: no  Cardiovascular status: hemodynamically stable  Respiratory status: room air  Hydration status: euvolemic

## 2019-08-20 NOTE — OP NOTE
Patient: Joe Choi  YOB: 1950  MRN: [id number]  Date of Procedure: 8/19/2019     Pre-Op Diagnosis: GASTROPERESIS     Post-Op Diagnosis: Same       Procedure(s):  EGD WITH GASTROSTOMY TUBE PLACEMENT - GI SCHEDULED     Anesthesia: General, Monitor Anesthesia Care     Surgeon(s):  Norma Colbert MD     Assistant: Demarcus Gallegos PGY4     Estimated Blood Loss (mL): 5     Complications: None     Specimens:   * No specimens in log *     Implants:  * No implants in log *      Drains:   Open Drain Left Abdomen;LUQ (Active)       Gastrostomy/Enterostomy PEG-jejunostomy Umbilicus 1 (Active)       Gastrostomy/Enterostomy Jejunostomy LLQ (Active)       Gastrostomy/Enterostomy/Jejunostomy PEG-Jejunostomy LLQ 1 (Active)         Findings: Dilation of gastrocutaneous fistula tract with hemostats. Placement of 18Fr. G tube. 10cc air insufflated into balloon. 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 Was attempted to place the patient on octreotide but was denied per insurance company. She has been having lots of issues with skin breakdown due to constant drainage of gastric contents onto her abdominal wall. She has recently underwent a CT scan of her abdomen pelvis that did not demonstrate any subcutaneous abscesses but was concerning for a widely patent gastrocutaneous fistula. The patient has upset about the constant draining and being n.p.o. and would like replacement of gastrostomy tube to help minimize output per gastrocutaneous fistula. The risk, benefits and alternatives were discussed with the patient and undergoing a endoscopic gastroduodenoscopy with gastrostomy tube placement. All questions were answered and informed consent was signed. Procedure:   Patient was taken back to the operating room and left in the supine condition. She underwent monitored anesthesia care per anesthesia guidelines.   A timeout was

## 2019-08-28 ENCOUNTER — OFFICE VISIT (OUTPATIENT)
Dept: BARIATRICS/WEIGHT MGMT | Age: 69
End: 2019-08-28

## 2019-08-28 ENCOUNTER — TELEPHONE (OUTPATIENT)
Dept: WOUND CARE | Age: 69
End: 2019-08-28

## 2019-08-28 VITALS
BODY MASS INDEX: 23.91 KG/M2 | WEIGHT: 167 LBS | SYSTOLIC BLOOD PRESSURE: 132 MMHG | HEIGHT: 70 IN | HEART RATE: 66 BPM | DIASTOLIC BLOOD PRESSURE: 84 MMHG | RESPIRATION RATE: 20 BRPM

## 2019-08-28 DIAGNOSIS — Z09 S/P GASTROSTOMY TUBE (G TUBE) PLACEMENT, FOLLOW-UP EXAM: ICD-10-CM

## 2019-08-28 DIAGNOSIS — K31.6 GASTROCUTANEOUS FISTULA: Primary | ICD-10-CM

## 2019-08-28 DIAGNOSIS — K31.84 GASTROPARESIS: ICD-10-CM

## 2019-08-28 DIAGNOSIS — Z09 STATUS POST GASTROSTOMY TUBE (G TUBE) PLACEMENT, FOLLOW-UP EXAM: ICD-10-CM

## 2019-08-28 DIAGNOSIS — Z98.890 HISTORY OF ESOPHAGOGASTRODUODENOSCOPY (EGD): ICD-10-CM

## 2019-08-28 PROCEDURE — 99024 POSTOP FOLLOW-UP VISIT: CPT | Performed by: SURGERY

## 2019-08-30 ENCOUNTER — TELEPHONE (OUTPATIENT)
Dept: PRIMARY CARE CLINIC | Age: 69
End: 2019-08-30

## 2019-09-27 PROBLEM — Z09 S/P GASTROSTOMY TUBE (G TUBE) PLACEMENT, FOLLOW-UP EXAM: Status: RESOLVED | Noted: 2019-08-28 | Resolved: 2019-09-27

## 2019-11-07 ENCOUNTER — OFFICE VISIT (OUTPATIENT)
Dept: PRIMARY CARE CLINIC | Age: 69
End: 2019-11-07
Payer: MEDICARE

## 2019-11-07 VITALS
BODY MASS INDEX: 24.57 KG/M2 | SYSTOLIC BLOOD PRESSURE: 134 MMHG | HEIGHT: 70 IN | RESPIRATION RATE: 18 BRPM | DIASTOLIC BLOOD PRESSURE: 84 MMHG | HEART RATE: 76 BPM | WEIGHT: 171.6 LBS

## 2019-11-07 DIAGNOSIS — K52.9 CHRONIC DIARRHEA: ICD-10-CM

## 2019-11-07 DIAGNOSIS — G62.9 NEUROPATHY: ICD-10-CM

## 2019-11-07 DIAGNOSIS — F51.01 PRIMARY INSOMNIA: ICD-10-CM

## 2019-11-07 DIAGNOSIS — Z93.1 PEG (PERCUTANEOUS ENDOSCOPIC GASTROSTOMY) STATUS (HCC): ICD-10-CM

## 2019-11-07 DIAGNOSIS — F41.9 ANXIETY: ICD-10-CM

## 2019-11-07 DIAGNOSIS — K94.20 GASTROSTOMY COMPLICATION, UNSPECIFIED (HCC): Chronic | ICD-10-CM

## 2019-11-07 DIAGNOSIS — I10 ESSENTIAL HYPERTENSION: Primary | Chronic | ICD-10-CM

## 2019-11-07 DIAGNOSIS — Z93.4 JEJUNOSTOMY TUBE PRESENT (HCC): Chronic | ICD-10-CM

## 2019-11-07 PROCEDURE — G8420 CALC BMI NORM PARAMETERS: HCPCS | Performed by: NURSE PRACTITIONER

## 2019-11-07 PROCEDURE — 1123F ACP DISCUSS/DSCN MKR DOCD: CPT | Performed by: NURSE PRACTITIONER

## 2019-11-07 PROCEDURE — 99214 OFFICE O/P EST MOD 30 MIN: CPT | Performed by: NURSE PRACTITIONER

## 2019-11-07 PROCEDURE — G8399 PT W/DXA RESULTS DOCUMENT: HCPCS | Performed by: NURSE PRACTITIONER

## 2019-11-07 PROCEDURE — 3017F COLORECTAL CA SCREEN DOC REV: CPT | Performed by: NURSE PRACTITIONER

## 2019-11-07 PROCEDURE — G8482 FLU IMMUNIZE ORDER/ADMIN: HCPCS | Performed by: NURSE PRACTITIONER

## 2019-11-07 PROCEDURE — G8427 DOCREV CUR MEDS BY ELIG CLIN: HCPCS | Performed by: NURSE PRACTITIONER

## 2019-11-07 PROCEDURE — 1036F TOBACCO NON-USER: CPT | Performed by: NURSE PRACTITIONER

## 2019-11-07 PROCEDURE — 4040F PNEUMOC VAC/ADMIN/RCVD: CPT | Performed by: NURSE PRACTITIONER

## 2019-11-07 PROCEDURE — 1090F PRES/ABSN URINE INCON ASSESS: CPT | Performed by: NURSE PRACTITIONER

## 2019-11-07 RX ORDER — LOPERAMIDE HYDROCHLORIDE 2 MG/1
2 CAPSULE ORAL 4 TIMES DAILY PRN
Qty: 360 CAPSULE | Refills: 3 | Status: SHIPPED | OUTPATIENT
Start: 2019-11-07 | End: 2021-01-21

## 2019-11-07 RX ORDER — SERTRALINE HYDROCHLORIDE 100 MG/1
100 TABLET, FILM COATED ORAL DAILY
COMMUNITY
End: 2020-01-06

## 2019-11-07 RX ORDER — LORAZEPAM 1 MG/1
TABLET ORAL
Qty: 30 TABLET | Refills: 0 | Status: SHIPPED | OUTPATIENT
Start: 2019-11-07 | End: 2020-02-04 | Stop reason: SDUPTHER

## 2019-11-07 RX ORDER — GABAPENTIN 300 MG/1
300 CAPSULE ORAL 2 TIMES DAILY
Qty: 180 CAPSULE | Refills: 3 | Status: SHIPPED | OUTPATIENT
Start: 2019-11-07 | End: 2020-07-14

## 2019-11-07 ASSESSMENT — ENCOUNTER SYMPTOMS
DIARRHEA: 0
TROUBLE SWALLOWING: 0
SHORTNESS OF BREATH: 0
BLOOD IN STOOL: 0
ABDOMINAL PAIN: 0
SORE THROAT: 0
WHEEZING: 0
COUGH: 0
VOMITING: 0
SINUS PRESSURE: 0
CONSTIPATION: 0
NAUSEA: 0

## 2019-11-15 ENCOUNTER — TELEPHONE (OUTPATIENT)
Dept: BARIATRICS/WEIGHT MGMT | Age: 69
End: 2019-11-15

## 2019-11-19 ENCOUNTER — OFFICE VISIT (OUTPATIENT)
Dept: BARIATRICS/WEIGHT MGMT | Age: 69
End: 2019-11-19
Payer: MEDICARE

## 2019-11-19 VITALS
DIASTOLIC BLOOD PRESSURE: 80 MMHG | SYSTOLIC BLOOD PRESSURE: 136 MMHG | TEMPERATURE: 97.6 F | HEIGHT: 70 IN | WEIGHT: 173 LBS | BODY MASS INDEX: 24.77 KG/M2

## 2019-11-19 DIAGNOSIS — K31.6 GASTROCUTANEOUS FISTULA: ICD-10-CM

## 2019-11-19 DIAGNOSIS — R63.30 FEEDING DIFFICULTIES: Primary | ICD-10-CM

## 2019-11-19 PROCEDURE — 1123F ACP DISCUSS/DSCN MKR DOCD: CPT | Performed by: SURGERY

## 2019-11-19 PROCEDURE — G8482 FLU IMMUNIZE ORDER/ADMIN: HCPCS | Performed by: SURGERY

## 2019-11-19 PROCEDURE — G8427 DOCREV CUR MEDS BY ELIG CLIN: HCPCS | Performed by: SURGERY

## 2019-11-19 PROCEDURE — 3017F COLORECTAL CA SCREEN DOC REV: CPT | Performed by: SURGERY

## 2019-11-19 PROCEDURE — G8399 PT W/DXA RESULTS DOCUMENT: HCPCS | Performed by: SURGERY

## 2019-11-19 PROCEDURE — 1036F TOBACCO NON-USER: CPT | Performed by: SURGERY

## 2019-11-19 PROCEDURE — 99213 OFFICE O/P EST LOW 20 MIN: CPT | Performed by: SURGERY

## 2019-11-19 PROCEDURE — G8420 CALC BMI NORM PARAMETERS: HCPCS | Performed by: SURGERY

## 2019-11-19 PROCEDURE — 1090F PRES/ABSN URINE INCON ASSESS: CPT | Performed by: SURGERY

## 2019-11-19 PROCEDURE — 4040F PNEUMOC VAC/ADMIN/RCVD: CPT | Performed by: SURGERY

## 2019-11-19 RX ORDER — CEPHALEXIN 250 MG/1
500 CAPSULE ORAL 2 TIMES DAILY
Qty: 14 CAPSULE | Refills: 1 | Status: SHIPPED | OUTPATIENT
Start: 2019-11-19 | End: 2019-12-09 | Stop reason: ALTCHOICE

## 2019-12-09 ENCOUNTER — OFFICE VISIT (OUTPATIENT)
Dept: PRIMARY CARE CLINIC | Age: 69
End: 2019-12-09
Payer: MEDICARE

## 2019-12-09 VITALS
RESPIRATION RATE: 18 BRPM | SYSTOLIC BLOOD PRESSURE: 156 MMHG | DIASTOLIC BLOOD PRESSURE: 88 MMHG | BODY MASS INDEX: 25.25 KG/M2 | TEMPERATURE: 97.6 F | WEIGHT: 176.4 LBS | HEIGHT: 70 IN | HEART RATE: 68 BPM

## 2019-12-09 DIAGNOSIS — R06.02 SOB (SHORTNESS OF BREATH) ON EXERTION: ICD-10-CM

## 2019-12-09 DIAGNOSIS — I10 ESSENTIAL HYPERTENSION: ICD-10-CM

## 2019-12-09 DIAGNOSIS — Z93.1 PEG (PERCUTANEOUS ENDOSCOPIC GASTROSTOMY) STATUS (HCC): ICD-10-CM

## 2019-12-09 DIAGNOSIS — Z93.4 JEJUNOSTOMY TUBE PRESENT (HCC): ICD-10-CM

## 2019-12-09 DIAGNOSIS — G62.9 NEUROPATHY: Primary | ICD-10-CM

## 2019-12-09 DIAGNOSIS — G44.209 ACUTE NON INTRACTABLE TENSION-TYPE HEADACHE: ICD-10-CM

## 2019-12-09 PROCEDURE — G8482 FLU IMMUNIZE ORDER/ADMIN: HCPCS | Performed by: NURSE PRACTITIONER

## 2019-12-09 PROCEDURE — 1036F TOBACCO NON-USER: CPT | Performed by: NURSE PRACTITIONER

## 2019-12-09 PROCEDURE — 3017F COLORECTAL CA SCREEN DOC REV: CPT | Performed by: NURSE PRACTITIONER

## 2019-12-09 PROCEDURE — 1123F ACP DISCUSS/DSCN MKR DOCD: CPT | Performed by: NURSE PRACTITIONER

## 2019-12-09 PROCEDURE — 4040F PNEUMOC VAC/ADMIN/RCVD: CPT | Performed by: NURSE PRACTITIONER

## 2019-12-09 PROCEDURE — G8427 DOCREV CUR MEDS BY ELIG CLIN: HCPCS | Performed by: NURSE PRACTITIONER

## 2019-12-09 PROCEDURE — G8417 CALC BMI ABV UP PARAM F/U: HCPCS | Performed by: NURSE PRACTITIONER

## 2019-12-09 PROCEDURE — 1090F PRES/ABSN URINE INCON ASSESS: CPT | Performed by: NURSE PRACTITIONER

## 2019-12-09 PROCEDURE — 99214 OFFICE O/P EST MOD 30 MIN: CPT | Performed by: NURSE PRACTITIONER

## 2019-12-09 PROCEDURE — G8399 PT W/DXA RESULTS DOCUMENT: HCPCS | Performed by: NURSE PRACTITIONER

## 2019-12-09 RX ORDER — BUTALBITAL, ACETAMINOPHEN AND CAFFEINE 50; 325; 40 MG/1; MG/1; MG/1
1 TABLET ORAL EVERY 6 HOURS PRN
Qty: 90 TABLET | Refills: 1 | Status: SHIPPED | OUTPATIENT
Start: 2019-12-09

## 2019-12-09 ASSESSMENT — ENCOUNTER SYMPTOMS
WHEEZING: 0
NAUSEA: 0
COUGH: 0
ABDOMINAL PAIN: 0
BLOOD IN STOOL: 0
SHORTNESS OF BREATH: 0
VOMITING: 0
SINUS PRESSURE: 0
DIARRHEA: 0
SORE THROAT: 0
CONSTIPATION: 0
TROUBLE SWALLOWING: 0

## 2019-12-10 DIAGNOSIS — Z93.4 JEJUNOSTOMY TUBE PRESENT (HCC): ICD-10-CM

## 2019-12-10 DIAGNOSIS — G44.209 ACUTE NON INTRACTABLE TENSION-TYPE HEADACHE: ICD-10-CM

## 2019-12-10 DIAGNOSIS — Z93.1 PEG (PERCUTANEOUS ENDOSCOPIC GASTROSTOMY) STATUS (HCC): ICD-10-CM

## 2019-12-10 DIAGNOSIS — R06.02 SOB (SHORTNESS OF BREATH) ON EXERTION: ICD-10-CM

## 2019-12-11 ENCOUNTER — PATIENT MESSAGE (OUTPATIENT)
Dept: PRIMARY CARE CLINIC | Age: 69
End: 2019-12-11

## 2019-12-11 DIAGNOSIS — D50.8 OTHER IRON DEFICIENCY ANEMIA: Primary | ICD-10-CM

## 2019-12-23 ENCOUNTER — HOSPITAL ENCOUNTER (OUTPATIENT)
Facility: MEDICAL CENTER | Age: 69
Discharge: HOME | End: 2019-12-23
Payer: MEDICARE

## 2019-12-23 ENCOUNTER — TELEPHONE (OUTPATIENT)
Dept: ONCOLOGY | Age: 69
End: 2019-12-23

## 2019-12-23 ENCOUNTER — INITIAL CONSULT (OUTPATIENT)
Dept: ONCOLOGY | Age: 69
End: 2019-12-23
Payer: MEDICARE

## 2019-12-23 ENCOUNTER — HOSPITAL ENCOUNTER (OUTPATIENT)
Age: 69
Discharge: HOME | End: 2019-12-23
Payer: MEDICARE

## 2019-12-23 VITALS
TEMPERATURE: 98.3 F | WEIGHT: 175.9 LBS | SYSTOLIC BLOOD PRESSURE: 138 MMHG | HEART RATE: 65 BPM | DIASTOLIC BLOOD PRESSURE: 73 MMHG | RESPIRATION RATE: 18 BRPM | BODY MASS INDEX: 25.18 KG/M2 | HEIGHT: 70 IN

## 2019-12-23 DIAGNOSIS — K90.9 IRON MALABSORPTION: ICD-10-CM

## 2019-12-23 DIAGNOSIS — D50.8 OTHER IRON DEFICIENCY ANEMIA: ICD-10-CM

## 2019-12-23 PROBLEM — D50.9 IRON DEFICIENCY ANEMIA: Status: ACTIVE | Noted: 2019-12-23

## 2019-12-23 LAB
ABSOLUTE EOS #: 0.11 K/UL (ref 0–0.44)
ABSOLUTE IMMATURE GRANULOCYTE: 0 K/UL (ref 0–0.3)
ABSOLUTE LYMPH #: 1.43 K/UL (ref 1.1–3.7)
ABSOLUTE MONO #: 0.38 K/UL (ref 0.1–1.2)
BASOPHILS # BLD: 1 % (ref 0–2)
BASOPHILS ABSOLUTE: 0.06 K/UL (ref 0–0.2)
DIFFERENTIAL TYPE: ABNORMAL
EOSINOPHILS RELATIVE PERCENT: 3 % (ref 1–4)
FERRITIN: 8 UG/L (ref 13–150)
HCT VFR BLD CALC: 29.9 % (ref 36.3–47.1)
HEMOGLOBIN: 9.2 G/DL (ref 11.9–15.1)
IMMATURE GRANULOCYTES: 0 %
LYMPHOCYTES # BLD: 32 % (ref 24–43)
MCH RBC QN AUTO: 25.6 PG (ref 25.2–33.5)
MCHC RBC AUTO-ENTMCNC: 30.8 G/DL (ref 28.4–34.8)
MCV RBC AUTO: 83.1 FL (ref 82.6–102.9)
MONOCYTES # BLD: 9 % (ref 3–12)
NRBC AUTOMATED: ABNORMAL PER 100 WBC
PDW BLD-RTO: 17 % (ref 11.8–14.4)
PLATELET # BLD: 274 K/UL (ref 138–453)
PLATELET ESTIMATE: ABNORMAL
PMV BLD AUTO: 10.4 FL (ref 8.1–13.5)
RBC # BLD: 3.6 M/UL (ref 3.95–5.11)
RBC # BLD: ABNORMAL 10*6/UL
SEG NEUTROPHILS: 56 % (ref 36–65)
SEGMENTED NEUTROPHILS ABSOLUTE COUNT: 2.48 K/UL (ref 1.5–8.1)
WBC # BLD: 4.5 K/UL (ref 3.5–11.3)
WBC # BLD: ABNORMAL 10*3/UL

## 2019-12-23 PROCEDURE — 85025 COMPLETE CBC W/AUTO DIFF WBC: CPT

## 2019-12-23 PROCEDURE — G8482 FLU IMMUNIZE ORDER/ADMIN: HCPCS | Performed by: INTERNAL MEDICINE

## 2019-12-23 PROCEDURE — 82728 ASSAY OF FERRITIN: CPT

## 2019-12-23 PROCEDURE — G8417 CALC BMI ABV UP PARAM F/U: HCPCS | Performed by: INTERNAL MEDICINE

## 2019-12-23 PROCEDURE — 36415 COLL VENOUS BLD VENIPUNCTURE: CPT

## 2019-12-23 PROCEDURE — 99204 OFFICE O/P NEW MOD 45 MIN: CPT | Performed by: INTERNAL MEDICINE

## 2019-12-23 PROCEDURE — 1090F PRES/ABSN URINE INCON ASSESS: CPT | Performed by: INTERNAL MEDICINE

## 2019-12-23 PROCEDURE — G8427 DOCREV CUR MEDS BY ELIG CLIN: HCPCS | Performed by: INTERNAL MEDICINE

## 2019-12-23 PROCEDURE — 99201 HC NEW PT, E/M LEVEL 1: CPT | Performed by: INTERNAL MEDICINE

## 2019-12-23 RX ORDER — SODIUM CHLORIDE 0.9 % (FLUSH) 0.9 %
5 SYRINGE (ML) INJECTION PRN
Status: CANCELLED | OUTPATIENT
Start: 2020-01-16

## 2019-12-23 RX ORDER — HEPARIN SODIUM (PORCINE) LOCK FLUSH IV SOLN 100 UNIT/ML 100 UNIT/ML
500 SOLUTION INTRAVENOUS PRN
Status: CANCELLED | OUTPATIENT
Start: 2020-01-16

## 2019-12-23 RX ORDER — METHYLPREDNISOLONE SODIUM SUCCINATE 125 MG/2ML
125 INJECTION, POWDER, LYOPHILIZED, FOR SOLUTION INTRAMUSCULAR; INTRAVENOUS ONCE
Status: CANCELLED | OUTPATIENT
Start: 2020-01-16

## 2019-12-23 RX ORDER — SODIUM CHLORIDE 9 MG/ML
INJECTION, SOLUTION INTRAVENOUS CONTINUOUS
Status: CANCELLED | OUTPATIENT
Start: 2020-01-16

## 2019-12-23 RX ORDER — EPINEPHRINE 1 MG/ML
0.3 INJECTION, SOLUTION, CONCENTRATE INTRAVENOUS PRN
Status: CANCELLED | OUTPATIENT
Start: 2020-01-16

## 2019-12-23 RX ORDER — DIPHENHYDRAMINE HYDROCHLORIDE 50 MG/ML
50 INJECTION INTRAMUSCULAR; INTRAVENOUS ONCE
Status: CANCELLED | OUTPATIENT
Start: 2020-01-16

## 2019-12-23 RX ORDER — SODIUM CHLORIDE 0.9 % (FLUSH) 0.9 %
10 SYRINGE (ML) INJECTION PRN
Status: CANCELLED | OUTPATIENT
Start: 2020-01-16

## 2019-12-26 ENCOUNTER — TELEPHONE (OUTPATIENT)
Dept: ONCOLOGY | Age: 69
End: 2019-12-26

## 2020-01-06 ENCOUNTER — TELEPHONE (OUTPATIENT)
Dept: ONCOLOGY | Age: 70
End: 2020-01-06

## 2020-01-16 ENCOUNTER — HOSPITAL ENCOUNTER (OUTPATIENT)
Dept: INFUSION THERAPY | Facility: MEDICAL CENTER | Age: 70
Discharge: HOME | End: 2020-01-16
Payer: MEDICARE

## 2020-01-16 VITALS
RESPIRATION RATE: 18 BRPM | TEMPERATURE: 98.6 F | SYSTOLIC BLOOD PRESSURE: 132 MMHG | DIASTOLIC BLOOD PRESSURE: 79 MMHG | HEART RATE: 64 BPM

## 2020-01-16 DIAGNOSIS — K90.9 IRON MALABSORPTION: ICD-10-CM

## 2020-01-16 DIAGNOSIS — D50.8 OTHER IRON DEFICIENCY ANEMIA: Primary | ICD-10-CM

## 2020-01-16 PROCEDURE — 2580000003 HC RX 258: Performed by: INTERNAL MEDICINE

## 2020-01-16 PROCEDURE — 96374 THER/PROPH/DIAG INJ IV PUSH: CPT

## 2020-01-16 PROCEDURE — 6360000002 HC RX W HCPCS: Performed by: INTERNAL MEDICINE

## 2020-01-16 RX ORDER — SODIUM CHLORIDE 0.9 % (FLUSH) 0.9 %
10 SYRINGE (ML) INJECTION PRN
Status: CANCELLED | OUTPATIENT
Start: 2020-01-23

## 2020-01-16 RX ORDER — SODIUM CHLORIDE 9 MG/ML
INJECTION, SOLUTION INTRAVENOUS CONTINUOUS
Status: DISCONTINUED | OUTPATIENT
Start: 2020-01-16 | End: 2020-01-17 | Stop reason: HOSPADM

## 2020-01-16 RX ORDER — SODIUM CHLORIDE 9 MG/ML
INJECTION, SOLUTION INTRAVENOUS CONTINUOUS
Status: CANCELLED | OUTPATIENT
Start: 2020-01-23

## 2020-01-16 RX ORDER — SODIUM CHLORIDE 0.9 % (FLUSH) 0.9 %
5 SYRINGE (ML) INJECTION PRN
Status: CANCELLED | OUTPATIENT
Start: 2020-01-23

## 2020-01-16 RX ORDER — HEPARIN SODIUM (PORCINE) LOCK FLUSH IV SOLN 100 UNIT/ML 100 UNIT/ML
500 SOLUTION INTRAVENOUS PRN
Status: CANCELLED | OUTPATIENT
Start: 2020-01-23

## 2020-01-16 RX ORDER — METHYLPREDNISOLONE SODIUM SUCCINATE 125 MG/2ML
125 INJECTION, POWDER, LYOPHILIZED, FOR SOLUTION INTRAMUSCULAR; INTRAVENOUS ONCE
Status: CANCELLED | OUTPATIENT
Start: 2020-01-23

## 2020-01-16 RX ORDER — DIPHENHYDRAMINE HYDROCHLORIDE 50 MG/ML
50 INJECTION INTRAMUSCULAR; INTRAVENOUS ONCE
Status: CANCELLED | OUTPATIENT
Start: 2020-01-23

## 2020-01-16 RX ADMIN — FERRIC CARBOXYMALTOSE INJECTION 750 MG: 50 INJECTION, SOLUTION INTRAVENOUS at 14:55

## 2020-01-16 RX ADMIN — SODIUM CHLORIDE: 9 INJECTION, SOLUTION INTRAVENOUS at 14:51

## 2020-01-16 NOTE — PROGRESS NOTES
Pt arrives per ambulatory with  and orders reviewed and NS infusing well and pt given information from Traffio on injectafer and possible side effects and signs of allergy to report to writer discussed and pt states understanding. Pt tolerated injectafer well and no reactions or complaints and blood return present throughout infusion. Pt observed for 30 min post infusion and pt states tolerated well and has next appt for 2nd infusion. Pt discharged per ambulatory with .

## 2020-01-23 ENCOUNTER — HOSPITAL ENCOUNTER (OUTPATIENT)
Dept: INFUSION THERAPY | Facility: MEDICAL CENTER | Age: 70
Discharge: HOME | End: 2020-01-23
Payer: MEDICARE

## 2020-01-23 VITALS
TEMPERATURE: 97.7 F | DIASTOLIC BLOOD PRESSURE: 80 MMHG | HEART RATE: 65 BPM | RESPIRATION RATE: 16 BRPM | SYSTOLIC BLOOD PRESSURE: 146 MMHG

## 2020-01-23 DIAGNOSIS — K90.9 IRON MALABSORPTION: ICD-10-CM

## 2020-01-23 DIAGNOSIS — D50.8 OTHER IRON DEFICIENCY ANEMIA: Primary | ICD-10-CM

## 2020-01-23 PROCEDURE — 6360000002 HC RX W HCPCS: Performed by: INTERNAL MEDICINE

## 2020-01-23 PROCEDURE — 96365 THER/PROPH/DIAG IV INF INIT: CPT

## 2020-01-23 PROCEDURE — 2580000003 HC RX 258: Performed by: INTERNAL MEDICINE

## 2020-01-23 RX ORDER — SODIUM CHLORIDE 0.9 % (FLUSH) 0.9 %
5 SYRINGE (ML) INJECTION PRN
Status: CANCELLED | OUTPATIENT
Start: 2020-01-23

## 2020-01-23 RX ORDER — DIPHENHYDRAMINE HYDROCHLORIDE 50 MG/ML
50 INJECTION INTRAMUSCULAR; INTRAVENOUS ONCE
Status: CANCELLED | OUTPATIENT
Start: 2020-01-23

## 2020-01-23 RX ORDER — SODIUM CHLORIDE 9 MG/ML
INJECTION, SOLUTION INTRAVENOUS CONTINUOUS
Status: CANCELLED | OUTPATIENT
Start: 2020-01-23

## 2020-01-23 RX ORDER — SODIUM CHLORIDE 0.9 % (FLUSH) 0.9 %
10 SYRINGE (ML) INJECTION PRN
Status: CANCELLED | OUTPATIENT
Start: 2020-01-23

## 2020-01-23 RX ORDER — SODIUM CHLORIDE 9 MG/ML
INJECTION, SOLUTION INTRAVENOUS CONTINUOUS
Status: DISCONTINUED | OUTPATIENT
Start: 2020-01-23 | End: 2020-01-24 | Stop reason: HOSPADM

## 2020-01-23 RX ORDER — METHYLPREDNISOLONE SODIUM SUCCINATE 125 MG/2ML
125 INJECTION, POWDER, LYOPHILIZED, FOR SOLUTION INTRAMUSCULAR; INTRAVENOUS ONCE
Status: CANCELLED | OUTPATIENT
Start: 2020-01-23

## 2020-01-23 RX ORDER — HEPARIN SODIUM (PORCINE) LOCK FLUSH IV SOLN 100 UNIT/ML 100 UNIT/ML
500 SOLUTION INTRAVENOUS PRN
Status: CANCELLED | OUTPATIENT
Start: 2020-01-23

## 2020-01-23 RX ADMIN — SODIUM CHLORIDE: 9 INJECTION, SOLUTION INTRAVENOUS at 15:14

## 2020-01-23 RX ADMIN — FERRIC CARBOXYMALTOSE INJECTION 750 MG: 50 INJECTION, SOLUTION INTRAVENOUS at 15:14

## 2020-01-23 NOTE — PROGRESS NOTES
Pt arrived ambulatory for 2 of 2 Injectafer. Alejandra and complaints or concerns, no issue after 1st infusion . Peripheral IV started per policy. Injectafer infused , no sign of adverse reaction . Line flushed , pt monitored 30 minutes post infusion , no adverse reaction . IV removed per policy , pt ambulatory at discharge .

## 2020-02-03 ENCOUNTER — PATIENT MESSAGE (OUTPATIENT)
Dept: PRIMARY CARE CLINIC | Age: 70
End: 2020-02-03

## 2020-02-03 RX ORDER — IBUPROFEN 600 MG/1
600 TABLET ORAL EVERY 8 HOURS PRN
Qty: 270 TABLET | Refills: 2 | Status: SHIPPED | OUTPATIENT
Start: 2020-02-03 | End: 2022-03-17 | Stop reason: ALTCHOICE

## 2020-02-03 NOTE — TELEPHONE ENCOUNTER
Last OV 12/09/2019    Next OV 03/09/2020    Health Maintenance   Topic Date Due    Annual Wellness Visit (AWV)  12/09/2021 (Originally 6/17/2019)    Potassium monitoring  12/09/2020    Creatinine monitoring  12/09/2020    Breast cancer screen  02/28/2021    Colon cancer screen colonoscopy  01/12/2022    Lipid screen  08/15/2024    DTaP/Tdap/Td vaccine (2 - Td) 04/01/2026    DEXA (modify frequency per FRAX score)  Completed    Flu vaccine  Completed    Pneumococcal 65+ years Vaccine  Completed    Hepatitis C screen  Completed             (applicable per patient's age: Cancer Screenings, Depression Screening, Fall Risk Screening, Immunizations)    LDL Cholesterol (mg/dL)   Date Value   08/15/2019 107     LDL Calculated (mg/dL)   Date Value   06/28/2018 108     AST (U/L)   Date Value   08/15/2019 23     ALT (U/L)   Date Value   08/15/2019 13     BUN (mg/dL)   Date Value   06/17/2019 32 (H)      (goal A1C is < 7)   (goal LDL is <100) need 30-50% reduction from baseline     BP Readings from Last 3 Encounters:   01/23/20 (!) 146/80   01/16/20 132/79   12/23/19 138/73    (goal /80)      All Future Testing planned in [facility]:  Lab Frequency Next Occurrence   Anaerobic And Aerobic Culture Once 02/07/2020   CBC Auto Differential     Ferritin         Next Visit Date:  Future Appointments   Date Time Provider Samuel Faulkner   3/9/2020  9:20 AM TRAY Connolly - CNP [facility]   6/1/2020 10:15 AM SCHEDULE, P  [facility]   6/8/2020 12:45 PM Gary Mckeon MD [facility]            Patient Active Problem List:     Depression     Anxiety     Irritable bowel syndrome     Gastroparesis     Fibromyalgia     CVID (common variable immunodeficiency) ([address]     Fatigue     Hypogammaglobulinemia ([address]     Renal insufficiency     Osteopenia     History of stress fracture     Jejunostomy tube present ([address]     Gastrointestinal tube present ([address]     H/O sepsis

## 2020-02-04 RX ORDER — LORAZEPAM 1 MG/1
TABLET ORAL
Qty: 90 TABLET | Refills: 0 | Status: SHIPPED | OUTPATIENT
Start: 2020-02-04 | End: 2020-06-26 | Stop reason: SDUPTHER

## 2020-02-18 ENCOUNTER — HOSPITAL ENCOUNTER (OUTPATIENT)
Age: 70
Discharge: HOME | End: 2020-02-20
Payer: MEDICARE

## 2020-02-18 ENCOUNTER — HOSPITAL ENCOUNTER (OUTPATIENT)
Dept: GENERAL RADIOLOGY | Age: 70
Discharge: HOME | End: 2020-02-20
Payer: MEDICARE

## 2020-02-18 PROCEDURE — 6360000004 HC RX CONTRAST MEDICATION: Performed by: SURGERY

## 2020-02-18 PROCEDURE — 74018 RADEX ABDOMEN 1 VIEW: CPT

## 2020-02-18 RX ADMIN — IOHEXOL 30 ML: 240 INJECTION, SOLUTION INTRATHECAL; INTRAVASCULAR; INTRAVENOUS; ORAL at 09:58

## 2020-02-26 RX ORDER — CEPHALEXIN 250 MG/1
CAPSULE ORAL
Qty: 14 CAPSULE | Refills: 1 | Status: SHIPPED | OUTPATIENT
Start: 2020-02-26 | End: 2020-06-08 | Stop reason: ALTCHOICE

## 2020-03-09 ENCOUNTER — OFFICE VISIT (OUTPATIENT)
Dept: PRIMARY CARE CLINIC | Age: 70
End: 2020-03-09
Payer: MEDICARE

## 2020-03-09 VITALS
WEIGHT: 178.8 LBS | SYSTOLIC BLOOD PRESSURE: 142 MMHG | BODY MASS INDEX: 25.66 KG/M2 | DIASTOLIC BLOOD PRESSURE: 80 MMHG | OXYGEN SATURATION: 96 % | HEART RATE: 63 BPM

## 2020-03-09 PROCEDURE — 96372 THER/PROPH/DIAG INJ SC/IM: CPT | Performed by: NURSE PRACTITIONER

## 2020-03-09 PROCEDURE — G8427 DOCREV CUR MEDS BY ELIG CLIN: HCPCS | Performed by: NURSE PRACTITIONER

## 2020-03-09 PROCEDURE — G8417 CALC BMI ABV UP PARAM F/U: HCPCS | Performed by: NURSE PRACTITIONER

## 2020-03-09 PROCEDURE — 1036F TOBACCO NON-USER: CPT | Performed by: NURSE PRACTITIONER

## 2020-03-09 PROCEDURE — G8482 FLU IMMUNIZE ORDER/ADMIN: HCPCS | Performed by: NURSE PRACTITIONER

## 2020-03-09 PROCEDURE — 4040F PNEUMOC VAC/ADMIN/RCVD: CPT | Performed by: NURSE PRACTITIONER

## 2020-03-09 PROCEDURE — 99214 OFFICE O/P EST MOD 30 MIN: CPT | Performed by: NURSE PRACTITIONER

## 2020-03-09 PROCEDURE — G8399 PT W/DXA RESULTS DOCUMENT: HCPCS | Performed by: NURSE PRACTITIONER

## 2020-03-09 PROCEDURE — 1090F PRES/ABSN URINE INCON ASSESS: CPT | Performed by: NURSE PRACTITIONER

## 2020-03-09 PROCEDURE — 3017F COLORECTAL CA SCREEN DOC REV: CPT | Performed by: NURSE PRACTITIONER

## 2020-03-09 PROCEDURE — 1123F ACP DISCUSS/DSCN MKR DOCD: CPT | Performed by: NURSE PRACTITIONER

## 2020-03-09 RX ORDER — PREDNISONE 50 MG/1
50 TABLET ORAL DAILY
Qty: 5 TABLET | Refills: 0 | Status: SHIPPED | OUTPATIENT
Start: 2020-03-09 | End: 2020-06-12 | Stop reason: SDUPTHER

## 2020-03-09 RX ORDER — METHYLPREDNISOLONE ACETATE 80 MG/ML
80 INJECTION, SUSPENSION INTRA-ARTICULAR; INTRALESIONAL; INTRAMUSCULAR; SOFT TISSUE ONCE
Status: COMPLETED | OUTPATIENT
Start: 2020-03-09 | End: 2020-03-09

## 2020-03-09 RX ORDER — KETOROLAC TROMETHAMINE 30 MG/ML
60 INJECTION, SOLUTION INTRAMUSCULAR; INTRAVENOUS ONCE
Status: COMPLETED | OUTPATIENT
Start: 2020-03-09 | End: 2020-03-09

## 2020-03-09 RX ADMIN — KETOROLAC TROMETHAMINE 60 MG: 30 INJECTION, SOLUTION INTRAMUSCULAR; INTRAVENOUS at 10:28

## 2020-03-09 RX ADMIN — METHYLPREDNISOLONE ACETATE 80 MG: 80 INJECTION, SUSPENSION INTRA-ARTICULAR; INTRALESIONAL; INTRAMUSCULAR; SOFT TISSUE at 10:28

## 2020-03-09 ASSESSMENT — PATIENT HEALTH QUESTIONNAIRE - PHQ9
SUM OF ALL RESPONSES TO PHQ QUESTIONS 1-9: 0
SUM OF ALL RESPONSES TO PHQ QUESTIONS 1-9: 0
SUM OF ALL RESPONSES TO PHQ9 QUESTIONS 1 & 2: 0
1. LITTLE INTEREST OR PLEASURE IN DOING THINGS: 0
2. FEELING DOWN, DEPRESSED OR HOPELESS: 0

## 2020-03-09 ASSESSMENT — ENCOUNTER SYMPTOMS
SHORTNESS OF BREATH: 0
TROUBLE SWALLOWING: 0
VOMITING: 0
WHEEZING: 0
BLOOD IN STOOL: 0
SINUS PRESSURE: 0
SORE THROAT: 0
ABDOMINAL PAIN: 0
CONSTIPATION: 0
NAUSEA: 0
COUGH: 0
DIARRHEA: 0
BACK PAIN: 1

## 2020-03-09 NOTE — PROGRESS NOTES
[facility] PRIMARY [facility]  [facility] [address] [facility] [address]  [address] [address]  Dept: [phone]  Dept Fax: [phone]    Kyleigh Ortiz is a 77 y.o. female who presentstoday for her medical conditions/complaints as noted below. Kyleigh Ortiz is c/o of  Chief Complaint   Patient presents with    Hypertension     3 month follow up     Back Pain     siatica          HPI:     Here to day for follow up  Reports has been experiencing a flare of her chronic back pain with left sided sciatica over the past month  Reports has not been a significant problem for quite some time but over the past 3 to 4 weeks she has been having a lot of pain  The pain is in the lower back and radiates down her left leg  It is particularly bad with prolonged sitting  Reports when she is doing her tube feeds where she is supposed to be sitting for a couple of hours at a time she cannot tolerate it, she has been forced to stand up and lean against the couch  In the past she has done physical therapy for her back pain and she has resumed some of those exercises at home with minimal improvement  Has been using Motrin 600 mg 3-4 times daily with minimal relief  Has also tried lidocaine patches which do help a little bit    Continues to see bariatric surgeon regularly for management of her J-tube    Reports had recent iron infusions which has helped her energy levels significantly    Hypertension   This is a chronic problem. The current episode started more than 1 year ago. The problem is controlled. Pertinent negatives include no chest pain, headaches, palpitations, peripheral edema or shortness of breath. Past treatments include calcium channel blockers. The current treatment provides significant improvement. There are no compliance problems. There is no history of CAD/MI or CVA.        No results found for: LABA1C          ( goal A1C is < 7)   No results found for: LABMICR  LDL Cholesterol (mg/dL) DO at [address]  2005    done to help gastroparesis fr vagus nerve injury   [facility],  [address] TUNNELED VENOUS PORT PLACEMENT  2004    REMOVED 2 YRS LATER    UPPER GASTROINTESTINAL ENDOSCOPY  1993-2012    X 15 SOME WITH BX., SOME FOR DILATATION       Family History   Problem Relation Age of Onset    Hypertension Mother     Heart Disease Paternal Grandfather     Stroke Father     Cancer Brother         KIDNEY AND PROSTATE    Colon Cancer Maternal Uncle     Cancer Maternal Grandmother         NON HODGINS LYMPHOMA    Cancer Maternal Grandfather         LUNG AND ESOPHAGEAL          Social History     Tobacco Use    Smoking status: Never Smoker    Smokeless tobacco: Never Used   Substance Use Topics    Alcohol use: No      Current Outpatient Medications   Medication Sig Dispense Refill    predniSONE (DELTASONE) 50 MG tablet Take 1 tablet by mouth daily for 5 days 5 tablet 0    cephALEXin (KEFLEX) 250 MG capsule take 2 capsules by mouth twice a day 14 capsule 1    LORazepam (ATIVAN) 1 MG tablet take 1 tablet by mouth every evening 90 tablet 0    ibuprofen (ADVIL;MOTRIN) 600 MG tablet TAKE 1 TABLET BY MOUTH  EVERY 8 HOURS AS NEEDED FOR PAIN 270 tablet 2    sertraline (ZOLOFT) 100 MG tablet TAKE 1 AND 1/2 TABLETS BY  MOUTH DAILY 135 tablet 3    butalbital-acetaminophen-caffeine (FIORICET, ESGIC) -40 MG per tablet Take 1 tablet by mouth every 6 hours as needed for Headaches 90 tablet 1    gabapentin (NEURONTIN) 300 MG capsule Take 1 capsule by mouth 2 times daily for 180 days. (Patient taking differently: Take 300 mg by mouth daily.  ) 180 capsule 3    loperamide (IMODIUM) 2 MG capsule Take 1 capsule by mouth 4 times daily as needed for Diarrhea 360 capsule 3    SYRINGE-NEEDLE, DISP, 3 ML (B-D 3CC LUER-JUSTIN SYR 21GX1\") 21G X 1\" 3 ML MISC Use as directed with vitamin b every month 100 each 3    ZINC OXIDE, TOPICAL, 25 % OINT Apply 10 mLs topically 2 times daily 480 g 0    cyanocobalamin 1000 MCG/ML injection inject 1 milliliter intramuscularly EVERY 1ST OF THE MONTH 10 mL 2    clotrimazole-betamethasone (LOTRISONE) 1-0.05 % cream apply topically to affected area twice a day 1 Tube 0    Elastic Bandages & Supports (MEDICAL COMPRESSION STOCKINGS) MISC Knee high 20 -30 mm hg 1 each 2    lactobacillus (BACID) TABS take 1 tablet by mouth three times a day 180 tablet 3    nystatin (MYCOSTATIN) 308620 UNIT/GM ointment Apply topically 2 times daily. 30 g 2    diltiazem (CARDIZEM) 60 MG tablet Take 90 mg by mouth 2 times daily       Nutritional Supplements (PEPTAMEN AF) LIQD Take by mouth Indications: TUBE FEED 1500 CALORIES A DAY      ondansetron (ZOFRAN) 8 MG tablet   Take 8 mg by mouth every 8 hours as needed       EPINEPHrine 0.3 MG/0.3ML SOAJ injection Inject 0.3 mg into the muscle.  Immune Globulin, Human, (HIZENTRA) 10 GM/50ML SOLN   Inject 10 g into the skin every 7 days THURSDAYS      Cholecalciferol (VITAMIN D-3) 5000 UNITS TABS Take by mouth daily       Diphenoxylate-Atropine (LOMOTIL PO) Take  by mouth as needed.  flecainide (TAMBOCOR) 50 MG tablet Take 50 mg by mouth daily.  dicyclomine (BENTYL) 20 MG tablet Take 20 mg by mouth every 6 hours as needed. No current facility-administered medications for this visit.       Allergies   Allergen Reactions    Macrodantin [Nitrofurantoin Macrocrystal] Hives    Compazine [Prochlorperazine] Other (See Comments)     HYPER    Phenergan [Promethazine Hcl] Other (See Comments)     HYPER    Reglan [Metoclopramide] Other (See Comments)     HYPER, AGGITATED      Sulfa Antibiotics Rash    Vancomycin Other (See Comments)     HALLUCINATON    Vfend [Voriconazole] Other (See Comments)     hallucinations       Health Maintenance   Topic Date Due    Annual Wellness Visit (AWV)  12/09/2021 (Originally 6/17/2019)    Potassium monitoring  12/09/2020    Creatinine monitoring  12/09/2020    Breast cancer Pulmonary:      Effort: Pulmonary effort is normal.      Breath sounds: Normal breath sounds. No wheezing. Abdominal:      General: Bowel sounds are normal. There is no distension. Palpations: Abdomen is soft. Musculoskeletal:      Lumbar back: She exhibits decreased range of motion and pain. Comments: Visibly uncomfortable while sitting   Skin:     General: Skin is warm and dry. Neurological:      Mental Status: She is alert and oriented to person, place, and time. Psychiatric:         Behavior: Behavior normal.         Thought Content: Thought content normal.         Judgment: Judgment normal.       BP (!) 142/80 (Site: Right Upper Arm, Position: Sitting, Cuff Size: Medium Adult)   Pulse 63   Wt 178 lb 12.8 oz (81.1 kg)   LMP  (LMP Unknown)   SpO2 96%   BMI 25.66 kg/m²     Assessment:       Diagnosis Orders   1. Essential hypertension     2. Chronic midline low back pain with left-sided sciatica  methylPREDNISolone acetate (DEPO-MEDROL) injection 80 mg    ketorolac (TORADOL) injection 60 mg    XR LUMBAR SPINE (MIN 4 VIEWS)    predniSONE (DELTASONE) 50 MG tablet   3. Anxiety     4. Primary insomnia     5. Major depressive disorder with single episode, in partial remission ([facility] [address]               Plan:      Return in about 6 months (around 9/9/2020). Hypertension-stable and well-controlled on current medication  Back pain- Depo-Medrol and Toradol injections, short course oral steroids starting tomorrow, stop Motrin while on prednisone, continue lidocaine patches, heat/ice. Check lumbar x-ray as has not had any recent imaging.   Discussed referral to physical therapy if does not improve, she will continue her home PT exercises that she has learned in the past  Depression, insomnia- unchanged, continue current medications  Orders Placed This Encounter   Procedures    XR LUMBAR SPINE (MIN 4 VIEWS)     Standing Status:   Future     Standing Expiration Date:   3/9/2021     Order Specific Question: Reason for exam:     Answer:   back pain        Orders Placed This Encounter   Medications    methylPREDNISolone acetate (DEPO-MEDROL) injection 80 mg    ketorolac (TORADOL) injection 60 mg    predniSONE (DELTASONE) 50 MG tablet     Sig: Take 1 tablet by mouth daily for 5 days     Dispense:  5 tablet     Refill:  0       Patient given educational materials - see patient instructions. Discussed use, benefit, and side effects of prescribed medications. All patientquestions answered. Pt voiced understanding. Reviewed health maintenance. Instructedto continue current medications, diet and exercise. Patient agreed with treatmentplan. Follow up as directed.      Electronicallysigned by TRAY De La Vega CNP on 3/9/2020 at 1:14 PM

## 2020-03-10 ENCOUNTER — HOSPITAL ENCOUNTER (OUTPATIENT)
Facility: CLINIC | Age: 70
Discharge: HOME | End: 2020-03-12
Payer: MEDICARE

## 2020-03-10 ENCOUNTER — HOSPITAL ENCOUNTER (OUTPATIENT)
Dept: GENERAL RADIOLOGY | Facility: CLINIC | Age: 70
Discharge: HOME | End: 2020-03-12
Payer: MEDICARE

## 2020-03-10 PROCEDURE — 72110 X-RAY EXAM L-2 SPINE 4/>VWS: CPT

## 2020-03-25 PROBLEM — D83.9 CVID (COMMON VARIABLE IMMUNODEFICIENCY) (HCC): Status: RESOLVED | Noted: 2020-03-25 | Resolved: 2020-03-24

## 2020-04-29 ENCOUNTER — TELEPHONE (OUTPATIENT)
Dept: PRIMARY CARE CLINIC | Age: 70
End: 2020-04-29

## 2020-04-29 NOTE — TELEPHONE ENCOUNTER
Left message letting patient know she is due for a medicare annual wellness visit and can call the office to be scheduled.

## 2020-05-27 ENCOUNTER — HOSPITAL ENCOUNTER (OUTPATIENT)
Facility: MEDICAL CENTER | Age: 70
End: 2020-05-27
Payer: MEDICARE

## 2020-06-02 ENCOUNTER — HOSPITAL ENCOUNTER (OUTPATIENT)
Age: 70
Discharge: HOME | End: 2020-06-02
Payer: MEDICARE

## 2020-06-02 LAB
ABSOLUTE EOS #: 0.16 K/UL (ref 0–0.44)
ABSOLUTE IMMATURE GRANULOCYTE: 0.01 K/UL (ref 0–0.3)
ABSOLUTE LYMPH #: 1.46 K/UL (ref 1.1–3.7)
ABSOLUTE MONO #: 0.42 K/UL (ref 0.1–1.2)
BASOPHILS # BLD: 1 % (ref 0–2)
BASOPHILS ABSOLUTE: 0.05 K/UL (ref 0–0.2)
DIFFERENTIAL TYPE: NORMAL
EOSINOPHILS RELATIVE PERCENT: 3 % (ref 1–4)
FERRITIN: 308 UG/L (ref 13–150)
HCT VFR BLD CALC: 41.1 % (ref 36.3–47.1)
HEMOGLOBIN: 13.8 G/DL (ref 11.9–15.1)
IMMATURE GRANULOCYTES: 0 %
LYMPHOCYTES # BLD: 24 % (ref 24–43)
MCH RBC QN AUTO: 33.4 PG (ref 25.2–33.5)
MCHC RBC AUTO-ENTMCNC: 33.6 G/DL (ref 28.4–34.8)
MCV RBC AUTO: 99.5 FL (ref 82.6–102.9)
MONOCYTES # BLD: 7 % (ref 3–12)
NRBC AUTOMATED: 0 PER 100 WBC
PDW BLD-RTO: 13.3 % (ref 11.8–14.4)
PLATELET # BLD: 253 K/UL (ref 138–453)
PLATELET ESTIMATE: NORMAL
PMV BLD AUTO: 10.9 FL (ref 8.1–13.5)
RBC # BLD: 4.13 M/UL (ref 3.95–5.11)
RBC # BLD: NORMAL 10*6/UL
SEG NEUTROPHILS: 65 % (ref 36–65)
SEGMENTED NEUTROPHILS ABSOLUTE COUNT: 3.99 K/UL (ref 1.5–8.1)
WBC # BLD: 6.1 K/UL (ref 3.5–11.3)
WBC # BLD: NORMAL 10*3/UL

## 2020-06-02 PROCEDURE — 36415 COLL VENOUS BLD VENIPUNCTURE: CPT

## 2020-06-02 PROCEDURE — 85025 COMPLETE CBC W/AUTO DIFF WBC: CPT

## 2020-06-02 PROCEDURE — 82728 ASSAY OF FERRITIN: CPT

## 2020-06-08 ENCOUNTER — OFFICE VISIT (OUTPATIENT)
Dept: ONCOLOGY | Age: 70
End: 2020-06-08
Payer: MEDICARE

## 2020-06-08 ENCOUNTER — HOSPITAL ENCOUNTER (OUTPATIENT)
Facility: MEDICAL CENTER | Age: 70
Discharge: HOME | End: 2020-06-08
Payer: MEDICARE

## 2020-06-08 ENCOUNTER — TELEPHONE (OUTPATIENT)
Dept: ONCOLOGY | Age: 70
End: 2020-06-08

## 2020-06-08 VITALS
TEMPERATURE: 97.7 F | WEIGHT: 180.4 LBS | DIASTOLIC BLOOD PRESSURE: 80 MMHG | BODY MASS INDEX: 25.88 KG/M2 | SYSTOLIC BLOOD PRESSURE: 143 MMHG | HEART RATE: 61 BPM

## 2020-06-08 DIAGNOSIS — K90.9 IRON MALABSORPTION: ICD-10-CM

## 2020-06-08 DIAGNOSIS — D50.8 OTHER IRON DEFICIENCY ANEMIA: ICD-10-CM

## 2020-06-08 PROCEDURE — 1090F PRES/ABSN URINE INCON ASSESS: CPT | Performed by: INTERNAL MEDICINE

## 2020-06-08 PROCEDURE — G8417 CALC BMI ABV UP PARAM F/U: HCPCS | Performed by: INTERNAL MEDICINE

## 2020-06-08 PROCEDURE — G8399 PT W/DXA RESULTS DOCUMENT: HCPCS | Performed by: INTERNAL MEDICINE

## 2020-06-08 PROCEDURE — 1036F TOBACCO NON-USER: CPT | Performed by: INTERNAL MEDICINE

## 2020-06-08 PROCEDURE — 4040F PNEUMOC VAC/ADMIN/RCVD: CPT | Performed by: INTERNAL MEDICINE

## 2020-06-08 PROCEDURE — 3017F COLORECTAL CA SCREEN DOC REV: CPT | Performed by: INTERNAL MEDICINE

## 2020-06-08 PROCEDURE — 99214 OFFICE O/P EST MOD 30 MIN: CPT | Performed by: INTERNAL MEDICINE

## 2020-06-08 PROCEDURE — 1123F ACP DISCUSS/DSCN MKR DOCD: CPT | Performed by: INTERNAL MEDICINE

## 2020-06-08 PROCEDURE — G8427 DOCREV CUR MEDS BY ELIG CLIN: HCPCS | Performed by: INTERNAL MEDICINE

## 2020-06-08 PROCEDURE — 99211 OFF/OP EST MAY X REQ PHY/QHP: CPT

## 2020-06-08 RX ORDER — PANTOPRAZOLE SODIUM 40 MG/1
TABLET, DELAYED RELEASE ORAL 2 TIMES DAILY
COMMUNITY
Start: 2020-06-02

## 2020-06-09 ENCOUNTER — HOSPITAL ENCOUNTER (OUTPATIENT)
Dept: MAMMOGRAPHY | Age: 70
Discharge: HOME | End: 2020-06-11
Payer: MEDICARE

## 2020-06-09 PROCEDURE — 77063 BREAST TOMOSYNTHESIS BI: CPT

## 2020-06-12 RX ORDER — PREDNISONE 50 MG/1
50 TABLET ORAL DAILY
Qty: 5 TABLET | Refills: 0 | Status: SHIPPED | OUTPATIENT
Start: 2020-06-12 | End: 2020-06-17

## 2020-06-25 ENCOUNTER — PATIENT MESSAGE (OUTPATIENT)
Dept: PRIMARY CARE CLINIC | Age: 70
End: 2020-06-25

## 2020-06-26 RX ORDER — LORAZEPAM 1 MG/1
TABLET ORAL
Qty: 90 TABLET | Refills: 0 | Status: SHIPPED | OUTPATIENT
Start: 2020-06-26 | End: 2020-09-17 | Stop reason: SDUPTHER

## 2020-06-26 NOTE — TELEPHONE ENCOUNTER
Gastrointestinal tube present [facility])     H/O sepsis     Essential hypertension     Primary insomnia     Adverse drug effect     Dry mouth     Family history of other condition     Feeding difficulties     Cellulitis     CVID (common variable immunodeficiency) ([facility]     Hypogammaglobulinemia ([facility]     Gastrocutaneous fistula due to gastrostomy tube     Dehydration     Postprocedural non-healing wound     Gastrostomy complication, unspecified (HCC)     Dysphagia     Gastrocutaneous fistula     Neuropathy     Chronic diarrhea     PEG (percutaneous endoscopic gastrostomy) status (HCC)     Iron deficiency anemia     Iron malabsorption

## 2020-07-14 RX ORDER — GABAPENTIN 300 MG/1
CAPSULE ORAL
Qty: 180 CAPSULE | Refills: 3 | Status: SHIPPED | OUTPATIENT
Start: 2020-07-14 | End: 2020-09-17 | Stop reason: SINTOL

## 2020-07-31 ENCOUNTER — HOSPITAL ENCOUNTER (OUTPATIENT)
Age: 70
Setting detail: SPECIMEN
Discharge: HOME | End: 2020-07-31
Payer: MEDICARE

## 2020-07-31 ENCOUNTER — OFFICE VISIT (OUTPATIENT)
Dept: PRIMARY CARE CLINIC | Age: 70
End: 2020-07-31
Payer: MEDICARE

## 2020-07-31 VITALS
BODY MASS INDEX: 25.77 KG/M2 | HEART RATE: 73 BPM | HEIGHT: 70 IN | RESPIRATION RATE: 17 BRPM | OXYGEN SATURATION: 99 % | DIASTOLIC BLOOD PRESSURE: 78 MMHG | SYSTOLIC BLOOD PRESSURE: 148 MMHG | WEIGHT: 180 LBS | TEMPERATURE: 98.2 F

## 2020-07-31 PROCEDURE — 1036F TOBACCO NON-USER: CPT | Performed by: NURSE PRACTITIONER

## 2020-07-31 PROCEDURE — 3017F COLORECTAL CA SCREEN DOC REV: CPT | Performed by: NURSE PRACTITIONER

## 2020-07-31 PROCEDURE — 99213 OFFICE O/P EST LOW 20 MIN: CPT | Performed by: NURSE PRACTITIONER

## 2020-07-31 PROCEDURE — G8399 PT W/DXA RESULTS DOCUMENT: HCPCS | Performed by: NURSE PRACTITIONER

## 2020-07-31 PROCEDURE — 1090F PRES/ABSN URINE INCON ASSESS: CPT | Performed by: NURSE PRACTITIONER

## 2020-07-31 PROCEDURE — 1123F ACP DISCUSS/DSCN MKR DOCD: CPT | Performed by: NURSE PRACTITIONER

## 2020-07-31 PROCEDURE — G8427 DOCREV CUR MEDS BY ELIG CLIN: HCPCS | Performed by: NURSE PRACTITIONER

## 2020-07-31 PROCEDURE — G8417 CALC BMI ABV UP PARAM F/U: HCPCS | Performed by: NURSE PRACTITIONER

## 2020-07-31 PROCEDURE — 4040F PNEUMOC VAC/ADMIN/RCVD: CPT | Performed by: NURSE PRACTITIONER

## 2020-07-31 ASSESSMENT — ENCOUNTER SYMPTOMS
DIARRHEA: 0
EYE DISCHARGE: 0
SORE THROAT: 1
COUGH: 0
EYE ITCHING: 0
EYES NEGATIVE: 1
CHEST TIGHTNESS: 0
ABDOMINAL PAIN: 0
ALLERGIC/IMMUNOLOGIC NEGATIVE: 1
SINUS PRESSURE: 1
VOMITING: 0
SHORTNESS OF BREATH: 0
NAUSEA: 1

## 2020-07-31 NOTE — PATIENT INSTRUCTIONS
Advance Care Planning  People with COVID-19 may have no symptoms, mild symptoms, such as fever, cough, and shortness of breath or they may have more severe illness, developing severe and fatal pneumonia. As a result, Advance Care Planning with attention to naming a health care decision maker (someone you trust to make healthcare decisions for you if you could not speak for yourself) and sharing other health care preferences is important BEFORE a possible health crisis. Please contact your Primary Care Provider to discuss Advance Care Planning. Preventing the Spread of Coronavirus Disease 2019 in Homes and Residential Communities  For the most recent information go to [email or web address]    Prevention steps for People with confirmed or suspected COVID-19 (including persons under investigation) who do not need to be hospitalized  and   People with confirmed COVID-19 who were hospitalized and determined to be medically stable to go home    Your healthcare provider and public health staff will evaluate whether you can be cared for at home. If it is determined that you do not need to be hospitalized and can be isolated at home, you will be monitored by staff from your local or state health department. You should follow the prevention steps below until a healthcare provider or local or state health department says you can return to your normal activities. Stay home except to get medical care  People who are mildly ill with COVID-19 are able to isolate at home during their illness. You should restrict activities outside your home, except for getting medical care. Do not go to work, school, or public areas. Avoid using public transportation, ride-sharing, or taxis. Separate yourself from other people and animals in your home  People: As much as possible, you should stay in a specific room and away from other people in your home.  Also, you should use a separate before eating or preparing food. If soap and water are not readily available, use an alcohol-based hand  with at least 60% alcohol, covering all surfaces of your hands and rubbing them together until they feel dry. Soap and water are the best option if hands are visibly dirty. Avoid touching your eyes, nose, and mouth with unwashed hands. Avoid sharing personal household items  You should not share dishes, drinking glasses, cups, eating utensils, towels, or bedding with other people or pets in your home. After using these items, they should be washed thoroughly with soap and water. Clean all high-touch surfaces everyday  High touch surfaces include counters, tabletops, doorknobs, bathroom fixtures, toilets, phones, keyboards, tablets, and bedside tables. Also, clean any surfaces that may have blood, stool, or body fluids on them. Use a household cleaning spray or wipe, according to the label instructions. Labels contain instructions for safe and effective use of the cleaning product including precautions you should take when applying the product, such as wearing gloves and making sure you have good ventilation during use of the product. Monitor your symptoms  Seek prompt medical attention if your illness is worsening (e.g., difficulty breathing). Before seeking care, call your healthcare provider and tell them that you have, or are being evaluated for, COVID-19. Put on a facemask before you enter the facility. These steps will help the healthcare providers office to keep other people in the office or waiting room from getting infected or exposed. Ask your healthcare provider to call the local or state health department. Persons who are placed under active monitoring or facilitated self-monitoring should follow instructions provided by their local health department or occupational health professionals, as appropriate. When working with your local health department check their available hours.   If you have a medical emergency and need to call 911, notify the dispatch personnel that you have, or are being evaluated for COVID-19. If possible, put on a facemask before emergency medical services arrive. Discontinuing home isolation  Patients with confirmed COVID-19 should remain under home isolation precautions until the risk of secondary transmission to others is thought to be low. The decision to discontinue home isolation precautions should be made on a case-by-case basis, in consultation with healthcare providers and state and local health departments.

## 2020-07-31 NOTE — PROGRESS NOTES
[facility]  [facility]  [address] Dayne Carbajal  [address] [address]  Dept: [phone]  Dept Fax: [phone]    Emil Holder is a 77 y.o. female who presents today forher medical conditions/complaints as noted below. Emil Holder is c/o of   Chief Complaint   Patient presents with    Sweats    Headache    Nausea    Diarrhea    Fatigue    Generalized Body Aches    Anorexia    Fever    Pharyngitis    Nasal Congestion       HPI:     Headache    This is a new problem. The current episode started in the past 7 days (x's 4 days ). The problem occurs constantly. The pain radiates to the left neck. Associated symptoms include anorexia, a fever, nausea, sinus pressure and a sore throat. Pertinent negatives include no abdominal pain, coughing, dizziness, neck pain, vomiting or weakness. Associated symptoms comments: Nasal congestion, . Nothing aggravates the symptoms. Denies any known exposure to Covid.         Past Medical History:   Diagnosis Date    Abdominal pain     Anxiety     CVID (common variable immunodeficiency) (HCC)     Depression     Diarrhea     Difficulty swallowing     Dizziness     Fibromyalgia     Gastroparesis     Headache     History of blood transfusion     Hypogammaglobulinaemia, unspecified 2013    Irritable bowel syndrome     Jejunostomy tube present ([address]     USES TO FEED SELF , PEPTAMEN AF 1500 BESSY A DAY    Nausea & vomiting     Numbness     Postprocedural non-healing wound 6/26/2019    RLS (restless legs syndrome)     S/P percutaneous endoscopic gastrostomy (PEG) tube placement ([address]     USES TO DRAIN STOMACH    Sepsis ([address] 06/2006    Snores     SVT (supraventricular tachycardia) ([address] 2006    ON RX    Wears glasses     Wears glasses       Past Surgical History:   Procedure Laterality Date    ABSCESS DRAINAGE  03/08/2018     near peg tube- local    BREAST BIOPSY Left 8/13/15    BUNIONECTOMY Right 1985 FOOT    CHOLECYSTECTOMY  1972    ENDOMETRIAL ABLATION  2005    GASTRIC FUNDOPLICATION  6169    sx that injured the vagus nerves and caused gastroparesis    GASTROSTOMY TUBE PLACEMENT  2004    for stomach drainage    GASTROSTOMY TUBE PLACEMENT N/A 8/19/2019    EGD WITH GASTROSTOMY TUBE PLACEMENT - GI SCHEDULED performed by Cesario Stewart MD at [address]  2005    J-tube for feeding    KNEE SURGERY Right 1986    tumor    [place] OFFICE/OUTPT VISIT,PROCEDURE ONLY N/A 3/8/2018    INCISION AND DRAINAGE ABSCESS NEAR PEG TUBE performed by Roslyn Antoine IV, DO at [address]  2005    done to help gastroparesis fr vagus nerve injury   [facility]  [address] TUNNELED VENOUS PORT PLACEMENT  2004    REMOVED 2 YRS LATER    UPPER GASTROINTESTINAL ENDOSCOPY  1993-2012    X 15 SOME WITH BX., SOME FOR DILATATION       Family History   Problem Relation Age of Onset    Hypertension Mother     Heart Disease Paternal Grandfather     Stroke Father     Cancer Brother         KIDNEY AND PROSTATE    Colon Cancer Maternal Uncle     Cancer Maternal Grandmother         NON HODGINS LYMPHOMA    Cancer Maternal Grandfather         LUNG AND ESOPHAGEAL       Social History     Tobacco Use    Smoking status: Never Smoker    Smokeless tobacco: Never Used   Substance Use Topics    Alcohol use: No      Current Outpatient Medications   Medication Sig Dispense Refill    gabapentin (NEURONTIN) 300 MG capsule TAKE 1 CAPSULE BY MOUTH 2  TIMES DAILY 180 capsule 3    LORazepam (ATIVAN) 1 MG tablet take 1 tablet by mouth every evening 90 tablet 0    pantoprazole (PROTONIX) 40 MG tablet 2 times daily      ibuprofen (ADVIL;MOTRIN) 600 MG tablet TAKE 1 TABLET BY MOUTH  EVERY 8 HOURS AS NEEDED FOR PAIN 270 tablet 2    sertraline (ZOLOFT) 100 MG tablet TAKE 1 AND 1/2 TABLETS BY  MOUTH DAILY (Patient taking differently: Taking one tablet daily  6/8/20) 135 tablet 3    butalbital-acetaminophen-caffeine (FIORICET, ESGIC) -40 MG per tablet Take 1 tablet by mouth every 6 hours as needed for Headaches 90 tablet 1    loperamide (IMODIUM) 2 MG capsule Take 1 capsule by mouth 4 times daily as needed for Diarrhea 360 capsule 3    SYRINGE-NEEDLE, DISP, 3 ML (B-D 3CC LUER-JUSTIN SYR 21GX1\") 21G X 1\" 3 ML MISC Use as directed with vitamin b every month 100 each 3    ZINC OXIDE, TOPICAL, 25 % OINT Apply 10 mLs topically 2 times daily 480 g 0    cyanocobalamin 1000 MCG/ML injection inject 1 milliliter intramuscularly EVERY 1ST OF THE MONTH 10 mL 2    clotrimazole-betamethasone (LOTRISONE) 1-0.05 % cream apply topically to affected area twice a day 1 Tube 0    nystatin (MYCOSTATIN) 909462 UNIT/GM ointment Apply topically 2 times daily. 30 g 2    diltiazem (CARDIZEM) 60 MG tablet Take 90 mg by mouth 3 times daily       Nutritional Supplements (PEPTAMEN AF) LIQD Take by mouth Indications: TUBE FEED 1500 CALORIES A DAY      ondansetron (ZOFRAN) 8 MG tablet   Take 8 mg by mouth every 8 hours as needed       EPINEPHrine 0.3 MG/0.3ML SOAJ injection Inject 0.3 mg into the muscle.  Immune Globulin, Human, (HIZENTRA) 10 GM/50ML SOLN   Inject 10 g into the skin every 7 days THURSDAYS      Cholecalciferol (VITAMIN D-3) 5000 UNITS TABS Take by mouth daily       Diphenoxylate-Atropine (LOMOTIL PO) Take  by mouth as needed.  flecainide (TAMBOCOR) 50 MG tablet Take 50 mg by mouth daily.  dicyclomine (BENTYL) 20 MG tablet Take 20 mg by mouth every 6 hours as needed. No current facility-administered medications for this visit.          Allergies   Allergen Reactions    Macrodantin [Nitrofurantoin Macrocrystal] Hives    Compazine [Prochlorperazine] Other (See Comments)     HYPER    Phenergan [Promethazine Hcl] Other (See Comments)     HYPER    Reglan [Metoclopramide] Other (See Comments)     HYPER, AGGITATED      Sulfa Antibiotics Rash    Vancomycin Other (See Comments) HALLUCINATON    Vfend [Voriconazole] Other (See Comments)     hallucinations           Subjective:      Review of Systems   Constitutional: Positive for fever. Negative for appetite change, chills and fatigue. HENT: Positive for sinus pressure and sore throat. Negative for congestion and postnasal drip. Eyes: Negative. Negative for discharge and itching. Respiratory: Negative for cough, chest tightness and shortness of breath. Cardiovascular: Negative for chest pain, palpitations and leg swelling. Gastrointestinal: Positive for anorexia and nausea. Negative for abdominal pain, diarrhea and vomiting. Endocrine: Negative. Genitourinary: Negative for dysuria, frequency and urgency. Musculoskeletal: Negative for neck pain and neck stiffness. Skin: Negative for rash. Allergic/Immunologic: Negative. Neurological: Positive for headaches. Negative for dizziness, weakness and light-headedness. Hematological: Negative for adenopathy. Psychiatric/Behavioral: Negative for confusion. Objective:      Physical Exam  Vitals signs reviewed. Constitutional:       Appearance: She is well-developed. HENT:      Head: Normocephalic. Right Ear: External ear normal.      Left Ear: External ear normal.      Nose: Nose normal.   Eyes:      Conjunctiva/sclera: Conjunctivae normal.      Pupils: Pupils are equal, round, and reactive to light. Neck:      Musculoskeletal: Normal range of motion and neck supple. Cardiovascular:      Rate and Rhythm: Normal rate and regular rhythm. Heart sounds: Normal heart sounds. No murmur. No friction rub. No gallop. Pulmonary:      Effort: Pulmonary effort is normal. No respiratory distress. Breath sounds: Normal breath sounds. Abdominal:      General: Bowel sounds are normal.      Palpations: Abdomen is soft. Musculoskeletal: Normal range of motion. Lymphadenopathy:      Cervical: No cervical adenopathy.    Skin:     General: Skin is warm and dry. Findings: No rash. Neurological:      Mental Status: She is alert and oriented to person, place, and time. Cranial Nerves: No cranial nerve deficit. Coordination: Coordination normal.      Deep Tendon Reflexes: Reflexes are normal and symmetric. Psychiatric:         Thought Content: Thought content normal.       BP (!) 148/78 (Site: Left Upper Arm, Position: Sitting, Cuff Size: Medium Adult)   Pulse 73   Temp 98.2 °F (36.8 °C) (Temporal)   Resp 17   Ht 5' 10\" (1.778 m)   Wt 180 lb (81.6 kg)   LMP  (LMP Unknown)   SpO2 99%   BMI 25.83 kg/m²     Assessment:       Diagnosis Orders   1. Suspected COVID-19 virus infection  COVID-19 Ambulatory   2. Viral upper respiratory tract infection  COVID-19 Ambulatory           Plan:     1.) Covid swab obtained and sent to lab- will call with results   2.) Quarantine while waiting for Covid results   3.) Symptom management encouraged   4.) Follow-up with PCP PRN     Advance Care Planning  People with COVID-19 may have no symptoms, mild symptoms, such as fever, cough, and shortness of breath or they may have more severe illness, developing severe and fatal pneumonia. As a result, Advance Care Planning with attention to naming a health care decision maker (someone you trust to make healthcare decisions for you if you could not speak for yourself) and sharing other health care preferences is important BEFORE a possible health crisis. Please contact your Primary Care Provider to discuss Advance Care Planning.     Preventing the Spread of Coronavirus Disease 2019 in Homes and Residential Communities  For the most recent information go to [employer]    Prevention steps for People with confirmed or suspected COVID-19 (including persons under investigation) who do not need to be hospitalized  and   People with confirmed COVID-19 who were hospitalized and determined to be medically people from getting infected or exposed. Wear a facemask  You should wear a facemask when you are around other people (e.g., sharing a room or vehicle) or pets and before you enter a healthcare providers office. If you are not able to wear a facemask (for example, because it causes trouble breathing), then people who live with you should not stay in the same room with you, or they should wear a facemask if they enter your room. Cover your coughs and sneezes  Cover your mouth and nose with a tissue when you cough or sneeze. Throw used tissues in a lined trash can. Immediately wash your hands with soap and water for at least 20 seconds or, if soap and water are not available, clean your hands with an alcohol-based hand  that contains at least 60% alcohol. Clean your hands often  Wash your hands often with soap and water for at least 20 seconds, especially after blowing your nose, coughing, or sneezing; going to the bathroom; and before eating or preparing food. If soap and water are not readily available, use an alcohol-based hand  with at least 60% alcohol, covering all surfaces of your hands and rubbing them together until they feel dry. Soap and water are the best option if hands are visibly dirty. Avoid touching your eyes, nose, and mouth with unwashed hands. Avoid sharing personal household items  You should not share dishes, drinking glasses, cups, eating utensils, towels, or bedding with other people or pets in your home. After using these items, they should be washed thoroughly with soap and water. Clean all high-touch surfaces everyday  High touch surfaces include counters, tabletops, doorknobs, bathroom fixtures, toilets, phones, keyboards, tablets, and bedside tables. Also, clean any surfaces that may have blood, stool, or body fluids on them. Use a household cleaning spray or wipe, according to the label instructions.  Labels contain instructions for safe and effective use of the cleaning product including precautions you should take when applying the product, such as wearing gloves and making sure you have good ventilation during use of the product. Monitor your symptoms  Seek prompt medical attention if your illness is worsening (e.g., difficulty breathing). Before seeking care, call your healthcare provider and tell them that you have, or are being evaluated for, COVID-19. Put on a facemask before you enter the facility. These steps will help the healthcare providers office to keep other people in the office or waiting room from getting infected or exposed. Ask your healthcare provider to call the local or state health department. Persons who are placed under active monitoring or facilitated self-monitoring should follow instructions provided by their local health department or occupational health professionals, as appropriate. When working with your local health department check their available hours. If you have a medical emergency and need to call 911, notify the dispatch personnel that you have, or are being evaluated for COVID-19. If possible, put on a facemask before emergency medical services arrive. Discontinuing home isolation  Patients with confirmed COVID-19 should remain under home isolation precautions until the risk of secondary transmission to others is thought to be low. The decision to discontinue home isolation precautions should be made on a case-by-case basis, in consultation with healthcare providers and [facility] and local health departments. Problem List     None           Patient given educationalmaterials - see patient instructions. Discussed use, benefit, and side effectsof prescribed medications. All patient questions answered. Pt verbalized understanding. Instructed to continue current medications, diet and exercise. Patient agreedwith treatment plan. Follow up as directed.      Electronically signed by TRAY Foster CNP on 7/31/2020 at 12:10 PM

## 2020-08-03 LAB — SARS-COV-2, NAA: NOT DETECTED

## 2020-08-12 ENCOUNTER — OFFICE VISIT (OUTPATIENT)
Dept: ORTHOPEDIC SURGERY | Age: 70
End: 2020-08-12
Payer: MEDICARE

## 2020-08-12 PROCEDURE — 4040F PNEUMOC VAC/ADMIN/RCVD: CPT | Performed by: ORTHOPAEDIC SURGERY

## 2020-08-12 PROCEDURE — G8399 PT W/DXA RESULTS DOCUMENT: HCPCS | Performed by: ORTHOPAEDIC SURGERY

## 2020-08-12 PROCEDURE — 99203 OFFICE O/P NEW LOW 30 MIN: CPT | Performed by: ORTHOPAEDIC SURGERY

## 2020-08-12 PROCEDURE — 1036F TOBACCO NON-USER: CPT | Performed by: ORTHOPAEDIC SURGERY

## 2020-08-12 PROCEDURE — 1123F ACP DISCUSS/DSCN MKR DOCD: CPT | Performed by: ORTHOPAEDIC SURGERY

## 2020-08-12 PROCEDURE — 3017F COLORECTAL CA SCREEN DOC REV: CPT | Performed by: ORTHOPAEDIC SURGERY

## 2020-08-12 PROCEDURE — 20610 DRAIN/INJ JOINT/BURSA W/O US: CPT | Performed by: ORTHOPAEDIC SURGERY

## 2020-08-12 PROCEDURE — G8428 CUR MEDS NOT DOCUMENT: HCPCS | Performed by: ORTHOPAEDIC SURGERY

## 2020-08-12 PROCEDURE — G8417 CALC BMI ABV UP PARAM F/U: HCPCS | Performed by: ORTHOPAEDIC SURGERY

## 2020-08-12 PROCEDURE — 1090F PRES/ABSN URINE INCON ASSESS: CPT | Performed by: ORTHOPAEDIC SURGERY

## 2020-08-12 RX ORDER — BETAMETHASONE SODIUM PHOSPHATE AND BETAMETHASONE ACETATE 3; 3 MG/ML; MG/ML
12 INJECTION, SUSPENSION INTRA-ARTICULAR; INTRALESIONAL; INTRAMUSCULAR; SOFT TISSUE ONCE
Status: COMPLETED | OUTPATIENT
Start: 2020-08-12 | End: 2020-08-12

## 2020-08-12 RX ORDER — LIDOCAINE HYDROCHLORIDE 10 MG/ML
2 INJECTION, SOLUTION EPIDURAL; INFILTRATION; INTRACAUDAL; PERINEURAL ONCE
Status: COMPLETED | OUTPATIENT
Start: 2020-08-12 | End: 2020-08-12

## 2020-08-12 RX ORDER — BUPIVACAINE HYDROCHLORIDE 5 MG/ML
2 INJECTION, SOLUTION PERINEURAL ONCE
Status: COMPLETED | OUTPATIENT
Start: 2020-08-12 | End: 2020-08-12

## 2020-08-12 RX ADMIN — BETAMETHASONE SODIUM PHOSPHATE AND BETAMETHASONE ACETATE 12 MG: 3; 3 INJECTION, SUSPENSION INTRA-ARTICULAR; INTRALESIONAL; INTRAMUSCULAR; SOFT TISSUE at 13:12

## 2020-08-12 RX ADMIN — BUPIVACAINE HYDROCHLORIDE 10 MG: 5 INJECTION, SOLUTION PERINEURAL at 13:14

## 2020-08-12 RX ADMIN — LIDOCAINE HYDROCHLORIDE 2 ML: 10 INJECTION, SOLUTION EPIDURAL; INFILTRATION; INTRACAUDAL; PERINEURAL at 13:15

## 2020-08-12 NOTE — PROGRESS NOTES
Darnell Robison M.D.            [facility] [address] [facility],[address], [address] [facility]           Dept Phone: [phone]           Dept Fax:  [phone]           Kimberly Olvera          Dept Phone: [phone]           Dept Fax:  [phone]      Chief Compliant:  Chief Complaint   Patient presents with    Pain     Lt hip & knee        History of Present Illness: This is a 77 y.o. female who presents to the clinic today for evaluation / follow up of left hip and leg pain. Patient states that she has had this for prior to the [facility] worth crisis. She has difficulty ambulating sometimes. She also cannot sleep on this left side. She was possible to physical therapy for sciatica in the past but was unable to do it because of the crisis. She indicates to the area of her greater trochanter in the left gluteal area where she has most discomfort. Patient denies any injury or trauma in the past.  No prior surgical intervention. Review of Systems   Constitutional: Negative for fever, chills, sweats. Eyes: Negative for changes in vision, or pain. HENT: Negative for ear ache, epistaxis, or sore throat. Respiratory/Cardio: Negative for Chest pain, palpitations, SOB, or cough. Gastrointestinal: Negative for abdominal pain, N/V/D. Genitourinary: Negative for dysuria, frequency, urgency, or hematuria. Neurological: Negative for headache, numbness, or weakness. Integumentary: Negative for rash, itching, laceration, or abrasion. Musculoskeletal: Positive for Pain (Lt hip & knee)       Physical Exam:  Constitutional: Patient is oriented to person, place, and time. Patient appears well-developed and well nourished.    HENT: Negative otherwise noted  Head: Normocephalic and Atraumatic  Nose: Normal  Eyes: Conjunctivae and EOM are normal  Neck: Normal range of motion Neck supple. Respiratory/Cardio: Effort normal. No respiratory distress. Musculoskeletal: Examination of patient's left hip notes that she has no pain on flexion internal or external Tatian. Negative Stinchfield's. Negative logroll. She is exquisitely tender over her greater trochanter and slightly more posteriorly. She has pain going down her IT band negative straight leg raise. Neurological: Patient is alert and oriented to person, place, and time. Normal strenght. No sensory deficit. Skin: Skin is warm and dry  Psychiatric: Behavior is normal. Thought content normal.  Nursing note and vitals reviewed. Labs and Imaging:     XR taken today:  Xr Pelvis (1-2 Views)    Result Date: 8/12/2020  X-rays taken they reviewed by me show standing AP of the pelvis. Patient has very mild joint space narrowing of both hips. She does have some mild femoral acetabular impingement the left side but nothing too severe. She has no evidence of a vast necrosis. She does have prominent trochanters. No acute process noted    Xr Knee Left (1-2 Views)    Result Date: 8/12/2020  X-rays taken they reviewed by me show standing AP of both knees. Patient has very modest medial joint space narrowing of her left knee and perhaps a little bit of her right but certainly nothing too remarkable. Very slight effusions are present in both knees as well. Otherwise unremarkable        No orders of the defined types were placed in this encounter. Assessment and Plan:  1. Chronic pain of left knee    2. Hip pain, left    3       trochanteric bursitis left hip      This is a 77 y.o. female who presents to the clinic today for evaluation / follow up of trochanteric bursitis left hip.      Past History:    Current Outpatient Medications:     gabapentin (NEURONTIN) 300 MG capsule, TAKE 1 CAPSULE BY MOUTH 2  TIMES DAILY, Disp: 180 capsule, Rfl: 3    LORazepam (ATIVAN) 1 MG tablet, take 1 tablet by mouth every evening, Disp: 90 tablet, Rfl: 0    pantoprazole (PROTONIX) 40 MG tablet, 2 times daily, Disp: , Rfl:     ibuprofen (ADVIL;MOTRIN) 600 MG tablet, TAKE 1 TABLET BY MOUTH  EVERY 8 HOURS AS NEEDED FOR PAIN, Disp: 270 tablet, Rfl: 2    sertraline (ZOLOFT) 100 MG tablet, TAKE 1 AND 1/2 TABLETS BY  MOUTH DAILY (Patient taking differently: Taking one tablet daily  6/8/20), Disp: 135 tablet, Rfl: 3    butalbital-acetaminophen-caffeine (FIORICET, ESGIC) -40 MG per tablet, Take 1 tablet by mouth every 6 hours as needed for Headaches, Disp: 90 tablet, Rfl: 1    loperamide (IMODIUM) 2 MG capsule, Take 1 capsule by mouth 4 times daily as needed for Diarrhea, Disp: 360 capsule, Rfl: 3    SYRINGE-NEEDLE, DISP, 3 ML (B-D 3CC LUER-JUSTIN SYR 21GX1\") 21G X 1\" 3 ML MISC, Use as directed with vitamin b every month, Disp: 100 each, Rfl: 3    ZINC OXIDE, TOPICAL, 25 % OINT, Apply 10 mLs topically 2 times daily, Disp: 480 g, Rfl: 0    cyanocobalamin 1000 MCG/ML injection, inject 1 milliliter intramuscularly EVERY 1ST OF THE MONTH, Disp: 10 mL, Rfl: 2    clotrimazole-betamethasone (LOTRISONE) 1-0.05 % cream, apply topically to affected area twice a day, Disp: 1 Tube, Rfl: 0    nystatin (MYCOSTATIN) 203160 UNIT/GM ointment, Apply topically 2 times daily. , Disp: 30 g, Rfl: 2    diltiazem (CARDIZEM) 60 MG tablet, Take 90 mg by mouth 3 times daily , Disp: , Rfl:     Nutritional Supplements (PEPTAMEN AF) LIQD, Take by mouth Indications: TUBE FEED 1500 CALORIES A DAY, Disp: , Rfl:     ondansetron (ZOFRAN) 8 MG tablet,  Take 8 mg by mouth every 8 hours as needed , Disp: , Rfl:     EPINEPHrine 0.3 MG/0.3ML SOAJ injection, Inject 0.3 mg into the muscle., Disp: , Rfl:     Immune Globulin, Human, (HIZENTRA) 10 GM/50ML SOLN,  Inject 10 g into the skin every 7 days THURSDAYS, Disp: , Rfl:     Cholecalciferol (VITAMIN D-3) 5000 UNITS TABS, Take by mouth daily , Disp: , Rfl:     Diphenoxylate-Atropine (LOMOTIL PO), Take  by mouth as needed. , Disp: , Rfl:     flecainide (TAMBOCOR) 50 MG tablet, Take 50 mg by mouth daily. , Disp: , Rfl:     dicyclomine (BENTYL) 20 MG tablet, Take 20 mg by mouth every 6 hours as needed. , Disp: , Rfl:   Allergies   Allergen Reactions    Macrodantin [Nitrofurantoin Macrocrystal] Hives    Compazine [Prochlorperazine] Other (See Comments)     HYPER    Phenergan [Promethazine Hcl] Other (See Comments)     HYPER    Reglan [Metoclopramide] Other (See Comments)     HYPER, AGGITATED      Sulfa Antibiotics Rash    Vancomycin Other (See Comments)     HALLUCINATON    Vfend [Voriconazole] Other (See Comments)     hallucinations     Social History     Socioeconomic History    Marital status:      Spouse name: Not on file    Number of children: Not on file    Years of education: Not on file    Highest education level: Not on file   Occupational History    Not on file   Social Needs    Financial resource strain: Not on file    Food insecurity     Worry: Not on file     Inability: Not on file    Transportation needs     Medical: Not on file     Non-medical: Not on file   Tobacco Use    Smoking status: Never Smoker    Smokeless tobacco: Never Used   Substance and Sexual Activity    Alcohol use: No    Drug use: No    Sexual activity: Not on file   Lifestyle    Physical activity     Days per week: Not on file     Minutes per session: Not on file    Stress: Not on file   Relationships    Social connections     Talks on phone: Not on file     Gets together: Not on file     Attends Gnosticism service: Not on file     Active member of club or organization: Not on file     Attends meetings of clubs or organizations: Not on file     Relationship status: Not on file    Intimate partner violence     Fear of current or ex partner: Not on file     Emotionally abused: Not on file     Physically abused: Not on file     Forced sexual activity: Not on file   Other Topics Concern    Not on file   Social History Narrative    Not on file     Past Medical History:   Diagnosis Date    Abdominal pain     Anxiety     CVID (common variable immunodeficiency) ([address]     Depression     Diarrhea     Difficulty swallowing     Dizziness     Fibromyalgia     Gastroparesis     Headache     History of blood transfusion     Hypogammaglobulinaemia, unspecified 2013    Irritable bowel syndrome     Jejunostomy tube present ([address]     USES TO FEED SELF , PEPTAMEN AF 1500 BESSY A DAY    Nausea & vomiting     Numbness     Postprocedural non-healing wound 6/26/2019    RLS (restless legs syndrome)     S/P percutaneous endoscopic gastrostomy (PEG) tube placement ([address]     USES TO DRAIN STOMACH    Sepsis ([address] 06/2006    Snores     SVT (supraventricular tachycardia) ([address] 2006    ON RX    Wears glasses     Wears glasses      Past Surgical History:   Procedure Laterality Date    ABSCESS DRAINAGE  03/08/2018     near peg tube- local    BREAST BIOPSY Left 8/13/15    BUNIONECTOMY Right [address]    ENDOMETRIAL ABLATION  2005    GASTRIC FUNDOPLICATION  2160    sx that injured the vagus nerves and caused gastroparesis    GASTROSTOMY TUBE PLACEMENT  2004    for stomach drainage    GASTROSTOMY TUBE PLACEMENT N/A 8/19/2019    EGD WITH GASTROSTOMY TUBE PLACEMENT - GI SCHEDULED performed by Kamille Paul MD at [address]  2005    J-tube for feeding    KNEE SURGERY Right 1986    tumor    [place] OFFICE/OUTPT VISIT,PROCEDURE ONLY N/A 3/8/2018    INCISION AND DRAINAGE ABSCESS NEAR PEG TUBE performed by Verner Harms Canos IV, DO at [address]  2005    done to help gastroparesis fr vagus nerve injury   [address] [facility],  [address] TUNNELED VENOUS PORT PLACEMENT  2004    REMOVED 2 YRS LATER    UPPER GASTROINTESTINAL ENDOSCOPY  1993-2012    X 15 SOME WITH BX., SOME FOR DILATATION     Family History   Problem Relation Age of Onset    Hypertension Mother     Heart Disease Paternal Grandfather     Stroke Father     Cancer Brother         KIDNEY AND PROSTATE    Colon Cancer Maternal Uncle     Cancer Maternal Grandmother         NON HODGINS LYMPHOMA    Cancer Maternal Grandfather         LUNG AND ESOPHAGEAL   Plan  An informed verbal consent for the procedure was obtained and risks including, but not limited to: allergy to medications, injection, bleeding, stiffness of joint, recurrence of symptoms, loss of function, swelling, drainage, irrigation, need for surgery and pseudo-septic inflammation, were explained to the patient. Also, discussed was the potential for further injections, irrigation and debridement and surgery. Alternate means of treatment have also been discussed with the patient. Under sterile conditions the patient's left greater trochanter was injected with 2 cc of lidocaine and then Celestone into the greater trochanter. She tolerated procedure well    Patient was given a home stretching program for trochanteric bursitis/IT band syndrome. We will see her back here on a as needed basis                      Provider Attestation:  Sunil Kinsey, personally performed the services described in this documentation. All medical record entries made by the scribe were at my direction and in my presence. I have reviewed the chart and discharge instructions and agree that the records reflect my personal performance and is accurate and complete. Daryl Alvarez MD. 08/12/20      Please note that this chart was generated using voice recognition Dragon dictation software. Although every effort was made to ensure the accuracy of this automated transcription, some errors in transcription may have occurred.

## 2020-08-25 RX ORDER — CYANOCOBALAMIN 1000 UG/ML
INJECTION INTRAMUSCULAR; SUBCUTANEOUS
Qty: 10 ML | Refills: 2 | Status: SHIPPED | OUTPATIENT
Start: 2020-08-25 | End: 2021-11-08

## 2020-09-17 ENCOUNTER — OFFICE VISIT (OUTPATIENT)
Dept: PRIMARY CARE CLINIC | Age: 70
End: 2020-09-17
Payer: MEDICARE

## 2020-09-17 VITALS — SYSTOLIC BLOOD PRESSURE: 126 MMHG | BODY MASS INDEX: 26.17 KG/M2 | WEIGHT: 182.4 LBS | DIASTOLIC BLOOD PRESSURE: 74 MMHG

## 2020-09-17 PROBLEM — I47.1 SUPRAVENTRICULAR TACHYCARDIA (HCC): Status: ACTIVE | Noted: 2020-09-17

## 2020-09-17 PROBLEM — I47.10 SUPRAVENTRICULAR TACHYCARDIA: Status: ACTIVE | Noted: 2020-09-17

## 2020-09-17 PROCEDURE — 4040F PNEUMOC VAC/ADMIN/RCVD: CPT | Performed by: NURSE PRACTITIONER

## 2020-09-17 PROCEDURE — G8399 PT W/DXA RESULTS DOCUMENT: HCPCS | Performed by: NURSE PRACTITIONER

## 2020-09-17 PROCEDURE — 3017F COLORECTAL CA SCREEN DOC REV: CPT | Performed by: NURSE PRACTITIONER

## 2020-09-17 PROCEDURE — 1123F ACP DISCUSS/DSCN MKR DOCD: CPT | Performed by: NURSE PRACTITIONER

## 2020-09-17 PROCEDURE — G8427 DOCREV CUR MEDS BY ELIG CLIN: HCPCS | Performed by: NURSE PRACTITIONER

## 2020-09-17 PROCEDURE — 1090F PRES/ABSN URINE INCON ASSESS: CPT | Performed by: NURSE PRACTITIONER

## 2020-09-17 PROCEDURE — 1036F TOBACCO NON-USER: CPT | Performed by: NURSE PRACTITIONER

## 2020-09-17 PROCEDURE — 99214 OFFICE O/P EST MOD 30 MIN: CPT | Performed by: NURSE PRACTITIONER

## 2020-09-17 PROCEDURE — G8417 CALC BMI ABV UP PARAM F/U: HCPCS | Performed by: NURSE PRACTITIONER

## 2020-09-17 RX ORDER — LORAZEPAM 1 MG/1
TABLET ORAL
Qty: 90 TABLET | Refills: 0 | Status: SHIPPED | OUTPATIENT
Start: 2020-09-17 | End: 2021-01-22 | Stop reason: SDUPTHER

## 2020-09-17 RX ORDER — CEPHALEXIN 500 MG/1
500 CAPSULE ORAL 3 TIMES DAILY
Qty: 30 CAPSULE | Refills: 0 | Status: SHIPPED | OUTPATIENT
Start: 2020-09-17 | End: 2020-11-27 | Stop reason: SDUPTHER

## 2020-09-17 ASSESSMENT — ENCOUNTER SYMPTOMS
BLOOD IN STOOL: 0
SORE THROAT: 0
CONSTIPATION: 0
DIARRHEA: 0
ABDOMINAL PAIN: 0
COUGH: 0
NAUSEA: 0
SHORTNESS OF BREATH: 0
TROUBLE SWALLOWING: 0
SINUS PRESSURE: 0
WHEEZING: 0
VOMITING: 0
BACK PAIN: 1

## 2020-09-17 NOTE — PROGRESS NOTES
[facility]  [facility] [address]  [address] [address]  Dept: [phone]  Dept Fax: [phone]    Hanny Cuenca is a 72 y.o. female who presentstoday for her medical conditions/complaints as noted below. Hanny Cuenca is c/o of  Chief Complaint   Patient presents with    6 Month Follow-Up    Hypertension    Other     has concerns about J TUBE site        HPI:     Here today for follow up  Reports has been having recurrence of some infection at her j tube site  Yesterday expelled a large amount of pus  Has been using betadine swabs and neosporin topically  Historically she had several infections in the past, saw ID at that time  Has been stable for a couple years until now  She sees her GI on regular basis but her next visit is not until October    Stopped her gabapentin as started to have swelling in her ankles and hot flashes  Is managing her neuropathy and RLS with topical agents for now  Hypertension   This is a chronic problem. The current episode started more than 1 year ago. The problem is controlled. Pertinent negatives include no chest pain, headaches, palpitations, peripheral edema or shortness of breath. Past treatments include calcium channel blockers. The current treatment provides significant improvement. There are no compliance problems. There is no history of CAD/MI or CVA.        No results found for: LABA1C          ( goal A1C is < 7)   No results found for: LABMICR  LDL Cholesterol (mg/dL)   Date Value   08/15/2019 107     LDL Calculated (mg/dL)   Date Value   06/28/2018 108   09/20/2017 107       (goal LDL is <100)   AST (U/L)   Date Value   08/15/2019 23     ALT (U/L)   Date Value   08/15/2019 13     BUN (mg/dL)   Date Value   06/17/2019 32 (H)     BP Readings from Last 3 Encounters:   09/17/20 126/74   07/31/20 (!) 148/78   06/08/20 (!) 143/80          ([id number])    Past Medical History:   Diagnosis Date    Abdominal KIDNEY AND PROSTATE    Colon Cancer Maternal Uncle     Cancer Maternal Grandmother         NON HODGINS LYMPHOMA    Cancer Maternal Grandfather         LUNG AND ESOPHAGEAL          Social History     Tobacco Use    Smoking status: Never Smoker    Smokeless tobacco: Never Used   Substance Use Topics    Alcohol use: No      Current Outpatient Medications   Medication Sig Dispense Refill    cephALEXin (KEFLEX) 500 MG capsule Take 1 capsule by mouth 3 times daily 30 capsule 0    LORazepam (ATIVAN) 1 MG tablet take 1 tablet by mouth every evening 90 tablet 0    cyanocobalamin 1000 MCG/ML injection INJECT 1 MILLILITER INTRAMUSCULARLY EVERY FIRST OF THE MONTH 10 mL 2    pantoprazole (PROTONIX) 40 MG tablet 2 times daily      ibuprofen (ADVIL;MOTRIN) 600 MG tablet TAKE 1 TABLET BY MOUTH  EVERY 8 HOURS AS NEEDED FOR PAIN 270 tablet 2    sertraline (ZOLOFT) 100 MG tablet TAKE 1 AND 1/2 TABLETS BY  MOUTH DAILY (Patient taking differently: Taking one tablet daily  6/8/20) 135 tablet 3    butalbital-acetaminophen-caffeine (FIORICET, ESGIC) -40 MG per tablet Take 1 tablet by mouth every 6 hours as needed for Headaches 90 tablet 1    loperamide (IMODIUM) 2 MG capsule Take 1 capsule by mouth 4 times daily as needed for Diarrhea 360 capsule 3    SYRINGE-NEEDLE, DISP, 3 ML (B-D 3CC LUER-JUSTIN SYR 21GX1\") 21G X 1\" 3 ML MISC Use as directed with vitamin b every month 100 each 3    ZINC OXIDE, TOPICAL, 25 % OINT Apply 10 mLs topically 2 times daily 480 g 0    clotrimazole-betamethasone (LOTRISONE) 1-0.05 % cream apply topically to affected area twice a day 1 Tube 0    nystatin (MYCOSTATIN) 489933 UNIT/GM ointment Apply topically 2 times daily.  30 g 2    diltiazem (CARDIZEM) 60 MG tablet Take 90 mg by mouth 3 times daily       Nutritional Supplements (PEPTAMEN AF) LIQD Take by mouth Indications: TUBE FEED 1500 CALORIES A DAY      ondansetron (ZOFRAN) 8 MG tablet   Take 8 mg by mouth every 8 hours as needed  EPINEPHrine 0.3 MG/0.3ML SOAJ injection Inject 0.3 mg into the muscle.  Immune Globulin, Human, (HIZENTRA) 10 GM/50ML SOLN   Inject 10 g into the skin every 7 days THURSDAYS      Cholecalciferol (VITAMIN D-3) 5000 UNITS TABS Take by mouth daily       Diphenoxylate-Atropine (LOMOTIL PO) Take  by mouth as needed.  flecainide (TAMBOCOR) 50 MG tablet Take 50 mg by mouth daily.  dicyclomine (BENTYL) 20 MG tablet Take 20 mg by mouth every 6 hours as needed. No current facility-administered medications for this visit. Allergies   Allergen Reactions    Macrodantin [Nitrofurantoin Macrocrystal] Hives    Compazine [Prochlorperazine] Other (See Comments)     HYPER    Phenergan [Promethazine Hcl] Other (See Comments)     HYPER    Reglan [Metoclopramide] Other (See Comments)     HYPER, AGGITATED      Sulfa Antibiotics Rash    Vancomycin Other (See Comments)     HALLUCINATON    Vfend [Voriconazole] Other (See Comments)     hallucinations       Health Maintenance   Topic Date Due    Flu vaccine (1) 09/01/2020    Annual Wellness Visit (AWV)  12/09/2021 (Originally 6/17/2019)    Potassium monitoring  12/09/2020    Creatinine monitoring  12/09/2020    Colon cancer screen colonoscopy  01/12/2022    Breast cancer screen  06/09/2022    Lipid screen  08/15/2024    DTaP/Tdap/Td vaccine (2 - Td) 04/01/2026    DEXA (modify frequency per FRAX score)  Completed    Pneumococcal 65+ years Vaccine  Completed    Hepatitis C screen  Completed    Hepatitis A vaccine  Aged Out    Hepatitis B vaccine  Aged Out    Hib vaccine  Aged Out    Meningococcal (ACWY) vaccine  Aged Out       Subjective:      Review of Systems   Constitutional: Negative for activity change, appetite change, chills, fatigue, fever and unexpected weight change. HENT: Negative for congestion, ear pain, hearing loss, sinus pressure, sore throat and trouble swallowing. Eyes: Negative for visual disturbance.    Respiratory: Negative for cough, shortness of breath and wheezing. Cardiovascular: Negative for chest pain, palpitations and leg swelling. Gastrointestinal: Negative for abdominal pain, blood in stool, constipation, diarrhea, nausea and vomiting. Endocrine: Negative for cold intolerance, heat intolerance, polydipsia, polyphagia and polyuria. Genitourinary: Negative for difficulty urinating, frequency, hematuria and urgency. Musculoskeletal: Positive for arthralgias and back pain. Negative for myalgias. Skin: Negative for rash.        j-tube site infection   Allergic/Immunologic: Negative for environmental allergies. Neurological: Negative for dizziness, weakness, light-headedness and headaches. Psychiatric/Behavioral: Negative for confusion. The patient is not nervous/anxious. Objective:     Physical Exam  Constitutional:       Appearance: She is well-developed. HENT:      Head: Normocephalic. Eyes:      Conjunctiva/sclera: Conjunctivae normal.      Pupils: Pupils are equal, round, and reactive to light. Neck:      Musculoskeletal: Normal range of motion. Cardiovascular:      Rate and Rhythm: Normal rate and regular rhythm. Heart sounds: Normal heart sounds. No murmur. Pulmonary:      Effort: Pulmonary effort is normal.      Breath sounds: Normal breath sounds. No wheezing. Abdominal:      General: Bowel sounds are normal. There is no distension. Palpations: Abdomen is soft. Musculoskeletal: Normal range of motion. Skin:     General: Skin is warm and dry. Comments: J tube site with yellowish thick drainage, erythema around site, mild tenderness, mild odor   Neurological:      Mental Status: She is alert and oriented to person, place, and time. Psychiatric:         Behavior: Behavior normal.         Thought Content:  Thought content normal.         Judgment: Judgment normal.       /74   Wt 182 lb 6.4 oz (82.7 kg)   LMP  (LMP Unknown)   BMI 26.17 kg/m² Assessment:       Diagnosis Orders   1. Essential hypertension     2. CVID (common variable immunodeficiency) ([facility])     3. Jejunostomy tube present ([facility])  cephALEXin (KEFLEX) 500 MG capsule   4. 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 Cellulitis of abdominal wall  cephALEXin (KEFLEX) 500 MG capsule   7. Anxiety  LORazepam (ATIVAN) 1 MG tablet   8. Primary insomnia  LORazepam (ATIVAN) 1 MG tablet   9. Major depressive disorder with single episode, in partial remission ([address]               Plan:      Return in about 6 months (around 3/17/2021). HTN-well controlled on cardizem  CVID-stable with intermittent IgG  J tube, fistula, cellulitis-keflex, keep area clean and dry as possible with frequent split gauze changes, topical neosporin, call if does not improve  SVT-stable on cardizem  Anxiety, depression, insomnia-well controlled on current meds     Orders Placed This Encounter   Medications    cephALEXin (KEFLEX) 500 MG capsule     Sig: Take 1 capsule by mouth 3 times daily     Dispense:  30 capsule     Refill:  0    LORazepam (ATIVAN) 1 MG tablet     Sig: take 1 tablet by mouth every evening     Dispense:  90 tablet     Refill:  0       Patient given educational materials - see patient instructions. Discussed use, benefit, and side effects of prescribed medications. All patientquestions answered. Pt voiced understanding. Reviewed health maintenance. Instructedto continue current medications, diet and exercise. Patient agreed with treatmentplan. Follow up as directed.      Electronicallysigned by RTAY Cedeno CNP on 9/17/2020 at 11:02 AM

## 2020-11-27 RX ORDER — CEPHALEXIN 500 MG/1
500 CAPSULE ORAL 3 TIMES DAILY
Qty: 30 CAPSULE | Refills: 1 | Status: SHIPPED | OUTPATIENT
Start: 2020-11-27 | End: 2021-03-18 | Stop reason: ALTCHOICE

## 2020-12-01 ENCOUNTER — HOSPITAL ENCOUNTER (OUTPATIENT)
Facility: MEDICAL CENTER | Age: 70
End: 2020-12-01
Payer: MEDICARE

## 2020-12-10 ENCOUNTER — TELEPHONE (OUTPATIENT)
Dept: INFECTIOUS DISEASES | Age: 70
End: 2020-12-10

## 2020-12-16 ENCOUNTER — OFFICE VISIT (OUTPATIENT)
Dept: INFECTIOUS DISEASES | Age: 70
End: 2020-12-16
Payer: MEDICARE

## 2020-12-16 VITALS
RESPIRATION RATE: 20 BRPM | DIASTOLIC BLOOD PRESSURE: 82 MMHG | TEMPERATURE: 97.2 F | SYSTOLIC BLOOD PRESSURE: 155 MMHG | HEART RATE: 65 BPM | OXYGEN SATURATION: 100 % | WEIGHT: 178.2 LBS | HEIGHT: 70 IN | BODY MASS INDEX: 25.51 KG/M2

## 2020-12-16 PROCEDURE — 99214 OFFICE O/P EST MOD 30 MIN: CPT | Performed by: INTERNAL MEDICINE

## 2020-12-16 PROCEDURE — G8427 DOCREV CUR MEDS BY ELIG CLIN: HCPCS | Performed by: INTERNAL MEDICINE

## 2020-12-16 PROCEDURE — G8399 PT W/DXA RESULTS DOCUMENT: HCPCS | Performed by: INTERNAL MEDICINE

## 2020-12-16 PROCEDURE — G8484 FLU IMMUNIZE NO ADMIN: HCPCS | Performed by: INTERNAL MEDICINE

## 2020-12-16 PROCEDURE — 1090F PRES/ABSN URINE INCON ASSESS: CPT | Performed by: INTERNAL MEDICINE

## 2020-12-16 PROCEDURE — 1036F TOBACCO NON-USER: CPT | Performed by: INTERNAL MEDICINE

## 2020-12-16 PROCEDURE — G8417 CALC BMI ABV UP PARAM F/U: HCPCS | Performed by: INTERNAL MEDICINE

## 2020-12-16 PROCEDURE — 1123F ACP DISCUSS/DSCN MKR DOCD: CPT | Performed by: INTERNAL MEDICINE

## 2020-12-16 PROCEDURE — 3017F COLORECTAL CA SCREEN DOC REV: CPT | Performed by: INTERNAL MEDICINE

## 2020-12-16 PROCEDURE — 4040F PNEUMOC VAC/ADMIN/RCVD: CPT | Performed by: INTERNAL MEDICINE

## 2020-12-16 RX ORDER — AMOXICILLIN AND CLAVULANATE POTASSIUM 875; 125 MG/1; MG/1
1 TABLET, FILM COATED ORAL 2 TIMES DAILY
Qty: 20 TABLET | Refills: 0 | Status: SHIPPED | OUTPATIENT
Start: 2020-12-16 | End: 2020-12-26

## 2020-12-16 RX ORDER — AMOXICILLIN AND CLAVULANATE POTASSIUM 875; 125 MG/1; MG/1
1 TABLET, FILM COATED ORAL 2 TIMES DAILY
Qty: 20 TABLET | Refills: 0 | Status: SHIPPED | OUTPATIENT
Start: 2020-12-16 | End: 2020-12-16 | Stop reason: SDUPTHER

## 2020-12-16 ASSESSMENT — ENCOUNTER SYMPTOMS
EYE ITCHING: 0
APNEA: 0
COLOR CHANGE: 0
ABDOMINAL PAIN: 0

## 2020-12-16 NOTE — PROGRESS NOTES
[facility] - Initial Consult Note  Today's Date: 12/16/2020    Impression :   · History of Peg site fungal infection - related to increased drainage and maceration-Resolved  · Recent CHRISTOFER site early maceration and fungal infection  · Recent Left axillary enlarged LN - palpable 6 mo ago and again now -   ? Breast exam normal otherwise-Ultrasound and mammogram negative  · 3/7/18 Peg site cellulitis with local abscess formation, aspirated with 1 mL of pus in the office. Culture sent  · IGG deficiency  · Post RENATA fpr reflux leading to all the gastroparesis and cant tolerate the food he ce the PEG and Jtube    Recommendations   Plan:  Once it drains, get a anaerobic anerobic  cx AND start augmentin, pend results  Keep neosporin irrigation in the zonia peg area     Diagnosis Orders   1. Abdominal wall abscess  Culture, Aerobic and Anaerobic    amoxicillin-clavulanate (AUGMENTIN) 875-125 MG per tablet       Return if symptoms worsen or fail to improve. History of Present Illness:   Mimi Centeno is a 72y.o.-year-old  female who presents with   Chief Complaint   Patient presents with    Blister     wound check      Visit of 311/18  58-year-old lady was heavy history of Niesen fundoplasty with gastroparesis and intolerance to by mouth intake of food.  She denies mostly on tube feeds and has a J-tube for feeding as well as a gastric tube for reflux and suctioning.  She has been doing Hibiclens and around the PEG tube for long time now, i.e., many years. Mateusz Cook had no issues until very recently when she developed twice small abscesses around the exit of the acute.  Drainage of that abscess was done in the office of her doctor.  No culture taken. The area and around the PEG remains irritated and red.  This has been any development for a while now.   She comes in for an opinion today. Arlyn Mosley a seem redness around the pack.  Picture taken as below.  No fever associated and no rash other than in that area.  No cough or phlegm     Visit of 1/29/18  As recommended last time. She stopped her Hibiclens on the CHRISTOFER in the peg site started cleaning it with soap and water, as well as Lotrisone to the area of the PEG. There is a persistent leak according around the PEG area. The redness and the induration has completely resolved. I notice now that the J-tube site has a little bit of redness  No fever, no chills, and she is much happier with the results. Was asking me if she needs to go back on the IVIG as she did in the past for Concern that the abdominal wall infections might be a sign of low immunity  I doubt at this time that those infections or signing any immunity deficiency, rather I think are related to the ongoing leak around both tubes.     She also is telling me that she's noticed last week A Left breast streak redness from the left axillae, and spontaneously resolved. I'm noticing on my exam that she has on the left axillary area. A palpable lymph nodes. It seems that, 6 mo ago left axillae had a LN palpable, still to present today on my exam  Last mammogram neg May 2017. I think she is to repeat her mammogram earlier, this was discussed with the patient who is agreeable     Visit of 3/7/18, her mammogram and ultrasound were normal, but she developed an abscess and around the PEG. It has been getting worse and itching her with pain. No fever associated. Some redness around it. I was able to aspirate some fluid with a needle in the office one cc, sent for culture. Immediately the pain went down, he marked redness surrounding the PEG. We called Dr. Shelly Choi office, to try to fit her with the surgeon the next 24 hours.   Otherwise, the patient is to go to the ER if she gets worse.      plan:  · Abscess fluid will be sent for culture  · Start the patient on doxycycline with Keflex  · Patient to go to the ER if she doesn't feel good or if the redness gets larger than the demarcated area  · We talked to the General surgery office, they will try to fit her tomorrow for evaluation. · See me if things don't get better if she feels the problem is not resolved  · Once cultures are back. We'll contact her to see if we need to adjust antibiotics  · Otherwise, see me when necessary as needed  · She might need to call her immunologist regarding taken again to IVIG, which in the past protected her from repeated infections. visit 12/16/20  Recurrent small Abscesses around the peg again and not better after the peg was out temporarily. Associated prodromes and low grade fever chills. CHRISTOFER site clean no issues  Cleaning w neosporin irrigations   Fluid from recurrent abscesses is grey and white. Failed keflex many small courses    Plan:  Once it drains, get a anaerobic anerobic  cx AND start augmentin, pend results  Keep neosporin irrigation in the zonia peg area    I have personally reviewed the past medical history, past surgical history, medications, social history, and family history, and I haveupdated the database accordingly.   Past Medical History:     Past Medical History:   Diagnosis Date    Abdominal pain     Anxiety     CVID (common variable immunodeficiency) ([address]     Depression     Diarrhea     Difficulty swallowing     Dizziness     Fibromyalgia     Gastroparesis     Headache     History of blood transfusion     Hypogammaglobulinaemia, unspecified 2013    Irritable bowel syndrome     Jejunostomy tube present ([address]     USES TO FEED SELF , PEPTAMEN AF 1500 BESSY A DAY    Nausea & vomiting     Numbness     Postprocedural non-healing wound 6/26/2019    RLS (restless legs syndrome)     S/P percutaneous endoscopic gastrostomy (PEG) tube placement ([address]     USES TO DRAIN STOMACH    Sepsis ([address] 06/2006    Snores     SVT (supraventricular tachycardia) ([address] 2006    ON RX    Wears glasses     Wears glasses        Past Surgical  History:     Past Surgical History:   Procedure Laterality Date    ABSCESS DRAINAGE  03/08/2018     near peg tube- local    BREAST BIOPSY Left 8/13/15    BUNIONECTOMY Right [address]    ENDOMETRIAL ABLATION  2005    GASTRIC FUNDOPLICATION  2271    sx that injured the vagus nerves and caused gastroparesis    GASTROSTOMY TUBE PLACEMENT  2004    for stomach drainage    GASTROSTOMY TUBE PLACEMENT N/A 8/19/2019    EGD WITH GASTROSTOMY TUBE PLACEMENT - GI SCHEDULED performed by Sedrick Connor MD at [address]  2005    J-tube for feeding    KNEE SURGERY Right 1986    tumor    SC OFFICE/OUTPT VISIT,PROCEDURE ONLY N/A 3/8/2018    INCISION AND DRAINAGE ABSCESS NEAR PEG TUBE performed by Aurora Antoine IV, DO at [address]  2005    done to help gastroparesis fr vagus nerve injury   [facility] [place],  [address] TUNNELED VENOUS PORT PLACEMENT  2004    REMOVED 2 YRS LATER    UPPER GASTROINTESTINAL ENDOSCOPY  1993-2012    X 15 SOME WITH BX., SOME FOR DILATATION       Medications:     Current Outpatient Medications:     amoxicillin-clavulanate (AUGMENTIN) 875-125 MG per tablet, Take 1 tablet by mouth 2 times daily for 10 days, Disp: 20 tablet, Rfl: 0    LORazepam (ATIVAN) 1 MG tablet, take 1 tablet by mouth every evening, Disp: 90 tablet, Rfl: 0    cyanocobalamin 1000 MCG/ML injection, INJECT 1 MILLILITER INTRAMUSCULARLY EVERY FIRST OF THE MONTH, Disp: 10 mL, Rfl: 2    pantoprazole (PROTONIX) 40 MG tablet, 2 times daily, Disp: , Rfl:     ibuprofen (ADVIL;MOTRIN) 600 MG tablet, TAKE 1 TABLET BY MOUTH  EVERY 8 HOURS AS NEEDED FOR PAIN, Disp: 270 tablet, Rfl: 2    sertraline (ZOLOFT) 100 MG tablet, TAKE 1 AND 1/2 TABLETS BY  MOUTH DAILY (Patient taking differently: Taking one tablet daily  6/8/20), Disp: 135 tablet, Rfl: 3    butalbital-acetaminophen-caffeine (FIORICET, ESGIC) -40 MG per tablet, Take 1 tablet by mouth every 6 hours as needed for Headaches, Disp: 90 tablet, Rfl: 1    loperamide (IMODIUM) 2 MG capsule, Take 1 capsule by mouth 4 times daily as needed for Diarrhea, Disp: 360 capsule, Rfl: 3    SYRINGE-NEEDLE, DISP, 3 ML (B-D 3CC LUER-JUSTIN SYR 21GX1\") 21G X 1\" 3 ML MISC, Use as directed with vitamin b every month, Disp: 100 each, Rfl: 3    ZINC OXIDE, TOPICAL, 25 % OINT, Apply 10 mLs topically 2 times daily, Disp: 480 g, Rfl: 0    clotrimazole-betamethasone (LOTRISONE) 1-0.05 % cream, apply topically to affected area twice a day, Disp: 1 Tube, Rfl: 0    nystatin (MYCOSTATIN) 747455 UNIT/GM ointment, Apply topically 2 times daily. , Disp: 30 g, Rfl: 2    diltiazem (CARDIZEM) 60 MG tablet, Take 90 mg by mouth 3 times daily , Disp: , Rfl:     Nutritional Supplements (PEPTAMEN AF) LIQD, Take by mouth Indications: TUBE FEED 1500 CALORIES A DAY, Disp: , Rfl:     ondansetron (ZOFRAN) 8 MG tablet,  Take 8 mg by mouth every 8 hours as needed , Disp: , Rfl:     EPINEPHrine 0.3 MG/0.3ML SOAJ injection, Inject 0.3 mg into the muscle., Disp: , Rfl:     Immune Globulin, Human, (HIZENTRA) 10 GM/50ML SOLN,  Inject 10 g into the skin every 7 days THURSDAYS, Disp: , Rfl:     Cholecalciferol (VITAMIN D-3) 5000 UNITS TABS, Take by mouth daily , Disp: , Rfl:     Diphenoxylate-Atropine (LOMOTIL PO), Take  by mouth as needed. , Disp: , Rfl:     flecainide (TAMBOCOR) 50 MG tablet, Take 50 mg by mouth daily. , Disp: , Rfl:     dicyclomine (BENTYL) 20 MG tablet, Take 20 mg by mouth every 6 hours as needed. , Disp: , Rfl:     cephALEXin (KEFLEX) 500 MG capsule, Take 1 capsule by mouth 3 times daily (Patient not taking: Reported on 12/16/2020), Disp: 30 capsule, Rfl: 1      Social History:     Social History     Socioeconomic History    Marital status:      Spouse name: Not on file    Number of children: Not on file    Years of education: Not on file    Highest education level: Not on file   Occupational History    Not on file   Social Needs    Financial resource strain: Not on file    Food insecurity     Worry: Not on file     Inability: Not on file    Transportation needs     Medical: Not on file     Non-medical: Not on file   Tobacco Use    Smoking status: Never Smoker    Smokeless tobacco: Never Used   Substance and Sexual Activity    Alcohol use: No    Drug use: No    Sexual activity: Not on file   Lifestyle    Physical activity     Days per week: Not on file     Minutes per session: Not on file    Stress: Not on file   Relationships    Social connections     Talks on phone: Not on file     Gets together: Not on file     Attends Nondenominational service: Not on file     Active member of club or organization: Not on file     Attends meetings of clubs or organizations: Not on file     Relationship status: Not on file    Intimate partner violence     Fear of current or ex partner: Not on file     Emotionally abused: Not on file     Physically abused: Not on file     Forced sexual activity: Not on file   Other Topics Concern    Not on file   Social History Narrative    Not on file       Family History:     Family History   Problem Relation Age of Onset    Hypertension Mother     Heart Disease Paternal Grandfather     Stroke Father     Cancer Brother         KIDNEY AND PROSTATE    Colon Cancer Maternal Uncle     Cancer Maternal Grandmother         NON HODGINS LYMPHOMA    Cancer Maternal Grandfather         LUNG AND ESOPHAGEAL        Allergies:   Macrodantin [nitrofurantoin macrocrystal], Compazine [prochlorperazine], Phenergan [promethazine hcl], Reglan [metoclopramide], Sulfa antibiotics, Vancomycin, and Vfend [voriconazole]     Review of Systems:   Review of Systems   Constitutional: Negative for activity change and appetite change. HENT: Negative for congestion. Eyes: Negative for itching. Respiratory: Negative for apnea. Cardiovascular: Negative for chest pain. Gastrointestinal: Negative for abdominal pain.    Endocrine: Negative for heat 29.9 12/23/2019     06/02/2020     12/23/2019    LYMPHOPCT 24 06/02/2020    LYMPHOPCT 32 12/23/2019    LYMPHOPCT 25.3 05/13/2019    MONOPCT 7 06/02/2020    MONOPCT 9 12/23/2019    MONOPCT 9.7 05/13/2019    EOSPCT 2.0 05/13/2019     BMP:  Lab Results   Component Value Date     06/17/2019     05/13/2019    K 4.3 06/17/2019    K 4.5 05/13/2019     06/17/2019     05/13/2019    CO2 21 06/17/2019    CO2 28 05/13/2019    BUN 32 06/17/2019    BUN 14 05/13/2019    CREATININE 0.96 08/15/2019    CREATININE 1.23 06/17/2019     Hepatic Function Panel:   Lab Results   Component Value Date    PROT 7.8 06/17/2019    PROT 6.9 11/16/2015    LABALBU 4.4 06/17/2019    LABALBU 3.9 11/16/2015    BILIDIR <0.08 11/16/2015    IBILI CANNOT BE CALCULATED 11/16/2015    BILITOT 0.30 06/17/2019    BILITOT 0.23 11/16/2015    ALKPHOS 119 06/17/2019    ALKPHOS 109 11/16/2015    ALT 13 08/15/2019    ALT 28 06/17/2019    AST 23 08/15/2019    AST 24 06/17/2019     No results found for: RPR  No results found for: HIV  No results found for: [facility]  Lab Results   Component Value Date    RBC 4.13 06/02/2020    WBC 6.1 06/02/2020     Lab Results   Component Value Date    CREATININE 0.96 08/15/2019    GLUCOSE 98 06/17/2019   you for allowing us to participate in the care of this patient. Please call with questions. Cinthia Levy MD  - Office: [phone]    Please note that this chart was generated using voice recognition Dragon dictation software. Although every effort was made to ensure the accuracy of this automated transcription, some errors in transcription mayhave occurred.

## 2020-12-21 ENCOUNTER — HOSPITAL ENCOUNTER (OUTPATIENT)
Age: 70
Setting detail: SPECIMEN
Discharge: HOME | End: 2020-12-21
Payer: MEDICARE

## 2020-12-22 ENCOUNTER — TELEPHONE (OUTPATIENT)
Dept: INFECTIOUS DISEASES | Age: 70
End: 2020-12-22

## 2020-12-22 NOTE — TELEPHONE ENCOUNTER
Pt seen 12/16/20 per your recommendations pt was to call office if abscess breaks open. Maki Mcmillan calling office to report it did last night and she took culture to lab last night. Report is pending now- will monitor today. I asked her to call office IF she doesn't get a call today. Plan:  12/16/20   Once it drains, get a anaerobic anerobic  cx AND start augmentin, pend results  Keep neosporin irrigation in the zonia peg area      3:43 STILL PENDING - Sent perfect serve message to Dr Eliz Reyes to inform.

## 2020-12-23 ENCOUNTER — TELEPHONE (OUTPATIENT)
Dept: INFECTIOUS DISEASES | Age: 70
End: 2020-12-23

## 2020-12-23 NOTE — TELEPHONE ENCOUNTER
Reinier Escoto MD 12/22/2020 3:38 PM Alicia New. Female, 72 y.o., 1950  MRN:   [id number]  Pt seen 12/16/20 per your recommendations pt was to call office if abscess breaks open. Sumi Lerner calling office to report it did last night and she took culture to lab last night. Report is pending now- asked pt to call office tomorrow to f/u as I will not be in office to track. LOU     Plan: 12/16/20   Once it drains, get a anaerobic anerobic cx AND start augmentin, pend results  Keep neosporin irrigation in the zonia peg area Read 12/22/2020 3:53 PM 12/22/2020 3:56 PM Where dud she send it? Pls get me that result. I dont see it in system. Th loto 12/22/2020 3:56 PM Olu :)) 12/22/2020 4:31 PM pending results in epic. I asked her to call tomorrow to check up on. leaving for the day, off the rest of the week. ROXANN TEJADA! I will ask Andreina Tolentino to watch for it too. Read 12/22/2020 4:51 PM 12/22/2020 4:52 PM Roxann. Lucia! Ty! 12/23/2020 8:23 AM good morning Dr. Willie Burton, here is the preliminary as of this morning :12/22/2020 7:45 PM - Garrett, pn Incoming Lab Results From [facility]    Specimen Information: Abdomen    Component Collected Lab  Specimen Description 12/21/2020 4:00  [address]  . ABDOMEN   Special Requests 12/21/2020 4:00  [address]  NOT REPORTED   Direct Exam Abnormal 12/21/2020 4:00 PM [address]   Direct Exam Abnormal 12/21/2020 4:00  [address]  RARE BUDDING YEAST   Direct Exam Abnormal 12/21/2020 4:00 PM [place] NEGATIVE RODS   Culture 12/21/2020 4:00  [address]  CULTURE IN PROGRESS     Andreina Tolentino Read 12/23/2020 8:23 AM 12/23/2020 8:59 AM Zerita Abimael give her fluconazole 100 daily per peg. X 7. Tell her it is final yet. Keep what she is doing 12/23/2020 9:04 AM ok ill call it in.  Read 12/23/2020 9:05 AM    Pt informed and phoned script into [facility] spoke with Radha Mancilla

## 2020-12-26 LAB
CULTURE: ABNORMAL
DIRECT EXAM: ABNORMAL
Lab: ABNORMAL
SPECIMEN DESCRIPTION: ABNORMAL

## 2020-12-28 ENCOUNTER — TELEPHONE (OUTPATIENT)
Dept: INFECTIOUS DISEASES | Age: 70
End: 2020-12-28

## 2020-12-28 RX ORDER — FLUCONAZOLE 100 MG/1
100 TABLET ORAL DAILY
Qty: 3 TABLET | Refills: 0 | OUTPATIENT
Start: 2020-12-28 | End: 2020-12-31

## 2020-12-28 RX ORDER — AMOXICILLIN AND CLAVULANATE POTASSIUM 875; 125 MG/1; MG/1
1 TABLET, FILM COATED ORAL 2 TIMES DAILY
Qty: 14 TABLET | Refills: 0 | Status: SHIPPED | OUTPATIENT
Start: 2020-12-28 | End: 2021-01-04

## 2020-12-28 NOTE — TELEPHONE ENCOUNTER
Patient wanted to know if she should stay on ABX. Yes.  I ordered another augmentin and renewed the fluc x 3 more days  Called patient to inform

## 2021-01-20 DIAGNOSIS — K52.9 CHRONIC DIARRHEA: ICD-10-CM

## 2021-01-21 ENCOUNTER — PATIENT MESSAGE (OUTPATIENT)
Dept: PRIMARY CARE CLINIC | Age: 71
End: 2021-01-21

## 2021-01-21 DIAGNOSIS — F51.01 PRIMARY INSOMNIA: ICD-10-CM

## 2021-01-21 DIAGNOSIS — F41.9 ANXIETY: Chronic | ICD-10-CM

## 2021-01-21 RX ORDER — LOPERAMIDE HYDROCHLORIDE 2 MG/1
2 CAPSULE ORAL 4 TIMES DAILY PRN
Qty: 360 CAPSULE | Refills: 3 | Status: SHIPPED | OUTPATIENT
Start: 2021-01-21 | End: 2022-06-02

## 2021-01-21 NOTE — TELEPHONE ENCOUNTER
Last OV 09/17/20  Health Maintenance   Topic Date Due    Potassium monitoring  12/09/2020    Creatinine monitoring  12/09/2020    Annual Wellness Visit (AWV)  12/09/2021 (Originally 6/17/2019)    Colon cancer screen colonoscopy  01/12/2022    Breast cancer screen  06/09/2022    Lipid screen  08/15/2024    DTaP/Tdap/Td vaccine (2 - Td) 04/01/2026    DEXA (modify frequency per FRAX score)  Completed    Flu vaccine  Completed    Pneumococcal 65+ years Vaccine  Completed    Hepatitis C screen  Completed    Hepatitis A vaccine  Aged Out    Hepatitis B vaccine  Aged Out    Hib vaccine  Aged Out    Meningococcal (ACWY) vaccine  Aged Out             (applicable per patient's age: Cancer Screenings, Depression Screening, Fall Risk Screening, Immunizations)    LDL Cholesterol (mg/dL)   Date Value   08/15/2019 107     LDL Calculated (mg/dL)   Date Value   06/28/2018 108     AST (U/L)   Date Value   08/15/2019 23     ALT (U/L)   Date Value   08/15/2019 13     BUN (mg/dL)   Date Value   06/17/2019 32 (H)      (goal A1C is < 7)   (goal LDL is <100) need 30-50% reduction from baseline     BP Readings from Last 3 Encounters:   12/16/20 (!) 155/82   09/17/20 126/74   07/31/20 (!) 148/78    (goal /80)      All Future Testing planned in [facility]:  Lab Frequency Next Occurrence   CBC Auto Differential     Ferritin         Next Visit Date:  Future Appointments   Date Time Provider Samuel Faulkner   1/25/2021  9:00 AM SCHEDULE, [facility]   3/18/2021  9:20 AM TRAY Fernando - CNP [facility]   5/28/2021 12:00 PM SCHEDULE, [facility]   6/4/2021 10:15 AM Gary Mckeon MD [facility]            Patient Active Problem List:     Depression     Anxiety     Irritable bowel syndrome     Gastroparesis     Fibromyalgia     Fatigue     Hypogammaglobulinemia (HCC)     Renal insufficiency     Osteopenia     History of stress fracture     Jejunostomy tube present ([address]     Gastrointestinal tube present ([address]     H/O sepsis     Essential hypertension     Primary insomnia     Adverse drug effect     Dry mouth     Family history of other condition     Feeding difficulties     Cellulitis     CVID (common variable immunodeficiency) ([address]     Hypogammaglobulinemia ([address]     Gastrocutaneous fistula due to gastrostomy tube     Dehydration     Postprocedural non-healing wound     Gastrostomy complication, unspecified (HCC)     Dysphagia     Gastrocutaneous fistula     Neuropathy     Chronic diarrhea     PEG (percutaneous endoscopic gastrostomy) status ([address]     Iron deficiency anemia     Iron malabsorption     Supraventricular tachycardia ([address]

## 2021-01-22 RX ORDER — LORAZEPAM 1 MG/1
TABLET ORAL
Qty: 90 TABLET | Refills: 0 | Status: SHIPPED | OUTPATIENT
Start: 2021-01-22 | End: 2021-05-11 | Stop reason: SDUPTHER

## 2021-01-22 NOTE — TELEPHONE ENCOUNTER
From: Oziel Mcqueen  To: TRAY Shaikh - CNP  Sent: 1/21/2021 11:07 PM EST  Subject: Prescription Question    [address] I need a refill for 90 days of lorazepam 1 mg. I get this thru the mail order [facility]. I have about 10 left and don't take them every day. Thanks. Carla Olivo. I want the vaccine so bad so if u ever get any in your office let me know. I have the immune deficiency and I keep running into dead ends. I am 70 so have to wait till 66 opens up.

## 2021-01-25 ENCOUNTER — HOSPITAL ENCOUNTER (OUTPATIENT)
Facility: MEDICAL CENTER | Age: 71
Discharge: HOME | End: 2021-01-25
Payer: MEDICARE

## 2021-01-25 DIAGNOSIS — D50.8 OTHER IRON DEFICIENCY ANEMIA: ICD-10-CM

## 2021-01-25 DIAGNOSIS — K90.9 IRON MALABSORPTION: ICD-10-CM

## 2021-01-25 LAB
ABSOLUTE EOS #: 0.32 K/UL (ref 0–0.44)
ABSOLUTE IMMATURE GRANULOCYTE: 0.01 K/UL (ref 0–0.3)
ABSOLUTE LYMPH #: 1.89 K/UL (ref 1.1–3.7)
ABSOLUTE MONO #: 0.5 K/UL (ref 0.1–1.2)
BASOPHILS # BLD: 1 % (ref 0–2)
BASOPHILS ABSOLUTE: 0.05 K/UL (ref 0–0.2)
DIFFERENTIAL TYPE: ABNORMAL
EOSINOPHILS RELATIVE PERCENT: 6 % (ref 1–4)
FERRITIN: 122 UG/L (ref 13–150)
HCT VFR BLD CALC: 38 % (ref 36.3–47.1)
HEMOGLOBIN: 12.6 G/DL (ref 11.9–15.1)
IMMATURE GRANULOCYTES: 0 %
LYMPHOCYTES # BLD: 33 % (ref 24–43)
MCH RBC QN AUTO: 31.8 PG (ref 25.2–33.5)
MCHC RBC AUTO-ENTMCNC: 33.2 G/DL (ref 28.4–34.8)
MCV RBC AUTO: 96 FL (ref 82.6–102.9)
MONOCYTES # BLD: 9 % (ref 3–12)
NRBC AUTOMATED: 0 PER 100 WBC
PDW BLD-RTO: 13.7 % (ref 11.8–14.4)
PLATELET # BLD: 233 K/UL (ref 138–453)
PLATELET ESTIMATE: ABNORMAL
PMV BLD AUTO: 10.5 FL (ref 8.1–13.5)
RBC # BLD: 3.96 M/UL (ref 3.95–5.11)
RBC # BLD: ABNORMAL 10*6/UL
SEG NEUTROPHILS: 51 % (ref 36–65)
SEGMENTED NEUTROPHILS ABSOLUTE COUNT: 3.01 K/UL (ref 1.5–8.1)
WBC # BLD: 5.8 K/UL (ref 3.5–11.3)
WBC # BLD: ABNORMAL 10*3/UL

## 2021-01-25 PROCEDURE — 85025 COMPLETE CBC W/AUTO DIFF WBC: CPT

## 2021-01-25 PROCEDURE — 82728 ASSAY OF FERRITIN: CPT

## 2021-01-25 PROCEDURE — 36415 COLL VENOUS BLD VENIPUNCTURE: CPT

## 2021-01-25 RX ORDER — SERTRALINE HYDROCHLORIDE 100 MG/1
TABLET, FILM COATED ORAL
Qty: 135 TABLET | Refills: 3 | Status: SHIPPED | OUTPATIENT
Start: 2021-01-25 | End: 2021-12-13

## 2021-02-08 RX ORDER — SYRINGE WITH NEEDLE, 1 ML 25GX5/8"
SYRINGE, EMPTY DISPOSABLE MISCELLANEOUS
Qty: 100 EACH | Refills: 3 | Status: SHIPPED | OUTPATIENT
Start: 2021-02-08 | End: 2022-07-15

## 2021-02-08 NOTE — TELEPHONE ENCOUNTER
LOV 9/17/2020    Next appt 3/18/2021    Health Maintenance   Topic Date Due    COVID-19 Vaccine (1 of 2) 08/14/1966    Potassium monitoring  12/09/2020    Creatinine monitoring  12/09/2020    Annual Wellness Visit (AWV)  12/09/2021 (Originally 6/17/2019)    Colon cancer screen colonoscopy  01/12/2022    Breast cancer screen  06/09/2022    Lipid screen  08/15/2024    DTaP/Tdap/Td vaccine (2 - Td) 04/01/2026    DEXA (modify frequency per FRAX score)  Completed    Flu vaccine  Completed    Pneumococcal 65+ years Vaccine  Completed    Hepatitis C screen  Completed    Hepatitis A vaccine  Aged Out    Hepatitis B vaccine  Aged Out    Hib vaccine  Aged Out    Meningococcal (ACWY) vaccine  Aged Out             (applicable per patient's age: Cancer Screenings, Depression Screening, Fall Risk Screening, Immunizations)    LDL Cholesterol (mg/dL)   Date Value   08/15/2019 107     LDL Calculated (mg/dL)   Date Value   06/28/2018 108     AST (U/L)   Date Value   08/15/2019 23     ALT (U/L)   Date Value   08/15/2019 13     BUN (mg/dL)   Date Value   06/17/2019 32 (H)      (goal A1C is < 7)   (goal LDL is <100) need 30-50% reduction from baseline     BP Readings from Last 3 Encounters:   12/16/20 (!) 155/82   09/17/20 126/74   07/31/20 (!) 148/78    (goal /80)      All Future Testing planned in [facility]:  Lab Frequency Next Occurrence   CBC Auto Differential     Ferritin         Next Visit Date:  Future Appointments   Date Time Provider Samuel Faulkner   3/18/2021  9:20 AM TRAY Hawkins CNP [facility]   5/28/2021 12:00 PM SCHEDULE, [facility]   6/4/2021 10:15 AM Gary Mckeon MD [facility]            Patient Active Problem List:     Depression     Anxiety     Irritable bowel syndrome     Gastroparesis     Fibromyalgia     Fatigue     Hypogammaglobulinemia ([address]     Renal insufficiency     Osteopenia     History of stress fracture

## 2021-03-03 ENCOUNTER — PATIENT MESSAGE (OUTPATIENT)
Dept: INFECTIOUS DISEASES | Age: 71
End: 2021-03-03

## 2021-03-03 DIAGNOSIS — L02.211 ABDOMINAL WALL ABSCESS: Primary | ICD-10-CM

## 2021-03-03 RX ORDER — FLUCONAZOLE 100 MG/1
100 TABLET ORAL DAILY
Qty: 7 TABLET | Refills: 0 | Status: SHIPPED | OUTPATIENT
Start: 2021-03-03 | End: 2021-03-10

## 2021-03-03 RX ORDER — AMOXICILLIN 500 MG/1
500 CAPSULE ORAL 3 TIMES DAILY
Qty: 21 CAPSULE | Refills: 0 | Status: SHIPPED | OUTPATIENT
Start: 2021-03-03 | End: 2021-03-10

## 2021-03-03 NOTE — TELEPHONE ENCOUNTER
Ion Wahl MD 3/3/2021 11:15 AM     Dr Leopold Abbot, I received a my chart message from Patria Perez ([birth year]) reading:    Last nite I had a small abscess that broke. Same spot as always. been washing with hebaclens in morning and nighttime use J and J first aid wash. using zinc oxide and the site looked really good. started hurting last night, swollen and broke a couple hours later with heat. I last took augmentin and last dose 1/7 and was on it for 17 days    Do I need antibiotic. If so dont want Augmentin niharika cant take the diarrhea. Dont need to come in for visit. Shira Lau    Per last dictation:    Impression :  · History of Peg site fungal infection - related to increased drainage and maceration-Resolved  · Recent CHRISTOFER site early maceration and fungal infection  · Recent Left axillary enlarged LN - palpable 6 mo ago and again now -   ? Breast exam normal otherwise-Ultrasound and mammogram negative  · 3/7/18 Peg site cellulitis with local abscess formation, aspirated with 1 mL of pus in the office. Culture sent  · IGG deficiency  · Post RENATA fpr reflux leading to all the gastroparesis and cant tolerate the food he ce the PEG and Jtube    Recommendations  Plan:  Once it drains, get a anaerobic anerobic cx AND start augmentin, pend results  Keep neosporin irrigation in the zonia peg area    Please advise. Thanks, Shae     Read 3/3/2021 11:18 AM     3/3/2021 11:26 AM     We can do amoxicillin x a week   Fluconazole same  I ll order    *Sent my chart message back to patient informing of Dr Suki Wolfe response. *

## 2021-03-18 ENCOUNTER — OFFICE VISIT (OUTPATIENT)
Dept: PRIMARY CARE CLINIC | Age: 71
End: 2021-03-18
Payer: MEDICARE

## 2021-03-18 VITALS
RESPIRATION RATE: 18 BRPM | DIASTOLIC BLOOD PRESSURE: 86 MMHG | BODY MASS INDEX: 25.88 KG/M2 | SYSTOLIC BLOOD PRESSURE: 138 MMHG | HEIGHT: 70 IN | HEART RATE: 58 BPM | OXYGEN SATURATION: 99 % | WEIGHT: 180.8 LBS

## 2021-03-18 DIAGNOSIS — Z00.00 ROUTINE GENERAL MEDICAL EXAMINATION AT A HEALTH CARE FACILITY: Primary | ICD-10-CM

## 2021-03-18 DIAGNOSIS — I10 ESSENTIAL HYPERTENSION: Chronic | ICD-10-CM

## 2021-03-18 DIAGNOSIS — F41.9 ANXIETY: Chronic | ICD-10-CM

## 2021-03-18 DIAGNOSIS — Z12.31 ENCOUNTER FOR SCREENING MAMMOGRAM FOR BREAST CANCER: ICD-10-CM

## 2021-03-18 DIAGNOSIS — Z93.1 GASTROINTESTINAL TUBE PRESENT (HCC): Chronic | ICD-10-CM

## 2021-03-18 DIAGNOSIS — I47.1 SUPRAVENTRICULAR TACHYCARDIA (HCC): ICD-10-CM

## 2021-03-18 DIAGNOSIS — K94.20 GASTROSTOMY COMPLICATION, UNSPECIFIED (HCC): Chronic | ICD-10-CM

## 2021-03-18 DIAGNOSIS — Z93.4 JEJUNOSTOMY TUBE PRESENT (HCC): Chronic | ICD-10-CM

## 2021-03-18 DIAGNOSIS — D83.9 CVID (COMMON VARIABLE IMMUNODEFICIENCY) (HCC): ICD-10-CM

## 2021-03-18 DIAGNOSIS — F32.4 MAJOR DEPRESSIVE DISORDER WITH SINGLE EPISODE, IN PARTIAL REMISSION (HCC): ICD-10-CM

## 2021-03-18 DIAGNOSIS — F51.01 PRIMARY INSOMNIA: ICD-10-CM

## 2021-03-18 PROCEDURE — 4040F PNEUMOC VAC/ADMIN/RCVD: CPT | Performed by: NURSE PRACTITIONER

## 2021-03-18 PROCEDURE — G0438 PPPS, INITIAL VISIT: HCPCS | Performed by: NURSE PRACTITIONER

## 2021-03-18 PROCEDURE — 1123F ACP DISCUSS/DSCN MKR DOCD: CPT | Performed by: NURSE PRACTITIONER

## 2021-03-18 PROCEDURE — 3017F COLORECTAL CA SCREEN DOC REV: CPT | Performed by: NURSE PRACTITIONER

## 2021-03-18 PROCEDURE — G8484 FLU IMMUNIZE NO ADMIN: HCPCS | Performed by: NURSE PRACTITIONER

## 2021-03-18 RX ORDER — TRAZODONE HYDROCHLORIDE 50 MG/1
50 TABLET ORAL NIGHTLY
Qty: 30 TABLET | Refills: 5 | Status: SHIPPED | OUTPATIENT
Start: 2021-03-18 | End: 2021-06-14 | Stop reason: ALTCHOICE

## 2021-03-18 RX ORDER — KETOCONAZOLE 20 MG/ML
SHAMPOO TOPICAL
COMMUNITY
Start: 2021-02-08 | End: 2021-06-14 | Stop reason: ALTCHOICE

## 2021-03-18 ASSESSMENT — PATIENT HEALTH QUESTIONNAIRE - PHQ9
SUM OF ALL RESPONSES TO PHQ QUESTIONS 1-9: 0
2. FEELING DOWN, DEPRESSED OR HOPELESS: 0
SUM OF ALL RESPONSES TO PHQ9 QUESTIONS 1 & 2: 0
SUM OF ALL RESPONSES TO PHQ QUESTIONS 1-9: 0

## 2021-03-18 ASSESSMENT — LIFESTYLE VARIABLES: HOW OFTEN DO YOU HAVE A DRINK CONTAINING ALCOHOL: 0

## 2021-03-18 NOTE — PATIENT INSTRUCTIONS
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 Some of the tests and screenings are paid in full while other may be subject to a deductible, co-insurance, and/or copay. Some of these benefits include a comprehensive review of your medical history including lifestyle, illnesses that may run in your family, and various assessments and screenings as appropriate. After reviewing your medical record and screening and assessments performed today your provider may have ordered immunizations, labs, imaging, and/or referrals for you. A list of these orders (if applicable) as well as your Preventive Care list are included within your After Visit Summary for your review. Other Preventive Recommendations:    · A preventive eye exam performed by an eye specialist is recommended every 1-2 years to screen for glaucoma; cataracts, macular degeneration, and other eye disorders. · A preventive dental visit is recommended every 6 months. · Try to get at least 150 minutes of exercise per week or 10,000 steps per day on a pedometer . · Order or download the FREE \"Exercise & Physical Activity: Your Everyday Guide\" from [employer]. Call 8-528.976.2523 or search [employer] online. · You need 5368-9478 mg of calcium and 5690-2784 IU of vitamin D per day. It is possible to meet your calcium requirement with diet alone, but a vitamin D supplement is usually necessary to meet this goal.  · When exposed to the sun, use a sunscreen that protects against both UVA and UVB radiation with an SPF of 30 or greater. Reapply every 2 to 3 hours or after sweating, drying off with a towel, or swimming. · Always wear a seat belt when traveling in a car. Always wear a helmet when riding a bicycle or motorcycle.

## 2021-03-18 NOTE — PROGRESS NOTES
Medicare Annual Wellness Visit  Name: Faustino Herron Date: 3/18/2021   MRN: [id number] Sex: Female   Age: 72 y.o. Ethnicity: Non-/Non    : 1950 Race: Sera Burroughs is here for Medicare AWV    Screenings for behavioral, psychosocial and functional/safety risks, and cognitive dysfunction are all negative except as indicated below. These results, as well as other patient data from the [address] form, are documented in Flowsheets linked to this Encounter. Allergies   Allergen Reactions    Macrodantin [Nitrofurantoin Macrocrystal] Hives    Compazine [Prochlorperazine] Other (See Comments)     HYPER    Phenergan [Promethazine Hcl] Other (See Comments)     HYPER    Reglan [Metoclopramide] Other (See Comments)     HYPER, AGGITATED      Sulfa Antibiotics Rash    Vancomycin Other (See Comments)     HALLUCINATON    Vfend [Voriconazole] Other (See Comments)     hallucinations         Prior to Visit Medications    Medication Sig Taking?  Authorizing Provider   traZODone (DESYREL) 50 MG tablet Take 1 tablet by mouth nightly Yes TRAY Casillas CNP   SYRINGE-NEEDLE, DISP, 3 ML (B-D 3CC LUER-JUSTIN SYR 21GX1\") 21G X 1\" 3 ML MISC use as directed Yes William Knowles PA-C   sertraline (ZOLOFT) 100 MG tablet TAKE 1 AND 1/2 TABLETS BY  MOUTH DAILY Yes TRAY Jordan CNP   LORazepam (ATIVAN) 1 MG tablet take 1 tablet by mouth every evening Yes TRAY Jordan CNP   loperamide (IMODIUM) 2 MG capsule TAKE 1 CAPSULE BY MOUTH 4  TIMES DAILY AS NEEDED FOR  DIARRHEA Yes TRAY Jordan CNP   cyanocobalamin 1000 MCG/ML injection INJECT 1 MILLILITER INTRAMUSCULARLY EVERY FIRST OF THE MONTH Yes TRAY Jordan CNP   pantoprazole (PROTONIX) 40 MG tablet 2 times daily Yes Historical Provider, MD   ibuprofen (ADVIL;MOTRIN) 600 MG tablet TAKE 1 TABLET BY MOUTH  EVERY 8 HOURS AS NEEDED FOR PAIN Yes TRAY Casillas CNP butalbital-acetaminophen-caffeine (FIORICET, ESGIC) -40 MG per tablet Take 1 tablet by mouth every 6 hours as needed for Headaches Yes TRAY Boateng - CNP   ZINC OXIDE, TOPICAL, 25 % OINT Apply 10 mLs topically 2 times daily Yes Reid Willson MD   clotrimazole-betamethasone (LOTRISONE) 1-0.05 % cream apply topically to affected area twice a day Yes Marcelo Potter MD   nystatin (MYCOSTATIN) 867853 UNIT/GM ointment Apply topically 2 times daily. Yes Benson Ashton DO   diltiazem (CARDIZEM) 60 MG tablet Take 90 mg by mouth 3 times daily  Yes Historical Provider, MD   Nutritional Supplements (PEPTAMEN AF) LIQD Take by mouth Indications: TUBE FEED 1500 CALORIES A DAY Yes Historical Provider, MD   ondansetron (ZOFRAN) 8 MG tablet   Take 8 mg by mouth every 8 hours as needed  Yes Historical Provider, MD   EPINEPHrine 0.3 MG/0.3ML SOAJ injection Inject 0.3 mg into the muscle. Yes Historical Provider, MD   Immune Globulin, Human, (HIZENTRA) 10 GM/50ML SOLN   Inject 10 g into the skin every 7 days THURSDAYS Yes Historical Provider, MD   Cholecalciferol (VITAMIN D-3) 5000 UNITS TABS Take by mouth daily  Yes Historical Provider, MD   Diphenoxylate-Atropine (LOMOTIL PO) Take  by mouth as needed. Yes Historical Provider, MD   flecainide (TAMBOCOR) 50 MG tablet Take 50 mg by mouth daily. Yes Historical Provider, MD   dicyclomine (BENTYL) 20 MG tablet Take 20 mg by mouth every 6 hours as needed.  Yes Historical Provider, MD   ketoconazole (NIZORAL) 2 % shampoo SHAMPOO WITH A SMALL AMOUNT AS DIRECTED EVERY OTHER DAY  Historical Provider, MD         Past Medical History:   Diagnosis Date    Abdominal pain     Anxiety     CVID (common variable immunodeficiency) ([address]     Depression     Diarrhea     Difficulty swallowing     Dizziness     Fibromyalgia     Gastroparesis     Headache     History of blood transfusion     Hypogammaglobulinaemia, unspecified 2013    Irritable bowel syndrome     Jejunostomy tube present ([address]     USES TO FEED SELF , PEPTAMEN AF 1500 BESSY A DAY    Nausea & vomiting     Numbness     Postprocedural non-healing wound 6/26/2019    RLS (restless legs syndrome)     S/P percutaneous endoscopic gastrostomy (PEG) tube placement ([address]     USES TO DRAIN STOMACH    Sepsis ([address] 06/2006    Snores     SVT (supraventricular tachycardia) ([address] 2006    ON RX    Wears glasses     Wears glasses        Past Surgical History:   Procedure Laterality Date    ABSCESS DRAINAGE  03/08/2018     near peg tube- local    BREAST BIOPSY Left 8/13/15    BUNIONECTOMY Right [address]    ENDOMETRIAL ABLATION  2005    GASTRIC FUNDOPLICATION  9154    sx that injured the vagus nerves and caused gastroparesis    GASTROSTOMY TUBE PLACEMENT  2004    for stomach drainage    GASTROSTOMY TUBE PLACEMENT N/A 8/19/2019    EGD WITH GASTROSTOMY TUBE PLACEMENT - GI SCHEDULED performed by Emy Neely MD at [address]  2005    J-tube for feeding    KNEE SURGERY Right 1986    tumor    CO OFFICE/OUTPT VISIT,PROCEDURE ONLY N/A 3/8/2018    INCISION AND DRAINAGE ABSCESS NEAR PEG TUBE performed by Marcin Antoine IV, DO at [address]  2005    done to help gastroparesis fr vagus nerve injury   [address] [facility] [address] TUNNELED VENOUS PORT PLACEMENT  2004    REMOVED 2 YRS LATER    UPPER GASTROINTESTINAL ENDOSCOPY  1993-2012    X 15 SOME WITH BX., SOME FOR DILATATION         Family History   Problem Relation Age of Onset    Hypertension Mother     Heart Disease Paternal Grandfather     Stroke Father     Cancer Brother         KIDNEY AND PROSTATE    Colon Cancer Maternal Uncle     Cancer Maternal Grandmother         NON HODGINS LYMPHOMA    Cancer Maternal Grandfather         LUNG AND ESOPHAGEAL       CareTeam (Including outside providers/suppliers regularly involved in providing care):   Patient Care Team:  Zoe Duverney TRAY Avendaño CNP as PCP - General (Family Nurse Practitioner)  TRAY Castro CNP as PCP - [facility]  Kilo Adams MD as Consulting Physician  Gwen Cloud MD as Consulting Physician  Pastora Bautista as Physician (Allergy)  Carmina Field DO as Consulting Physician (General Surgery)  Reji Villalta MD as Consulting Physician (Infectious Diseases)    Wt Readings from Last 3 Encounters:   03/18/21 180 lb 12.8 oz (82 kg)   12/16/20 178 lb 3.2 oz (80.8 kg)   09/17/20 182 lb 6.4 oz (82.7 kg)     Vitals:    03/18/21 0919 03/18/21 0934   BP: (!) 142/88 138/86   Pulse: 58    Resp: 18    SpO2: 99%    Weight: 180 lb 12.8 oz (82 kg)    Height: 5' 10\" (1.778 m)      Body mass index is 25.94 kg/m². Based upon direct observation of the patient, evaluation of cognition reveals recent and remote memory intact. Lungs clear bilaterally, heart rate regular, no edema    Patient's complete Health Risk Assessment and screening values have been reviewed and are found in Flowsheets. The following problems were reviewed today and where indicated follow up appointments were made and/or referrals ordered. Positive Risk Factor Screenings with Interventions:     Fall Risk:  Timed Up and Go Test > 12 seconds? (Complete if either Fall Risk answers are Yes): (!) yes  2 or more falls in past year?: no  Fall with injury in past year?: no          General Health and ACP:  General  In general, how would you say your health is?: Good  In the past 7 days, have you experienced any of the following?  New or Increased Pain, New or Increased Fatigue, Loneliness, Social Isolation, Stress or Anger?: (!) Stress  Do you get the social and emotional support that you need?: Yes  Do you have a Living Will?: Yes  Advance Directives     Power of  Living Will ACP-Advance Directive ACP-Power of     Not on File Not on File Not on File Not on [facility] Interventions:  · has living will at home, reminded to provide copy to office     Hearing/Vision:  No exam data present  Hearing/Vision  Do you or your family notice any trouble with your hearing that hasn't been managed with hearing aids?: (!) Yes  Do you have difficulty driving, watching TV, or doing any of your daily activities because of your eyesight?: No  Have you had an eye exam within the past year?: Yes      Personalized Preventive Plan   Current Health Maintenance Status  Immunization History   Administered Date(s) Administered    COVID-19, Moderna, PF, 100mcg/0.5mL 02/11/2021, 03/10/2021    Influenza 11/01/2006    Influenza A (H5N1) Monovalent Vaccine, Adjuvanted-2013 09/25/2013, 10/05/2015, 10/10/2016    Influenza Virus Vaccine 11/01/2006, 10/01/2013, 10/05/2015, 10/10/2016, 10/11/2016, 10/17/2018    Influenza, Quadv, IM, (6 mo and older Fluzone, Flulaval, Fluarix and 3 yrs and older Afluria) 10/11/2016, 10/26/2017    Influenza, Triv, inactivated, subunit, adjuvanted, IM (Fluad 65 yrs and older) 09/29/2018, 10/11/2019    Pneumococcal Conjugate 13-valent ([id number]) 10/10/2016    Pneumococcal Conjugate 7-valent (Prevnar7) 09/26/2013    Pneumococcal Polysaccharide (Emsduxszh30) 09/25/2013, 07/12/2018, 10/01/2018    Tdap (Boostrix, Adacel) 04/01/2016        Health Maintenance   Topic Date Due    Flu vaccine (1) 09/01/2020    Potassium monitoring  12/09/2020    Creatinine monitoring  12/09/2020    Annual Wellness Visit (AWV)  12/09/2021 (Originally 6/17/2019)    Colon cancer screen colonoscopy  01/12/2022    Breast cancer screen  06/09/2022    Lipid screen  08/15/2024    DTaP/Tdap/Td vaccine (2 - Td) 04/01/2026    DEXA (modify frequency per FRAX score)  Completed    Pneumococcal 65+ years Vaccine  Completed    COVID-19 Vaccine  Completed    Hepatitis C screen  Completed    Hepatitis A vaccine  Aged Out    Hepatitis B vaccine  Aged Out    Hib vaccine  Aged Out    Meningococcal (ACWY) vaccine  Aged Out     Recommendations for [employer] Due: see orders and patient instructions/AVS.  . Recommended screening schedule for the next 5-10 years is provided to the patient in written form: see Patient Sivan Patrick was seen today for medicare awv. Diagnoses and all orders for this visit:    Routine general medical examination at a health care facility    CVID (common [facility] immunodeficiency) ([facility]    Anxiety  -     Comprehensive Metabolic Panel; Future    Essential hypertension  -     Comprehensive Metabolic Panel; Future    Gastrostomy complication, unspecified ([facility]  Recently completed course of antibiotic therapy for recurrent infection at J and G-tube sites  Jejunostomy tube present [facility])    Gastrointestinal tube present ([facility]    Encounter for screening mammogram for breast cancer  -     [facility] DIGITAL SCREEN W OR WO CAD BILATERAL; Future    Primary insomnia  Discussed appropriate us of trazodone, may need dose increase to be effective. Also advised may take along with ativan if needed but encouraged to avoid if possible  -     traZODone (DESYREL) 50 MG tablet; Take 1 tablet by mouth nightly      Other chronic conditions are stable/unchanged. Due for screening mammogram in 3 months. She continues to see her infectious disease provider on regular basis  And will follow up as planned.

## 2021-05-10 ENCOUNTER — PATIENT MESSAGE (OUTPATIENT)
Dept: PRIMARY CARE CLINIC | Age: 71
End: 2021-05-10

## 2021-05-10 DIAGNOSIS — F51.01 PRIMARY INSOMNIA: ICD-10-CM

## 2021-05-10 DIAGNOSIS — F41.9 ANXIETY: Chronic | ICD-10-CM

## 2021-05-11 RX ORDER — LORAZEPAM 1 MG/1
TABLET ORAL
Qty: 90 TABLET | Refills: 0 | Status: SHIPPED | OUTPATIENT
Start: 2021-05-11 | End: 2021-08-31 | Stop reason: SDUPTHER

## 2021-05-11 NOTE — TELEPHONE ENCOUNTER
From: Mann Flowers  To: Alycia Vaughn, TRAY - CNP  Sent: 5/10/2021 10:23 PM EDT  Subject: Prescription Question    Could you refill my Ativan 1mg. Usually get a 90 day supply and use [facility]. I take it at bedtime when I cannot sleep and get anxious. Thank you. Jenny Enriquez.

## 2021-05-28 ENCOUNTER — HOSPITAL ENCOUNTER (OUTPATIENT)
Age: 71
Setting detail: SPECIMEN
Discharge: HOME | End: 2021-05-28
Payer: MEDICARE

## 2021-05-28 ENCOUNTER — HOSPITAL ENCOUNTER (OUTPATIENT)
Facility: MEDICAL CENTER | Age: 71
Discharge: HOME | End: 2021-05-28
Payer: MEDICARE

## 2021-05-28 DIAGNOSIS — D50.8 OTHER IRON DEFICIENCY ANEMIA: ICD-10-CM

## 2021-05-28 DIAGNOSIS — K90.9 IRON MALABSORPTION: ICD-10-CM

## 2021-05-28 LAB
ABSOLUTE EOS #: 0.22 K/UL (ref 0–0.44)
ABSOLUTE IMMATURE GRANULOCYTE: 0.01 K/UL (ref 0–0.3)
ABSOLUTE LYMPH #: 1.62 K/UL (ref 1.1–3.7)
ABSOLUTE MONO #: 0.42 K/UL (ref 0.1–1.2)
ALT SERPL-CCNC: 19 U/L (ref 5–33)
AST SERPL-CCNC: 16 U/L
BASOPHILS # BLD: 1 % (ref 0–2)
BASOPHILS ABSOLUTE: 0.06 K/UL (ref 0–0.2)
CHOLESTEROL/HDL RATIO: 3.3
CHOLESTEROL: 194 MG/DL
DIFFERENTIAL TYPE: NORMAL
EOSINOPHILS RELATIVE PERCENT: 4 % (ref 1–4)
FERRITIN: 119 UG/L (ref 13–150)
HCT VFR BLD CALC: 40.3 % (ref 36.3–47.1)
HDLC SERPL-MCNC: 59 MG/DL
HEMOGLOBIN: 12.7 G/DL (ref 11.9–15.1)
IMMATURE GRANULOCYTES: 0 %
LDL CHOLESTEROL: 107 MG/DL (ref 0–130)
LYMPHOCYTES # BLD: 29 % (ref 24–43)
MCH RBC QN AUTO: 30.8 PG (ref 25.2–33.5)
MCHC RBC AUTO-ENTMCNC: 31.5 G/DL (ref 28.4–34.8)
MCV RBC AUTO: 97.6 FL (ref 82.6–102.9)
MONOCYTES # BLD: 8 % (ref 3–12)
NRBC AUTOMATED: NORMAL PER 100 WBC
PDW BLD-RTO: 13.4 % (ref 11.8–14.4)
PLATELET # BLD: 267 K/UL (ref 138–453)
PLATELET ESTIMATE: NORMAL
PMV BLD AUTO: 11.1 FL (ref 8.1–13.5)
RBC # BLD: 4.13 M/UL (ref 3.95–5.11)
RBC # BLD: NORMAL 10*6/UL
SEG NEUTROPHILS: 58 % (ref 36–65)
SEGMENTED NEUTROPHILS ABSOLUTE COUNT: 3.24 K/UL (ref 1.5–8.1)
TRIGL SERPL-MCNC: 139 MG/DL
VLDLC SERPL CALC-MCNC: NORMAL MG/DL (ref 1–30)
WBC # BLD: 5.6 K/UL (ref 3.5–11.3)
WBC # BLD: NORMAL 10*3/UL

## 2021-05-28 PROCEDURE — 36415 COLL VENOUS BLD VENIPUNCTURE: CPT

## 2021-05-28 PROCEDURE — 85025 COMPLETE CBC W/AUTO DIFF WBC: CPT

## 2021-05-28 PROCEDURE — 84450 TRANSFERASE (AST) (SGOT): CPT

## 2021-05-28 PROCEDURE — 80061 LIPID PANEL: CPT

## 2021-05-28 PROCEDURE — 82728 ASSAY OF FERRITIN: CPT

## 2021-05-28 PROCEDURE — 84460 ALANINE AMINO (ALT) (SGPT): CPT

## 2021-06-07 ENCOUNTER — HOSPITAL ENCOUNTER (OUTPATIENT)
Facility: MEDICAL CENTER | Age: 71
End: 2021-06-07
Payer: MEDICARE

## 2021-06-14 ENCOUNTER — OFFICE VISIT (OUTPATIENT)
Dept: ONCOLOGY | Age: 71
End: 2021-06-14
Payer: MEDICARE

## 2021-06-14 ENCOUNTER — TELEPHONE (OUTPATIENT)
Dept: ONCOLOGY | Age: 71
End: 2021-06-14

## 2021-06-14 VITALS
DIASTOLIC BLOOD PRESSURE: 82 MMHG | TEMPERATURE: 97.7 F | HEART RATE: 84 BPM | BODY MASS INDEX: 25.9 KG/M2 | SYSTOLIC BLOOD PRESSURE: 141 MMHG | WEIGHT: 180.5 LBS

## 2021-06-14 DIAGNOSIS — K90.9 IRON MALABSORPTION: Primary | ICD-10-CM

## 2021-06-14 DIAGNOSIS — D50.8 OTHER IRON DEFICIENCY ANEMIA: ICD-10-CM

## 2021-06-14 PROCEDURE — 99211 OFF/OP EST MAY X REQ PHY/QHP: CPT

## 2021-06-14 PROCEDURE — 4040F PNEUMOC VAC/ADMIN/RCVD: CPT | Performed by: INTERNAL MEDICINE

## 2021-06-14 PROCEDURE — 1036F TOBACCO NON-USER: CPT | Performed by: INTERNAL MEDICINE

## 2021-06-14 PROCEDURE — 1090F PRES/ABSN URINE INCON ASSESS: CPT | Performed by: INTERNAL MEDICINE

## 2021-06-14 PROCEDURE — G8417 CALC BMI ABV UP PARAM F/U: HCPCS | Performed by: INTERNAL MEDICINE

## 2021-06-14 PROCEDURE — G8399 PT W/DXA RESULTS DOCUMENT: HCPCS | Performed by: INTERNAL MEDICINE

## 2021-06-14 PROCEDURE — 99214 OFFICE O/P EST MOD 30 MIN: CPT | Performed by: INTERNAL MEDICINE

## 2021-06-14 PROCEDURE — 1123F ACP DISCUSS/DSCN MKR DOCD: CPT | Performed by: INTERNAL MEDICINE

## 2021-06-14 PROCEDURE — 3017F COLORECTAL CA SCREEN DOC REV: CPT | Performed by: INTERNAL MEDICINE

## 2021-06-14 PROCEDURE — G8427 DOCREV CUR MEDS BY ELIG CLIN: HCPCS | Performed by: INTERNAL MEDICINE

## 2021-06-14 RX ORDER — LOSARTAN POTASSIUM 50 MG/1
50 TABLET ORAL DAILY
COMMUNITY
Start: 2021-05-12

## 2021-06-14 NOTE — PROGRESS NOTES
DIAGNOSIS:   1. Iron deficiency anemia, malabsorption  2. HX gastroparesis, Nissen fundoplication       CURRENT THERAPY:  IV iron - excellent response. BRIEF CASE HISTORY:   Karen Becerra is a very pleasant 72 y.o. female who is referred to us for anemia. She has history of anemia and has received blood and iron transfusions in the past. She has iron malabsorption component secondary to chronic gastroparesis due to Nissen fundoplication and has PEG tube in place for drainage and Jtube for feeding. She has leg cramps, PICA, and mouth soreness secondary to anemia. She also complains of some abdominal pain. INTERIM HISTORY: The patient presents for follow up for anemia. She reports her exertional shortness of breath has started to recur. PAST MEDICAL HISTORY: has a past medical history of Abdominal pain, Anxiety, CVID (common variable immunodeficiency) ([address] Depression, Diarrhea, Difficulty swallowing, Dizziness, Fibromyalgia, Gastroparesis, Headache, History of blood transfusion, Hypogammaglobulinaemia, unspecified, Irritable bowel syndrome, Jejunostomy tube present ([address] Nausea & vomiting, Numbness, Postprocedural non-healing wound, RLS (restless legs syndrome), S/P percutaneous endoscopic gastrostomy (PEG) tube placement ([address] Sepsis ([address] Snores, SVT (supraventricular tachycardia) ([address] Wears glasses, and Wears glasses. PAST SURGICAL HISTORY: has a past surgical history that includes Cholecystectomy (1972); Tonsillectomy and adenoidectomy (1963); Bunionectomy (Right, 1985); knee surgery (Right, 1986); Pyloroplasty (2005); Gastrostomy tube placement (2004); Gastric fundoplication (4921); ileostomy or jejunostomy (2005); Upper gastrointestinal endoscopy (7552-8963); Tunneled venous port placement (2004); Endometrial ablation (2005); Breast biopsy (Left, 8/13/15);  Abscess Drainage (03/08/2018); pr office/outpt visit,procedure only (N/A, 3/8/2018); and Gastrostomy tube placement (N/A, 8/19/2019). CURRENT MEDICATIONS:  has a current medication list which includes the following prescription(s): losartan, lorazepam, b-d 3cc luer-yoshi syr 21gx1\", sertraline, loperamide, cyanocobalamin, pantoprazole, ibuprofen, butalbital-acetaminophen-caffeine, zinc oxide (topical), clotrimazole-betamethasone, diltiazem, peptamen af, ondansetron, vitamin d-3, diphenoxylate-atropine, flecainide, dicyclomine, epinephrine, and immune globulin (human). ALLERGIES:  is allergic to macrodantin [nitrofurantoin macrocrystal], compazine [prochlorperazine], phenergan [promethazine hcl], reglan [metoclopramide], sulfa antibiotics, vancomycin, and vfend [voriconazole]. FAMILY HISTORY: Negative for any hematological or oncological conditions. SOCIAL HISTORY:  reports that she has never smoked. She has never used smokeless tobacco. She reports that she does not drink alcohol and does not use drugs. REVIEW OF SYSTEMS:   General: No fever or night sweats. Weight is stable. ENT: No double or blurred vision, no tinnitus or hearing problem, no dysphagia or sore throat  Respiratory: No chest pain, no cough or hemoptysis. +exertional shortness of breath  Cardiovascular: Denies chest pain, PND or orthopnea. No L E swelling or palpitations. Gastrointestinal: No nausea or vomiting, diarrhea or constipation  Genitourinary: Denies dysuria, hematuria, frequency, urgency or incontinence. Neurological: Denies headaches, decreased LOC, no sensory or motor focal deficits. Musculoskeletal: No arthralgia no back pain or joint swelling. Skin: There are no rashes or bleeding. Psychiatric: No anxiety, no depression. Endocrine: No diabetes or thyroid disease. Hematologic: No bleeding, no adenopathy. PHYSICAL EXAM: Shows a well appearing 72y.o.-year-old female who is not in pain or distress.  Vital Signs: Blood pressure (!) 141/82, pulse 84, temperature 97.7 °F (36.5 °C), temperature source Temporal, weight 180 lb 8 oz (81.9 kg), not currently breastfeeding. HEENT: Angular cheilitis and stomatitis Normocephalic and atraumatic. Pupils are equal, round, reactive to light and accommodation. Extraocular muscles are intact. Neck: Showed no JVD, no carotid bruit . Lungs: Clear to auscultation bilaterally. Heart: Regular without any murmur. Abdomen: Soft, nontender. No hepatosplenomegaly. Extremities: Lower extremities show no edema, clubbing, or cyanosis. Breasts: Examination not done today. Neuro exam: intact cranial nerves bilaterally no motor or sensory deficit, gait is normal. Lymphatic: no adenopathy appreciated in the supraclavicular, axillary, cervical or inguinal area    REVIEW OF LABORATORY DATA:   12/09/2019:  HGB: 9.5  MCV: 79  HCT: 29.7      Lab Results   Component Value Date    WBC 5.6 05/28/2021    HGB 12.7 05/28/2021    HCT 40.3 05/28/2021    MCV 97.6 05/28/2021     05/28/2021       Chemistry        Component Value Date/Time     06/17/2019 1038    K 4.3 06/17/2019 1038     06/17/2019 1038    CO2 21 06/17/2019 1038    BUN 32 (H) 06/17/2019 1038    CREATININE 0.96 (H) 08/15/2019 1300        Component Value Date/Time    CALCIUM 9.4 06/17/2019 1038    ALKPHOS 119 (H) 06/17/2019 1038    AST 16 05/28/2021 1145    ALT 19 05/28/2021 1145    BILITOT 0.30 06/17/2019 1038        Lab Results   Component Value Date    IRON 28 05/13/2019    TIBC 389 05/13/2019    FERRITIN 119 05/28/2021         REVIEW OF RADIOLOGICAL RESULTS:     IMPRESSION:   1. Iron deficiency anemia, malabsorption  2. HX gastroparesis   3. IV iron - excellent response    PLAN:   1. Her lab work was reviewed, her counts and ferritin remain in range. 2. We discussed her exertional shortness of breath. 3. I recommend she take vitamin D regularly and monitor and we charles start monitoring lab work quarterly. 4. We will plan for IV iron with any recurrent anemia. 5. Return in 1 year.

## 2021-07-23 ENCOUNTER — HOSPITAL ENCOUNTER (OUTPATIENT)
Dept: MAMMOGRAPHY | Age: 71
Discharge: HOME | End: 2021-07-25
Payer: MEDICARE

## 2021-07-23 DIAGNOSIS — Z12.31 ENCOUNTER FOR SCREENING MAMMOGRAM FOR BREAST CANCER: ICD-10-CM

## 2021-07-23 PROCEDURE — 77063 BREAST TOMOSYNTHESIS BI: CPT

## 2021-08-31 ENCOUNTER — PATIENT MESSAGE (OUTPATIENT)
Dept: PRIMARY CARE CLINIC | Age: 71
End: 2021-08-31

## 2021-08-31 DIAGNOSIS — F41.9 ANXIETY: Chronic | ICD-10-CM

## 2021-08-31 DIAGNOSIS — F51.01 PRIMARY INSOMNIA: ICD-10-CM

## 2021-08-31 RX ORDER — LORAZEPAM 1 MG/1
TABLET ORAL
Qty: 90 TABLET | Refills: 0 | Status: SHIPPED | OUTPATIENT
Start: 2021-08-31 | End: 2022-01-03 | Stop reason: SDUPTHER

## 2021-08-31 NOTE — TELEPHONE ENCOUNTER
From: Valentino Walton  To: TRAY Moore - CNP  Sent: 8/31/2021 11:35 AM EDT  Subject: Prescription Question    Vipul Louder. I just came from Dr Quiroz Chamber office from a follow up Woody Kimmie of hearing loss in my right hear. I had a work up by the audiologist an there is a 30 percent hearing loss but in my right ear there is some kind of asymmetry. He said sometimes this is indicative of a tumor so he is ordering a brain MRI. Could you refill my Ativan so I can take it before I go for it. I usually take 1 mg but can I take more for the MRI. ? If you could call it into [facility] instead of mail order I will get it in time. I see you on 9/16 and was going to get it filled then but now this came up. Life is always throwing a curve ball but it will be ok I hope.

## 2021-09-13 ENCOUNTER — HOSPITAL ENCOUNTER (OUTPATIENT)
Age: 71
Setting detail: SPECIMEN
Discharge: HOME | End: 2021-09-13
Payer: MEDICARE

## 2021-09-13 ENCOUNTER — HOSPITAL ENCOUNTER (OUTPATIENT)
Dept: MRI IMAGING | Age: 71
Discharge: HOME | End: 2021-09-15
Payer: MEDICARE

## 2021-09-13 DIAGNOSIS — H90.3 COCHLEAR HEARING LOSS, BILATERAL: ICD-10-CM

## 2021-09-13 DIAGNOSIS — K90.9 IRON MALABSORPTION: ICD-10-CM

## 2021-09-13 DIAGNOSIS — H93.291 DYSACUSIS, RIGHT: ICD-10-CM

## 2021-09-13 DIAGNOSIS — D50.8 OTHER IRON DEFICIENCY ANEMIA: ICD-10-CM

## 2021-09-13 LAB
ABSOLUTE EOS #: 0.21 K/UL (ref 0–0.44)
ABSOLUTE IMMATURE GRANULOCYTE: <0.03 K/UL (ref 0–0.3)
ABSOLUTE LYMPH #: 1.52 K/UL (ref 1.1–3.7)
ABSOLUTE MONO #: 0.38 K/UL (ref 0.1–1.2)
BASOPHILS # BLD: 1 % (ref 0–2)
BASOPHILS ABSOLUTE: 0.04 K/UL (ref 0–0.2)
DIFFERENTIAL TYPE: NORMAL
EOSINOPHILS RELATIVE PERCENT: 4 % (ref 1–4)
FERRITIN: 131 UG/L (ref 13–150)
GFR NON-AFRICAN AMERICAN: 43 ML/MIN
GFR SERPL CREATININE-BSD FRML MDRD: 52 ML/MIN
GFR SERPL CREATININE-BSD FRML MDRD: ABNORMAL ML/MIN/{1.73_M2}
HCT VFR BLD CALC: 41.3 % (ref 36.3–47.1)
HEMOGLOBIN: 13.1 G/DL (ref 11.9–15.1)
IMMATURE GRANULOCYTES: 0 %
LYMPHOCYTES # BLD: 28 % (ref 24–43)
MCH RBC QN AUTO: 30.8 PG (ref 25.2–33.5)
MCHC RBC AUTO-ENTMCNC: 31.7 G/DL (ref 28.4–34.8)
MCV RBC AUTO: 96.9 FL (ref 82.6–102.9)
MONOCYTES # BLD: 7 % (ref 3–12)
NRBC AUTOMATED: 0 PER 100 WBC
PDW BLD-RTO: 13.3 % (ref 11.8–14.4)
PLATELET # BLD: 262 K/UL (ref 138–453)
PLATELET ESTIMATE: NORMAL
PMV BLD AUTO: 11.3 FL (ref 8.1–13.5)
POC CREATININE: 1.24 MG/DL (ref 0.51–1.19)
RBC # BLD: 4.26 M/UL (ref 3.95–5.11)
RBC # BLD: NORMAL 10*6/UL
SEG NEUTROPHILS: 60 % (ref 36–65)
SEGMENTED NEUTROPHILS ABSOLUTE COUNT: 3.29 K/UL (ref 1.5–8.1)
WBC # BLD: 5.5 K/UL (ref 3.5–11.3)
WBC # BLD: NORMAL 10*3/UL

## 2021-09-13 PROCEDURE — 70553 MRI BRAIN STEM W/O & W/DYE: CPT

## 2021-09-13 PROCEDURE — A9576 INJ PROHANCE MULTIPACK: HCPCS | Performed by: OTOLARYNGOLOGY

## 2021-09-13 PROCEDURE — 36415 COLL VENOUS BLD VENIPUNCTURE: CPT

## 2021-09-13 PROCEDURE — 6360000004 HC RX CONTRAST MEDICATION: Performed by: OTOLARYNGOLOGY

## 2021-09-13 PROCEDURE — 85025 COMPLETE CBC W/AUTO DIFF WBC: CPT

## 2021-09-13 PROCEDURE — 82565 ASSAY OF CREATININE: CPT

## 2021-09-13 PROCEDURE — 82728 ASSAY OF FERRITIN: CPT

## 2021-09-13 RX ORDER — SODIUM CHLORIDE 0.9 % (FLUSH) 0.9 %
10 SYRINGE (ML) INJECTION PRN
Status: DISCONTINUED | OUTPATIENT
Start: 2021-09-13 | End: 2021-09-16 | Stop reason: HOSPADM

## 2021-09-13 RX ADMIN — GADOTERIDOL 16 ML: 279.3 INJECTION, SOLUTION INTRAVENOUS at 09:01

## 2021-09-16 ENCOUNTER — OFFICE VISIT (OUTPATIENT)
Dept: PRIMARY CARE CLINIC | Age: 71
End: 2021-09-16
Payer: MEDICARE

## 2021-09-16 VITALS
HEART RATE: 72 BPM | OXYGEN SATURATION: 99 % | BODY MASS INDEX: 25.68 KG/M2 | WEIGHT: 179 LBS | RESPIRATION RATE: 16 BRPM | DIASTOLIC BLOOD PRESSURE: 70 MMHG | SYSTOLIC BLOOD PRESSURE: 112 MMHG

## 2021-09-16 DIAGNOSIS — F41.9 ANXIETY: Chronic | ICD-10-CM

## 2021-09-16 DIAGNOSIS — H90.A21 SENSORINEURAL HEARING LOSS (SNHL) OF RIGHT EAR WITH RESTRICTED HEARING OF LEFT EAR: ICD-10-CM

## 2021-09-16 DIAGNOSIS — R79.9 ELEVATED BUN: ICD-10-CM

## 2021-09-16 DIAGNOSIS — R79.89 CREATININE ELEVATION: ICD-10-CM

## 2021-09-16 DIAGNOSIS — I10 ESSENTIAL HYPERTENSION: Primary | Chronic | ICD-10-CM

## 2021-09-16 DIAGNOSIS — K31.84 GASTROPARESIS: Chronic | ICD-10-CM

## 2021-09-16 PROBLEM — K31.6 GASTROCUTANEOUS FISTULA: Status: RESOLVED | Noted: 2019-08-28 | Resolved: 2021-09-16

## 2021-09-16 PROBLEM — K94.20: Chronic | Status: RESOLVED | Noted: 2019-06-26 | Resolved: 2021-09-16

## 2021-09-16 PROCEDURE — 1123F ACP DISCUSS/DSCN MKR DOCD: CPT | Performed by: NURSE PRACTITIONER

## 2021-09-16 PROCEDURE — G8427 DOCREV CUR MEDS BY ELIG CLIN: HCPCS | Performed by: NURSE PRACTITIONER

## 2021-09-16 PROCEDURE — G8399 PT W/DXA RESULTS DOCUMENT: HCPCS | Performed by: NURSE PRACTITIONER

## 2021-09-16 PROCEDURE — 3017F COLORECTAL CA SCREEN DOC REV: CPT | Performed by: NURSE PRACTITIONER

## 2021-09-16 PROCEDURE — 1036F TOBACCO NON-USER: CPT | Performed by: NURSE PRACTITIONER

## 2021-09-16 PROCEDURE — 4040F PNEUMOC VAC/ADMIN/RCVD: CPT | Performed by: NURSE PRACTITIONER

## 2021-09-16 PROCEDURE — G8417 CALC BMI ABV UP PARAM F/U: HCPCS | Performed by: NURSE PRACTITIONER

## 2021-09-16 PROCEDURE — 99214 OFFICE O/P EST MOD 30 MIN: CPT | Performed by: NURSE PRACTITIONER

## 2021-09-16 PROCEDURE — 1090F PRES/ABSN URINE INCON ASSESS: CPT | Performed by: NURSE PRACTITIONER

## 2021-09-16 SDOH — ECONOMIC STABILITY: FOOD INSECURITY: WITHIN THE PAST 12 MONTHS, YOU WORRIED THAT YOUR FOOD WOULD RUN OUT BEFORE YOU GOT MONEY TO BUY MORE.: NEVER TRUE

## 2021-09-16 SDOH — ECONOMIC STABILITY: FOOD INSECURITY: WITHIN THE PAST 12 MONTHS, THE FOOD YOU BOUGHT JUST DIDN'T LAST AND YOU DIDN'T HAVE MONEY TO GET MORE.: NEVER TRUE

## 2021-09-16 ASSESSMENT — ENCOUNTER SYMPTOMS
COUGH: 0
WHEEZING: 0
SORE THROAT: 0
CONSTIPATION: 0
BLOOD IN STOOL: 0
NAUSEA: 0
SINUS PRESSURE: 0
ABDOMINAL PAIN: 0
VOMITING: 0
TROUBLE SWALLOWING: 0
DIARRHEA: 0
SHORTNESS OF BREATH: 0

## 2021-09-16 ASSESSMENT — PATIENT HEALTH QUESTIONNAIRE - PHQ9
2. FEELING DOWN, DEPRESSED OR HOPELESS: 0
SUM OF ALL RESPONSES TO PHQ QUESTIONS 1-9: 0
1. LITTLE INTEREST OR PLEASURE IN DOING THINGS: 0
SUM OF ALL RESPONSES TO PHQ9 QUESTIONS 1 & 2: 0

## 2021-09-16 ASSESSMENT — SOCIAL DETERMINANTS OF HEALTH (SDOH): HOW HARD IS IT FOR YOU TO PAY FOR THE VERY BASICS LIKE FOOD, HOUSING, MEDICAL CARE, AND HEATING?: NOT HARD AT ALL

## 2021-09-16 NOTE — PROGRESS NOTES
Is the person to be vaccinated sick today? no    Does the person to be vaccinated have an allergy to eggs or to a component of the vaccine? No    Has the person to be vaccinated ever had a serious reaction to influenza vaccine in the past? No    Has the person to be vaccinated ever had Guillan-Waldorf' Syndrome?  No

## 2021-09-16 NOTE — PROGRESS NOTES
SELF , PEPTAMEN AF 1500 BESSY A DAY    Nausea & vomiting     Numbness     Postprocedural non-healing wound 6/26/2019    RLS (restless legs syndrome)     S/P percutaneous endoscopic gastrostomy (PEG) tube placement ([address]     USES TO DRAIN STOMACH    Sepsis ([address] 06/2006    Snores     SVT (supraventricular tachycardia) ([address] 2006    ON RX    Wears glasses     Wears glasses       Past Surgical History:   Procedure Laterality Date    ABSCESS DRAINAGE  03/08/2018     near peg tube- local    BREAST BIOPSY Left 8/13/15    BUNIONECTOMY Right [address]    ENDOMETRIAL ABLATION  2005    GASTRIC FUNDOPLICATION  7206    sx that injured the vagus nerves and caused gastroparesis    GASTROSTOMY TUBE PLACEMENT  2004    for stomach drainage    GASTROSTOMY TUBE PLACEMENT N/A 8/19/2019    EGD WITH GASTROSTOMY TUBE PLACEMENT - GI SCHEDULED performed by Mary Shepherd MD at [address]  2005    J-tube for feeding   [facility] Right 1986    tumor    HI OFFICE/OUTPT VISIT,PROCEDURE ONLY N/A 3/8/2018    INCISION AND DRAINAGE ABSCESS NEAR PEG TUBE performed by Ev Antoine IV, DO at [address]  2005    done to help gastroparesis fr vagus nerve injury   [address] [facility] [address] TUNNELED VENOUS PORT PLACEMENT  2004    REMOVED 2 YRS LATER    UPPER GASTROINTESTINAL ENDOSCOPY  1993-2012    X 15 SOME WITH BX., SOME FOR DILATATION       Family History   Problem Relation Age of Onset    Hypertension Mother     Heart Disease Paternal Grandfather     Stroke Father     Cancer Brother         KIDNEY AND PROSTATE    Colon Cancer Maternal Uncle     Cancer Maternal Grandmother         NON HODGINS LYMPHOMA    Cancer Maternal Grandfather         LUNG AND ESOPHAGEAL          Social History     Tobacco Use    Smoking status: Never Smoker    Smokeless tobacco: Never Used   Substance Use Topics    Alcohol use: No      Current Outpatient Medications   Medication Sig Dispense Refill    LORazepam (ATIVAN) 1 MG tablet take 1 tablet by mouth every evening 90 tablet 0    losartan (COZAAR) 50 MG tablet Take 50 mg by mouth daily      SYRINGE-NEEDLE, DISP, 3 ML (B-D 3CC LUER-JUSTIN SYR 21GX1\") 21G X 1\" 3 ML MISC use as directed 100 each 3    sertraline (ZOLOFT) 100 MG tablet TAKE 1 AND 1/2 TABLETS BY  MOUTH DAILY (Patient taking differently: Take 100 mg by mouth daily Indications: pt takes 1 tablet daily ) 135 tablet 3    loperamide (IMODIUM) 2 MG capsule TAKE 1 CAPSULE BY MOUTH 4  TIMES DAILY AS NEEDED FOR  DIARRHEA 360 capsule 3    cyanocobalamin 1000 MCG/ML injection INJECT 1 MILLILITER INTRAMUSCULARLY EVERY FIRST OF THE MONTH 10 mL 2    pantoprazole (PROTONIX) 40 MG tablet 2 times daily      ibuprofen (ADVIL;MOTRIN) 600 MG tablet TAKE 1 TABLET BY MOUTH  EVERY 8 HOURS AS NEEDED FOR PAIN 270 tablet 2    butalbital-acetaminophen-caffeine (FIORICET, ESGIC) -40 MG per tablet Take 1 tablet by mouth every 6 hours as needed for Headaches 90 tablet 1    ZINC OXIDE, TOPICAL, 25 % OINT Apply 10 mLs topically 2 times daily 480 g 0    diltiazem (CARDIZEM) 60 MG tablet Take 90 mg by mouth 3 times daily       Nutritional Supplements (PEPTAMEN AF) LIQD Take by mouth Indications: TUBE FEED 1500 CALORIES A DAY      ondansetron (ZOFRAN) 8 MG tablet   Take 8 mg by mouth every 8 hours as needed       Cholecalciferol (VITAMIN D-3) 5000 UNITS TABS Take 2,000 Units by mouth daily       flecainide (TAMBOCOR) 50 MG tablet Take 50 mg by mouth daily.  dicyclomine (BENTYL) 20 MG tablet Take 20 mg by mouth every 6 hours as needed. No current facility-administered medications for this visit.      Allergies   Allergen Reactions    Macrodantin [Nitrofurantoin Macrocrystal] Hives    Compazine [Prochlorperazine] Other (See Comments)     HYPER    Phenergan [Promethazine Hcl] Other (See Comments)     HYPER    Reglan [Metoclopramide] Other (See Comments)     HYPER, AGGITATED      Sulfa Antibiotics Rash    Vancomycin Other (See Comments)     HALLUCINATON    Vfend [Voriconazole] Other (See Comments)     hallucinations       Health Maintenance   Topic Date Due    Potassium monitoring  12/09/2020    Flu vaccine (1) 10/16/2021 (Originally 9/1/2021)    Colon cancer screen colonoscopy  01/12/2022    Annual Wellness Visit (AWV)  03/19/2022    Creatinine monitoring  09/13/2022    Breast cancer screen  07/23/2023    DTaP/Tdap/Td vaccine (2 - Td or Tdap) 04/01/2026    Lipid screen  05/28/2026    DEXA (modify frequency per FRAX score)  Completed    Pneumococcal 65+ years Vaccine  Completed    COVID-19 Vaccine  Completed    Hepatitis C screen  Completed    Hepatitis A vaccine  Aged Out    Hepatitis B vaccine  Aged Out    Hib vaccine  Aged Out    Meningococcal (ACWY) vaccine  Aged Out       Subjective:      Review of Systems   Constitutional: Negative for activity change, appetite change, chills, fatigue, fever and unexpected weight change. HENT: Negative for congestion, ear pain, hearing loss, sinus pressure, sore throat and trouble swallowing. Eyes: Negative for visual disturbance. Respiratory: Negative for cough, shortness of breath and wheezing. Cardiovascular: Negative for chest pain, palpitations and leg swelling. Gastrointestinal: Negative for abdominal pain, blood in stool, constipation, diarrhea, nausea and vomiting. PEG   Endocrine: Negative for cold intolerance, heat intolerance, polydipsia, polyphagia and polyuria. Genitourinary: Negative for difficulty urinating, frequency, hematuria and urgency. Musculoskeletal: Negative for arthralgias and myalgias. Skin: Negative for rash. Allergic/Immunologic: Negative for environmental allergies. Neurological: Negative for dizziness, weakness, light-headedness and headaches. Psychiatric/Behavioral: Positive for sleep disturbance. Negative for confusion.  The patient is not nervous/anxious. Objective:     Physical Exam  Constitutional:       Appearance: She is well-developed. HENT:      Head: Normocephalic. Eyes:      Conjunctiva/sclera: Conjunctivae normal.      Pupils: Pupils are equal, round, and reactive to light. Cardiovascular:      Rate and Rhythm: Normal rate and regular rhythm. Heart sounds: Normal heart sounds. No murmur heard. Pulmonary:      Effort: Pulmonary effort is normal.      Breath sounds: Normal breath sounds. No wheezing. Abdominal:      General: Bowel sounds are normal. There is no distension. Palpations: Abdomen is soft. Musculoskeletal:         General: Normal range of motion. Cervical back: Normal range of motion. Skin:     General: Skin is warm and dry. Neurological:      Mental Status: She is alert and oriented to person, place, and time. Psychiatric:         Behavior: Behavior normal.         Thought Content: Thought content normal.         Judgment: Judgment normal.       /70   Pulse 72   Resp 16   Wt 179 lb (81.2 kg)   LMP  (LMP Unknown)   SpO2 99%   BMI 25.68 kg/m²     Assessment:       Diagnosis Orders   1. Essential hypertension     2. Elevated BUN  Comprehensive Metabolic Panel   3. Creatinine elevation  Comprehensive Metabolic Panel   4. Gastroparesis     5. Anxiety     6. Sensorineural hearing loss (SNHL) of right ear with restricted hearing of left ear               Plan:      Return in about 6 months (around 3/16/2022) for hypertension check. HTN-stable on current meds  Elevated BUN, creatinine-abnormalities noted when reviewing chart and MRI per ENT, will repeat labs. Reviewed need to maintain adequate water intake  Gastroparesis-stable and unchanged at this time, she will continue regular follow-up with specialist as planned  Anxiety-Xanax continues to work well for both anxiety and sleep issues as needed  Sensorineural hearing loss-trialing hearing aids, she is considering purchase. Reviewed and discussed recent note and MRI from ENT  Orders Placed This Encounter   Procedures    Comprehensive Metabolic Panel     Standing Status:   Future     Standing Expiration Date:   9/16/2022      No orders of the defined types were placed in this encounter. Patient given educational materials - see patient instructions. Discussed use, benefit, and side effects of prescribed medications. All patientquestions answered. Pt voiced understanding. Reviewed health maintenance. Instructedto continue current medications, diet and exercise. Patient agreed with treatmentplan. Follow up as directed.      Electronicallysigned by TRAY Perera CNP on 9/16/2021 at 1:09 PM

## 2021-11-01 ENCOUNTER — OFFICE VISIT (OUTPATIENT)
Dept: ORTHOPEDIC SURGERY | Age: 71
End: 2021-11-01
Payer: MEDICARE

## 2021-11-01 VITALS — WEIGHT: 179 LBS | HEIGHT: 70 IN | BODY MASS INDEX: 25.62 KG/M2

## 2021-11-01 DIAGNOSIS — M25.512 LEFT SHOULDER PAIN, UNSPECIFIED CHRONICITY: Primary | ICD-10-CM

## 2021-11-01 PROCEDURE — G8427 DOCREV CUR MEDS BY ELIG CLIN: HCPCS | Performed by: ORTHOPAEDIC SURGERY

## 2021-11-01 PROCEDURE — 1123F ACP DISCUSS/DSCN MKR DOCD: CPT | Performed by: ORTHOPAEDIC SURGERY

## 2021-11-01 PROCEDURE — 3017F COLORECTAL CA SCREEN DOC REV: CPT | Performed by: ORTHOPAEDIC SURGERY

## 2021-11-01 PROCEDURE — 20610 DRAIN/INJ JOINT/BURSA W/O US: CPT | Performed by: ORTHOPAEDIC SURGERY

## 2021-11-01 PROCEDURE — G8399 PT W/DXA RESULTS DOCUMENT: HCPCS | Performed by: ORTHOPAEDIC SURGERY

## 2021-11-01 PROCEDURE — G8417 CALC BMI ABV UP PARAM F/U: HCPCS | Performed by: ORTHOPAEDIC SURGERY

## 2021-11-01 PROCEDURE — 1036F TOBACCO NON-USER: CPT | Performed by: ORTHOPAEDIC SURGERY

## 2021-11-01 PROCEDURE — 99213 OFFICE O/P EST LOW 20 MIN: CPT | Performed by: ORTHOPAEDIC SURGERY

## 2021-11-01 PROCEDURE — G8484 FLU IMMUNIZE NO ADMIN: HCPCS | Performed by: ORTHOPAEDIC SURGERY

## 2021-11-01 PROCEDURE — 4040F PNEUMOC VAC/ADMIN/RCVD: CPT | Performed by: ORTHOPAEDIC SURGERY

## 2021-11-01 PROCEDURE — 1090F PRES/ABSN URINE INCON ASSESS: CPT | Performed by: ORTHOPAEDIC SURGERY

## 2021-11-01 RX ORDER — LIDOCAINE HYDROCHLORIDE 10 MG/ML
3 INJECTION, SOLUTION INFILTRATION; PERINEURAL ONCE
Status: COMPLETED | OUTPATIENT
Start: 2021-11-01 | End: 2021-11-01

## 2021-11-01 RX ORDER — TRIAMCINOLONE ACETONIDE 40 MG/ML
40 INJECTION, SUSPENSION INTRA-ARTICULAR; INTRAMUSCULAR ONCE
Status: COMPLETED | OUTPATIENT
Start: 2021-11-01 | End: 2021-11-01

## 2021-11-01 RX ADMIN — TRIAMCINOLONE ACETONIDE 40 MG: 40 INJECTION, SUSPENSION INTRA-ARTICULAR; INTRAMUSCULAR at 11:44

## 2021-11-01 RX ADMIN — LIDOCAINE HYDROCHLORIDE 3 ML: 10 INJECTION, SOLUTION INFILTRATION; PERINEURAL at 11:43

## 2021-11-01 NOTE — LETTER
11/1/2021    Anika Tejada, APRN - CNP  [address] [facility] [address]  [place],  [address] [facility]    RE: Easter Bloodjoanna    Dear Dr. Verla Kawasaki,    Thank you for allowing me to participate in the care of Ms. New. I had the opportunity to evaluate the patient on 11/1/2021. Attached you will find my evaluation and recommendations. Thanks again for the confidence you have expressed in me by allowing my participation in the care of your patient. I will keep you apprised of further developments in the patients treatment course as it progresses. If I can be of further assistance in any fashion, please feel free to contact me at your convenience.     Sincerely,         Althea Arechiga  Shoulder and Elbow Surgery

## 2021-11-01 NOTE — PROGRESS NOTES
Orthopedic Shoulder Encounter Note     Chief complaint: left shoulder pain    HPI: Corrinne Sierras is a 80 y.o.  right-hand dominant female who presents for evaluation of her left shoulder. She decays that she has been having pain now for the past 5 months. She states that it began back in June when she and her  were hanging up some ship lab and her  let go of his and and she was left holding the entire board awkwardly. Since then she has been having pain primarily localized to the lateral aspect of her shoulder. Is usually present with her use but can be bothersome by way of an ache at rest as well. She has positional nighttime pain. In the past week she has had some increase in pain as a result of having to lay on her left side while undergoing a colonoscopy and EGD procedure. She denies having any weakness or stiffness. She was taking Motrin for pain but had to stop as a result of gastritis. Previous treatment:    NSAIDs: Motrin    Physical Therapy: No     Injections: None    Surgeries: None    Review of Systems:   Constitutional: Negative for fever, chills, sweats. Pain Score:   5  Neurological: Negative for headache, numbness, or weakness. Musculoskeletal: As noted in HPI     Past Medical History  Stefan Mendoza  has a past medical history of Abdominal pain, Anxiety, CVID (common variable immunodeficiency) ([address] Depression, Diarrhea, Difficulty swallowing, Dizziness, Fibromyalgia, Gastroparesis, Headache, History of blood transfusion, Hypogammaglobulinaemia, unspecified, Irritable bowel syndrome, Jejunostomy tube present ([address] Nausea & vomiting, Numbness, Postprocedural non-healing wound, RLS (restless legs syndrome), S/P percutaneous endoscopic gastrostomy (PEG) tube placement ([address] Sepsis ([address] Snores, SVT (supraventricular tachycardia) ([address] Wears glasses, and Wears glasses.     Past Surgical History  Stefan Mendoza  has a past surgical history that includes Cholecystectomy (1972); Tonsillectomy and adenoidectomy (1963); Bunionectomy (Right, 1985); knee surgery (Right, 1986); Pyloroplasty (2005); Gastrostomy tube placement (2004); Gastric fundoplication (0109); ileostomy or jejunostomy (2005); Upper gastrointestinal endoscopy (1913-5837); Tunneled venous port placement (2004); Endometrial ablation (2005); Breast biopsy (Left, 8/13/15); Abscess Drainage (03/08/2018); pr office/outpt visit,procedure only (N/A, 3/8/2018); and Gastrostomy tube placement (N/A, 8/19/2019).     Current Medications  Current Outpatient Medications   Medication Sig Dispense Refill    LORazepam (ATIVAN) 1 MG tablet take 1 tablet by mouth every evening 90 tablet 0    losartan (COZAAR) 50 MG tablet Take 50 mg by mouth daily      SYRINGE-NEEDLE, DISP, 3 ML (B-D 3CC LUER-JUSTIN SYR 21GX1\") 21G X 1\" 3 ML MISC use as directed 100 each 3    sertraline (ZOLOFT) 100 MG tablet TAKE 1 AND 1/2 TABLETS BY  MOUTH DAILY (Patient taking differently: Take 100 mg by mouth daily Indications: pt takes 1 tablet daily ) 135 tablet 3    loperamide (IMODIUM) 2 MG capsule TAKE 1 CAPSULE BY MOUTH 4  TIMES DAILY AS NEEDED FOR  DIARRHEA 360 capsule 3    cyanocobalamin 1000 MCG/ML injection INJECT 1 MILLILITER INTRAMUSCULARLY EVERY FIRST OF THE MONTH 10 mL 2    pantoprazole (PROTONIX) 40 MG tablet 2 times daily      ibuprofen (ADVIL;MOTRIN) 600 MG tablet TAKE 1 TABLET BY MOUTH  EVERY 8 HOURS AS NEEDED FOR PAIN 270 tablet 2    butalbital-acetaminophen-caffeine (FIORICET, ESGIC) -40 MG per tablet Take 1 tablet by mouth every 6 hours as needed for Headaches 90 tablet 1    ZINC OXIDE, TOPICAL, 25 % OINT Apply 10 mLs topically 2 times daily 480 g 0    diltiazem (CARDIZEM) 60 MG tablet Take 90 mg by mouth 3 times daily       Nutritional Supplements (PEPTAMEN AF) LIQD Take by mouth Indications: TUBE FEED 1500 CALORIES A DAY      ondansetron (ZOFRAN) 8 MG tablet   Take 8 mg by mouth every 8 hours as needed       Cholecalciferol (VITAMIN D-3) 5000 UNITS TABS Take 2,000 Units by mouth daily       flecainide (TAMBOCOR) 50 MG tablet Take 50 mg by mouth daily.  dicyclomine (BENTYL) 20 MG tablet Take 20 mg by mouth every 6 hours as needed. No current facility-administered medications for this visit. Allergies  Allergies have been reviewed. Stefan Mendoza is allergic to [employer] macrocrystal], compazine [prochlorperazine], phenergan [promethazine hcl], reglan [metoclopramide], sulfa antibiotics, vancomycin, and vfend [voriconazole]. Social History  Stefan Mendoza  reports that she has never smoked. She has never used smokeless tobacco. She reports that she does not drink alcohol and does not use drugs. Family History  Valerie's family history includes Cancer in her brother, maternal grandfather, and maternal grandmother; Colon Cancer in her maternal uncle; Heart Disease in her paternal grandfather; Hypertension in her mother; Stroke in her father. Physical Exam:     Ht 5' 10\" (1.778 m)   Wt 179 lb (81.2 kg)   LMP  (LMP Unknown)   BMI 25.68 kg/m²    Constitutional: Patient is oriented to person, place, and time. Patient appears well-developed and well nourished. Mental Status/psychiatric: Behavior is normal. Thought content normal.  HENT: Negative otherwise noted  Head: Normocephalic and Atraumatic  Nose: Normal  Respiratory/Cardio: Effort normal. No respiratory distress. Gait: normal    Shoulder:    Skin: Skin is warm and dry; no swelling or obvious muscular atrophy.    Vasculature: 2+ radial pulses bilaterally  Neuro: Sensation grossly intact to light touch diffusely  Tenderness: Tender to palpation over the lateral aspect of the left shoulder    ROM: (Degrees)    Right   A P   Left   A P    Elevation  150    Elevation  130 140  Abduction  120    Abduction  100  145  ER   75    ER   55 60  IR   L4    IR   L4   90 abd/ER      90 abd/ER     90 abd/IR      90 abd/IR     Crepitation  No    Crepitation No  Dyskenesia  No    Dyskenesia No      Muscle strength:    Right       Left    Deltoid   5    Deltoid   5  Supraspinatus  5    Supraspinatus  4  ER   5    ER   5  IR   5    IR   5    Special tests    Right   Rotator Cuff    Left    n   Painful arc    y   n   Pain with ER    n    n   Neer's     y    n   Hawkin's    y    n   Drop Arm    n  n   Lift off/Belly Press   n  n   ER Lag    n          [employer] Joint  n   AC tenderness   n  n   Cross-chest adduction  n       Labrum/biceps    n   Marty's    n   n   Biceps sheer    n      n   Speed's/Yergason's   n    n   Tenderness Biceps Groove  n    n   Jack's    n         Instability  n   Ant Apprehension   n    n   Post Apprehension   n    n   Ant Load shift    n    n   Post Load shift   n   n   Sulcus     n  n   Generalized Laxity   n  n   Relocation test   n  n   Crank test     n  n   Juanjose-superior escape  n       Imaging:  Xrays: 4 views of the left shoulder obtained on 11/1/2021 were independently reviewed  Indications: Left shoulder pain  Findings:  Normal glenohumeral and acromioclavicular joint spaces with a type I acromion. Impression: Normal-appearing left shoulder radiographs. Impression/Plan:     Olivia Faria is a 80 y.o. old female with left shoulder pain due to a rotator cuff strain versus tear. I had a discussion with the patient today educating her about this and discussing treatment options available to her including nonoperative and operative intervention. I recommended proceeding conservatively at this time by way of a cortisone injection as well as physical therapy as she is unable to take oral NSAIDs as outlined above. She was amenable to this and had the injection administered as outlined below and a prescription for physical therapy provided.   I anticipate improvement in resolution of her symptoms with this treatment and so I will see her back as needed but she was encouraged to return or call at anytime with persistent or worsening symptoms. I would recommend an MRI study of her shoulder prior to that visit. Procedure: left shoulder subacromial space injection  Following an appropriate discussion with the patient regarding the risks and benefits of the procedure she consented to proceed. her left shoulder was prepped using chlorhexadine solution. Using aseptic technique and through a posterior approach, her left shoulder subacromial space was injected with a 4 cc mixture of 1cc 40mg/ml kenalog and 3 cc of 1% lidocaine without epinephrine. A band aid was applied to the injection site. she tolerated the injection with no immediate adverse reactions. This note is created with the assistance of a speech recognition program.  While intending to generate adocument that actually reflects the content of the visit, the document can still have some errors including those of syntax and sound a like substitutions which may escape proof reading. It such instances, actual meaningcan be extrapolated by contextual diversion.     NA = Not assessed  RTC = Rotator cuff  RCT = Rotator cuff tear  ER = External rotation  IR = Internal rotation  AC = Acromioclavicular  GH = Glenohumeral  n = No  y = Yes

## 2021-11-06 DIAGNOSIS — K90.9 INTESTINAL MALABSORPTION: ICD-10-CM

## 2021-11-06 DIAGNOSIS — K31.84 GASTROPARESIS: ICD-10-CM

## 2021-11-08 RX ORDER — CYANOCOBALAMIN 1000 UG/ML
INJECTION INTRAMUSCULAR; SUBCUTANEOUS
Qty: 10 ML | Refills: 2 | Status: SHIPPED | OUTPATIENT
Start: 2021-11-08 | End: 2022-10-19 | Stop reason: SDUPTHER

## 2021-12-13 RX ORDER — SERTRALINE HYDROCHLORIDE 100 MG/1
TABLET, FILM COATED ORAL
Qty: 135 TABLET | Refills: 3 | Status: SHIPPED | OUTPATIENT
Start: 2021-12-13

## 2022-01-03 ENCOUNTER — PATIENT MESSAGE (OUTPATIENT)
Dept: PRIMARY CARE CLINIC | Age: 72
End: 2022-01-03

## 2022-01-03 DIAGNOSIS — F51.01 PRIMARY INSOMNIA: ICD-10-CM

## 2022-01-03 DIAGNOSIS — F41.9 ANXIETY: Chronic | ICD-10-CM

## 2022-01-03 RX ORDER — LORAZEPAM 1 MG/1
TABLET ORAL
Qty: 90 TABLET | Refills: 0 | Status: SHIPPED | OUTPATIENT
Start: 2022-01-03 | End: 2022-04-19

## 2022-01-03 NOTE — TELEPHONE ENCOUNTER
From: Mann Flowers  To: Alycia Vaughn  Sent: 1/3/2022 11:28 AM EST  Subject: Ativan refill    Could u refill my Ativan. I use [facility] mail order. I tested positive for Covid on New Years Eve. Had cold symptoms headache and fatigue. Already feeling better. I had 3 full doses of moderna so the only thing is my Igg and Igm is getting low again so maybe that is how I got it. I wear a mask yet in the stores. Who knows. My family members dont and they were here at [facility]. Would it be beneficial for me to get antibody tests done in a few weeks. They are talking about a 4 th dose of the vaccine and if I have antibodies I would like to wait. Have a happy new year and I want to thank you for your continued excellent care you have always given me.      Laina Adams

## 2022-01-12 ENCOUNTER — PATIENT MESSAGE (OUTPATIENT)
Dept: PRIMARY CARE CLINIC | Age: 72
End: 2022-01-12

## 2022-01-12 RX ORDER — AMOXICILLIN 875 MG/1
875 TABLET, COATED ORAL 2 TIMES DAILY
Qty: 20 TABLET | Refills: 0 | Status: SHIPPED | OUTPATIENT
Start: 2022-01-12 | End: 2022-01-22

## 2022-01-12 NOTE — TELEPHONE ENCOUNTER
From: Angelina Sotelo  To: Ahmet Rivas  Sent: 1/12/2022 12:04 PM EST  Subject: [place] Cedillo. I took a zpak that roderick had on Dec 31 [facility] I have sinus problems. I tested positive for Covid on January 1. I have taken every kind of sinus pill the pharmacy has. I still have sinus headache and sinus running down my throat. I dont want to come in niharika my igg and igm are just starting on the low side. Also taking triamcinolone nasal spray. I will go a couple days feeling pretty good and then the sinus again. Is there anything I can do without going to urgent care or in office. The health dept said I am not infectious anymore.

## 2022-02-16 ENCOUNTER — HOSPITAL ENCOUNTER (OUTPATIENT)
Age: 72
Discharge: HOME | End: 2022-02-16
Payer: MEDICARE

## 2022-02-16 DIAGNOSIS — R79.9 ELEVATED BUN: ICD-10-CM

## 2022-02-16 DIAGNOSIS — D50.8 OTHER IRON DEFICIENCY ANEMIA: ICD-10-CM

## 2022-02-16 DIAGNOSIS — K90.9 IRON MALABSORPTION: ICD-10-CM

## 2022-02-16 DIAGNOSIS — R79.89 CREATININE ELEVATION: ICD-10-CM

## 2022-02-16 LAB
ABSOLUTE EOS #: 0.19 K/UL (ref 0–0.44)
ABSOLUTE IMMATURE GRANULOCYTE: <0.03 K/UL (ref 0–0.3)
ABSOLUTE LYMPH #: 1.17 K/UL (ref 1.1–3.7)
ABSOLUTE MONO #: 0.55 K/UL (ref 0.1–1.2)
ALBUMIN SERPL-MCNC: 4.1 G/DL (ref 3.5–5.2)
ALBUMIN/GLOBULIN RATIO: 1.9 (ref 1–2.5)
ALP BLD-CCNC: 119 U/L (ref 35–104)
ALT SERPL-CCNC: 15 U/L (ref 5–33)
ANION GAP SERPL CALCULATED.3IONS-SCNC: 12 MMOL/L (ref 9–17)
AST SERPL-CCNC: 14 U/L
BASOPHILS # BLD: 1 % (ref 0–2)
BASOPHILS ABSOLUTE: 0.06 K/UL (ref 0–0.2)
BILIRUB SERPL-MCNC: 0.37 MG/DL (ref 0.3–1.2)
BUN BLDV-MCNC: 15 MG/DL (ref 8–23)
BUN/CREAT BLD: ABNORMAL (ref 9–20)
CALCIUM SERPL-MCNC: 9.2 MG/DL (ref 8.6–10.4)
CHLORIDE BLD-SCNC: 104 MMOL/L (ref 98–107)
CO2: 24 MMOL/L (ref 20–31)
CREAT SERPL-MCNC: 0.84 MG/DL (ref 0.5–0.9)
DIFFERENTIAL TYPE: ABNORMAL
EOSINOPHILS RELATIVE PERCENT: 3 % (ref 1–4)
FERRITIN: 77 UG/L (ref 13–150)
GFR AFRICAN AMERICAN: >60 ML/MIN
GFR NON-AFRICAN AMERICAN: >60 ML/MIN
GFR SERPL CREATININE-BSD FRML MDRD: ABNORMAL ML/MIN/{1.73_M2}
GFR SERPL CREATININE-BSD FRML MDRD: ABNORMAL ML/MIN/{1.73_M2}
GLUCOSE BLD-MCNC: 91 MG/DL (ref 70–99)
HCT VFR BLD CALC: 38.8 % (ref 36.3–47.1)
HEMOGLOBIN: 12.6 G/DL (ref 11.9–15.1)
IMMATURE GRANULOCYTES: 0 %
LYMPHOCYTES # BLD: 20 % (ref 24–43)
MCH RBC QN AUTO: 32.1 PG (ref 25.2–33.5)
MCHC RBC AUTO-ENTMCNC: 32.5 G/DL (ref 28.4–34.8)
MCV RBC AUTO: 99 FL (ref 82.6–102.9)
MONOCYTES # BLD: 9 % (ref 3–12)
NRBC AUTOMATED: 0 PER 100 WBC
PDW BLD-RTO: 14 % (ref 11.8–14.4)
PLATELET # BLD: 275 K/UL (ref 138–453)
PLATELET ESTIMATE: ABNORMAL
PMV BLD AUTO: 11.2 FL (ref 8.1–13.5)
POTASSIUM SERPL-SCNC: 4.4 MMOL/L (ref 3.7–5.3)
RBC # BLD: 3.92 M/UL (ref 3.95–5.11)
RBC # BLD: ABNORMAL 10*6/UL
SEG NEUTROPHILS: 67 % (ref 36–65)
SEGMENTED NEUTROPHILS ABSOLUTE COUNT: 3.99 K/UL (ref 1.5–8.1)
SODIUM BLD-SCNC: 140 MMOL/L (ref 135–144)
TOTAL PROTEIN: 6.3 G/DL (ref 6.4–8.3)
WBC # BLD: 6 K/UL (ref 3.5–11.3)
WBC # BLD: ABNORMAL 10*3/UL

## 2022-02-16 PROCEDURE — 80053 COMPREHEN METABOLIC PANEL: CPT

## 2022-02-16 PROCEDURE — 85025 COMPLETE CBC W/AUTO DIFF WBC: CPT

## 2022-02-16 PROCEDURE — 82728 ASSAY OF FERRITIN: CPT

## 2022-03-17 ENCOUNTER — OFFICE VISIT (OUTPATIENT)
Dept: PRIMARY CARE CLINIC | Age: 72
End: 2022-03-17
Payer: MEDICARE

## 2022-03-17 DIAGNOSIS — D83.9 CVID (COMMON VARIABLE IMMUNODEFICIENCY) (HCC): ICD-10-CM

## 2022-03-17 DIAGNOSIS — F41.9 ANXIETY: Chronic | ICD-10-CM

## 2022-03-17 DIAGNOSIS — I10 ESSENTIAL HYPERTENSION: Primary | Chronic | ICD-10-CM

## 2022-03-17 DIAGNOSIS — K52.9 CHRONIC DIARRHEA: ICD-10-CM

## 2022-03-17 DIAGNOSIS — F51.01 PRIMARY INSOMNIA: ICD-10-CM

## 2022-03-17 DIAGNOSIS — F32.4 MAJOR DEPRESSIVE DISORDER WITH SINGLE EPISODE, IN PARTIAL REMISSION (HCC): ICD-10-CM

## 2022-03-17 DIAGNOSIS — Z63.8 CAREGIVER ROLE STRAIN: ICD-10-CM

## 2022-03-17 DIAGNOSIS — I47.1 SUPRAVENTRICULAR TACHYCARDIA (HCC): ICD-10-CM

## 2022-03-17 DIAGNOSIS — K31.84 GASTROPARESIS: Chronic | ICD-10-CM

## 2022-03-17 PROBLEM — Z93.4 JEJUNOSTOMY TUBE PRESENT (HCC): Chronic | Status: RESOLVED | Noted: 2017-02-07 | Resolved: 2022-03-17

## 2022-03-17 PROBLEM — Z93.1 GASTROINTESTINAL TUBE PRESENT (HCC): Chronic | Status: RESOLVED | Noted: 2017-02-07 | Resolved: 2022-03-17

## 2022-03-17 PROBLEM — Z93.1 PEG (PERCUTANEOUS ENDOSCOPIC GASTROSTOMY) STATUS (HCC): Status: RESOLVED | Noted: 2019-11-07 | Resolved: 2022-03-17

## 2022-03-17 PROCEDURE — G8427 DOCREV CUR MEDS BY ELIG CLIN: HCPCS | Performed by: NURSE PRACTITIONER

## 2022-03-17 PROCEDURE — 1090F PRES/ABSN URINE INCON ASSESS: CPT | Performed by: NURSE PRACTITIONER

## 2022-03-17 PROCEDURE — G8399 PT W/DXA RESULTS DOCUMENT: HCPCS | Performed by: NURSE PRACTITIONER

## 2022-03-17 PROCEDURE — G8417 CALC BMI ABV UP PARAM F/U: HCPCS | Performed by: NURSE PRACTITIONER

## 2022-03-17 PROCEDURE — 1123F ACP DISCUSS/DSCN MKR DOCD: CPT | Performed by: NURSE PRACTITIONER

## 2022-03-17 PROCEDURE — 99214 OFFICE O/P EST MOD 30 MIN: CPT | Performed by: NURSE PRACTITIONER

## 2022-03-17 PROCEDURE — G8484 FLU IMMUNIZE NO ADMIN: HCPCS | Performed by: NURSE PRACTITIONER

## 2022-03-17 PROCEDURE — 3017F COLORECTAL CA SCREEN DOC REV: CPT | Performed by: NURSE PRACTITIONER

## 2022-03-17 PROCEDURE — 4040F PNEUMOC VAC/ADMIN/RCVD: CPT | Performed by: NURSE PRACTITIONER

## 2022-03-17 PROCEDURE — 1036F TOBACCO NON-USER: CPT | Performed by: NURSE PRACTITIONER

## 2022-03-17 RX ORDER — MECLIZINE HCL 12.5 MG/1
TABLET ORAL
COMMUNITY
Start: 2022-03-16

## 2022-03-17 RX ORDER — NYSTATIN AND TRIAMCINOLONE ACETONIDE 100000; 1 [USP'U]/G; MG/G
OINTMENT TOPICAL 2 TIMES DAILY
COMMUNITY
Start: 2022-03-16

## 2022-03-17 RX ORDER — SUCRALFATE ORAL 1 G/10ML
1 SUSPENSION ORAL 4 TIMES DAILY
COMMUNITY
Start: 2022-03-16 | End: 2022-06-10

## 2022-03-17 SDOH — SOCIAL STABILITY - SOCIAL INSECURITY: OTHER SPECIFIED PROBLEMS RELATED TO PRIMARY SUPPORT GROUP: Z63.8

## 2022-03-17 ASSESSMENT — ENCOUNTER SYMPTOMS
SORE THROAT: 0
WHEEZING: 0
COUGH: 0
ABDOMINAL PAIN: 0
TROUBLE SWALLOWING: 0
BLOOD IN STOOL: 0
SHORTNESS OF BREATH: 0
DIARRHEA: 1
CONSTIPATION: 0
VOMITING: 0
SINUS PRESSURE: 0
NAUSEA: 1

## 2022-03-17 ASSESSMENT — PATIENT HEALTH QUESTIONNAIRE - PHQ9
SUM OF ALL RESPONSES TO PHQ QUESTIONS 1-9: 0
2. FEELING DOWN, DEPRESSED OR HOPELESS: 0
1. LITTLE INTEREST OR PLEASURE IN DOING THINGS: 0
SUM OF ALL RESPONSES TO PHQ QUESTIONS 1-9: 0
SUM OF ALL RESPONSES TO PHQ9 QUESTIONS 1 & 2: 0
SUM OF ALL RESPONSES TO PHQ QUESTIONS 1-9: 0
SUM OF ALL RESPONSES TO PHQ QUESTIONS 1-9: 0

## 2022-03-17 NOTE — PROGRESS NOTES
[facility] PRIMARY CARE  [facility] [address] [facility] [address]  [address] [address]  Dept: [phone]  Dept Fax: [phone]    Garret Albarran is a 80 y.o. female who presentstoday for her medical conditions/complaints as noted below. Garret Albarran is c/o of  Chief Complaint   Patient presents with    Follow-up     Routine    Health Maintenance     Colonoscopy scheduled at [facility] in May 2022       HPI:     Here today for follow up  Reports saw GI at [facility] yesterday  Has been patient many years for her gastroparesis post bariatric surgery  Has been having a lot of nausea and diarrhea over the past few months  Started on carafate and they are planning to change her tube feeding formula, expecting to hear from dietician today  She will also be having colonoscopy in a few months    Voicing a lot of frustration regarding caring for her spouse who has Alzheimer's  She states just joined a support group and is hoping this will help her  She talked at length about her frustrations and trouble dealing with the situation, she also has a son and an aging parent that she cares for    Hypertension  This is a chronic problem. The current episode started more than 1 year ago. The problem is controlled. Pertinent negatives include no chest pain, headaches, palpitations, peripheral edema or shortness of breath. Past treatments include angiotensin blockers and calcium channel blockers. The current treatment provides significant improvement. There are no compliance problems. There is no history of CAD/MI or CVA.        No results found for: LABA1C          ( goal A1C is < 7)   No results found for: LABMICR  LDL Cholesterol (mg/dL)   Date Value   2021 107   08/15/2019 107     LDL Calculated (mg/dL)   Date Value   2018 108   2017 107       (goal LDL is <100)   AST (U/L)   Date Value   2022 14     ALT (U/L)   Date Value   2022 15     BUN (mg/dL)   Date Value 02/16/2022 15     BP Readings from Last 3 Encounters:   09/16/21 112/70   06/14/21 (!) 141/82   03/18/21 138/86          ([id number])    Past Medical History:   Diagnosis Date    Abdominal pain     Anxiety     CVID (common variable immunodeficiency) (HCC)     Depression     Diarrhea     Difficulty swallowing     Dizziness     Fibromyalgia     Gastroparesis     Headache     History of blood transfusion     Hypogammaglobulinaemia, unspecified 2013    Irritable bowel syndrome     Jejunostomy tube present ([address]     USES TO FEED SELF , PEPTAMEN AF 1500 BESSY A DAY    Nausea & vomiting     Numbness     Postprocedural non-healing wound 6/26/2019    RLS (restless legs syndrome)     S/P percutaneous endoscopic gastrostomy (PEG) tube placement ([address]     USES TO DRAIN STOMACH    Sepsis ([address] 06/2006    Snores     SVT (supraventricular tachycardia) ([address] 2006    ON RX    Wears glasses     Wears glasses       Past Surgical History:   Procedure Laterality Date    ABSCESS DRAINAGE  03/08/2018     near peg tube- local    BREAST BIOPSY Left 8/13/15    BUNIONECTOMY Right [address]    ENDOMETRIAL ABLATION  2005    GASTRIC FUNDOPLICATION  6007    sx that injured the vagus nerves and caused gastroparesis    GASTROSTOMY TUBE PLACEMENT  2004    for stomach drainage    GASTROSTOMY TUBE PLACEMENT N/A 8/19/2019    EGD WITH GASTROSTOMY TUBE PLACEMENT - GI SCHEDULED performed by Lucita Spangler MD at [address]  2005    J-tube for feeding    KNEE SURGERY Right 1986    tumor    OR OFFICE/OUTPT VISIT,PROCEDURE ONLY N/A 3/8/2018    INCISION AND DRAINAGE ABSCESS NEAR PEG TUBE performed by Murphy Antoine IV, DO at [address]  2005    done to help gastroparesis fr vagus nerve injury   [address]    TUNNELED VENOUS PORT PLACEMENT  2004    REMOVED 2 YRS LATER    UPPER GASTROINTESTINAL ENDOSCOPY  1993-2012    X 15 SOME WITH BX., SOME FOR DILATATION       Family History   Problem Relation Age of Onset    Hypertension Mother     Heart Disease Paternal Grandfather     Stroke Father     Cancer Brother         KIDNEY AND PROSTATE    Colon Cancer Maternal Uncle     Cancer Maternal Grandmother         NON HODGINS LYMPHOMA    Cancer Maternal Grandfather         LUNG AND ESOPHAGEAL          Social History     Tobacco Use    Smoking status: Never Smoker    Smokeless tobacco: Never Used   Substance Use Topics    Alcohol use: No      Current Outpatient Medications   Medication Sig Dispense Refill    meclizine (ANTIVERT) 12.5 MG tablet       sucralfate (CARAFATE) 1 GM/10ML suspension Take 1 g by mouth 4 times daily      LORazepam (ATIVAN) 1 MG tablet take 1 tablet by mouth every evening 90 tablet 0    sertraline (ZOLOFT) 100 MG tablet TAKE 1 AND 1/2 TABLETS BY  MOUTH DAILY (Patient taking differently: Take 100 mg by mouth daily ) 135 tablet 3    cyanocobalamin 1000 MCG/ML injection INJECT 1 MILLILITER INTRAMUSCULARLY EVERY FIRST OF THE MONTH 10 mL 2    losartan (COZAAR) 50 MG tablet Take 50 mg by mouth daily      SYRINGE-NEEDLE, DISP, 3 ML (B-D 3CC LUER-JUSTIN SYR 21GX1\") 21G X 1\" 3 ML MISC use as directed 100 each 3    loperamide (IMODIUM) 2 MG capsule TAKE 1 CAPSULE BY MOUTH 4  TIMES DAILY AS NEEDED FOR  DIARRHEA 360 capsule 3    pantoprazole (PROTONIX) 40 MG tablet 2 times daily      butalbital-acetaminophen-caffeine (FIORICET, ESGIC) -40 MG per tablet Take 1 tablet by mouth every 6 hours as needed for Headaches 90 tablet 1    ZINC OXIDE, TOPICAL, 25 % OINT Apply 10 mLs topically 2 times daily 480 g 0    diltiazem (CARDIZEM) 60 MG tablet Take 90 mg by mouth 3 times daily       Nutritional Supplements (PEPTAMEN AF) LIQD Take by mouth Indications: TUBE FEED 1500 CALORIES A DAY      ondansetron (ZOFRAN) 8 MG tablet   Take 8 mg by mouth every 8 hours as needed       Cholecalciferol (VITAMIN D-3) 5000 UNITS TABS Take 2,000 Units by mouth daily       flecainide (TAMBOCOR) 50 MG tablet Take 50 mg by mouth daily.  nystatin-triamcinolone (MYCOLOG) 997763-8.1 UNIT/GM-% ointment Apply topically 2 times daily (Patient not taking: Reported on 3/17/2022)      mupirocin (BACTROBAN) 2 % ointment Apply topically 2 times daily (Patient not taking: Reported on 3/17/2022)       No current facility-administered medications for this visit. Allergies   Allergen Reactions    Macrodantin [Nitrofurantoin Macrocrystal] Hives    Compazine [Prochlorperazine] Other (See Comments)     HYPER    Phenergan [Promethazine Hcl] Other (See Comments)     HYPER    Reglan [Metoclopramide] Other (See Comments)     HYPER, AGGITATED      Sulfa Antibiotics Rash    Vancomycin Other (See Comments)     HALLUCINATON    Vfend [Voriconazole] Other (See Comments)     hallucinations       Health Maintenance   Topic Date Due    Colorectal Cancer Screen  01/12/2022    COVID-19 Vaccine (4 - Booster for Foster Stacy series) 01/19/2022    Annual Wellness Visit (AWV)  03/19/2022    Flu vaccine (1) 06/30/2022 (Originally 9/1/2021)    Depression Monitoring  09/16/2022    Potassium monitoring  02/16/2023    Creatinine monitoring  02/16/2023    Breast cancer screen  07/23/2023    DTaP/Tdap/Td vaccine (2 - Td or Tdap) 04/01/2026    Lipid screen  05/28/2026    DEXA (modify frequency per FRAX score)  Completed    Pneumococcal 65+ years Vaccine  Completed    Hepatitis C screen  Completed    Hepatitis A vaccine  Aged Out    Hepatitis B vaccine  Aged Out    Hib vaccine  Aged Out    Meningococcal (ACWY) vaccine  Aged Out       Subjective:      Review of Systems   Constitutional: Negative for activity change, appetite change, chills, fatigue, fever and unexpected weight change. HENT: Negative for congestion, ear pain, hearing loss, sinus pressure, sore throat and trouble swallowing. Eyes: Negative for visual disturbance.    Respiratory: Negative for cough, shortness of breath and wheezing. Cardiovascular: Negative for chest pain, palpitations and leg swelling. Gastrointestinal: Positive for diarrhea and nausea. Negative for abdominal pain, blood in stool, constipation and vomiting. J and PEG tubes   Endocrine: Negative for cold intolerance, heat intolerance, polydipsia, polyphagia and polyuria. Genitourinary: Negative for difficulty urinating, frequency, hematuria and urgency. Musculoskeletal: Negative for arthralgias and myalgias. Skin: Negative for rash. Allergic/Immunologic: Negative for environmental allergies. Neurological: Negative for dizziness, weakness, light-headedness and headaches. Psychiatric/Behavioral: Positive for dysphoric mood (Tearful with discussing spouse). Negative for confusion. The patient is not nervous/anxious. Objective:     Physical Exam  Constitutional:       Appearance: She is well-developed. HENT:      Head: Normocephalic. Eyes:      Conjunctiva/sclera: Conjunctivae normal.      Pupils: Pupils are equal, round, and reactive to light. Cardiovascular:      Rate and Rhythm: Normal rate and regular rhythm. Heart sounds: Normal heart sounds. No murmur heard. Pulmonary:      Effort: Pulmonary effort is normal.      Breath sounds: Normal breath sounds. No wheezing. Abdominal:      General: Bowel sounds are normal. There is no distension. Palpations: Abdomen is soft. Musculoskeletal:         General: Normal range of motion. Cervical back: Normal range of motion. Skin:     General: Skin is warm and dry. Neurological:      Mental Status: She is alert and oriented to person, place, and time. Psychiatric:         Behavior: Behavior normal.         Thought Content: Thought content normal.         Judgment: Judgment normal.       LMP  (LMP Unknown)     Assessment:       Diagnosis Orders   1. Essential hypertension     2. CVID (common variable immunodeficiency) ([facility]     3.  Major depressive disorder with single episode, in partial remission ([facility]  [facility])   4. Supraventricular tachycardia ([facility]     5. Chronic diarrhea     6. Primary insomnia     7. Anxiety  [facility])   8. Gastroparesis     9. Caregiver role strain  [facility])             Plan:      Return in about 6 months (around 9/17/2022) for hypertension check, depression/anxiety. Orders Placed This Encounter   Procedures    [facility])     Referral Priority:   Routine     Referral Type:   Behavioral Health     Referral Reason:   Specialty Services Required     Referred to Provider:   GIO Rico     Requested Specialty:   Behavioral Health     Number of Visits Requested:   1     Hypertension-remains well controlled on current medication   CVID-Stable with intermittent IgG infusions  Depression, anxiety, caregiver role strain-lengthy discussion, she is taking zoloft and ativan. Agreeable to referral to counseling, encouraged to continue to pursue Alzheimer's support group  SVT-stable on cardizem  Diarrhea, gastroparesis-reviewed recent visit with GI, she is scheduled for colonoscopy and will cont regular follow up with GI as planned   Patient given educational materials - see patient instructions. Discussed use, benefit, and side effects of prescribed medications. All patientquestions answered. Pt voiced understanding. Reviewed health maintenance. Instructedto continue current medications, diet and exercise. Patient agreed with treatmentplan. Follow up as directed.      Electronicallysigned by TRAY Brunner CNP on 3/17/2022 at 12:54 PM

## 2022-03-31 ENCOUNTER — HOSPITAL ENCOUNTER (OUTPATIENT)
Age: 72
Discharge: HOME | End: 2022-03-31
Payer: MEDICARE

## 2022-03-31 DIAGNOSIS — K90.9 IRON MALABSORPTION: ICD-10-CM

## 2022-03-31 DIAGNOSIS — D50.8 OTHER IRON DEFICIENCY ANEMIA: ICD-10-CM

## 2022-03-31 LAB
ABSOLUTE EOS #: 0.17 K/UL (ref 0–0.44)
ABSOLUTE IMMATURE GRANULOCYTE: <0.03 K/UL (ref 0–0.3)
ABSOLUTE LYMPH #: 1.43 K/UL (ref 1.1–3.7)
ABSOLUTE MONO #: 0.57 K/UL (ref 0.1–1.2)
BASOPHILS # BLD: 1 % (ref 0–2)
BASOPHILS ABSOLUTE: 0.04 K/UL (ref 0–0.2)
EOSINOPHILS RELATIVE PERCENT: 3 % (ref 1–4)
FERRITIN: 65 NG/ML (ref 13–150)
HCT VFR BLD CALC: 37.1 % (ref 36.3–47.1)
HEMOGLOBIN: 12.6 G/DL (ref 11.9–15.1)
IMMATURE GRANULOCYTES: 0 %
LYMPHOCYTES # BLD: 27 % (ref 24–43)
MCH RBC QN AUTO: 31.6 PG (ref 25.2–33.5)
MCHC RBC AUTO-ENTMCNC: 34 G/DL (ref 28.4–34.8)
MCV RBC AUTO: 93 FL (ref 82.6–102.9)
MONOCYTES # BLD: 11 % (ref 3–12)
NRBC AUTOMATED: 0 PER 100 WBC
PDW BLD-RTO: 14.1 % (ref 11.8–14.4)
PLATELET # BLD: 282 K/UL (ref 138–453)
PMV BLD AUTO: 11.3 FL (ref 8.1–13.5)
RBC # BLD: 3.99 M/UL (ref 3.95–5.11)
SEG NEUTROPHILS: 58 % (ref 36–65)
SEGMENTED NEUTROPHILS ABSOLUTE COUNT: 3.05 K/UL (ref 1.5–8.1)
WBC # BLD: 5.3 K/UL (ref 3.5–11.3)

## 2022-03-31 PROCEDURE — 82728 ASSAY OF FERRITIN: CPT

## 2022-03-31 PROCEDURE — 85025 COMPLETE CBC W/AUTO DIFF WBC: CPT

## 2022-03-31 PROCEDURE — 36415 COLL VENOUS BLD VENIPUNCTURE: CPT

## 2022-04-18 ENCOUNTER — OFFICE VISIT (OUTPATIENT)
Dept: BEHAVIORAL/MENTAL HEALTH CLINIC | Age: 72
End: 2022-04-18
Payer: MEDICARE

## 2022-04-18 DIAGNOSIS — F43.22 ADJUSTMENT DISORDER WITH ANXIETY: Primary | ICD-10-CM

## 2022-04-18 PROCEDURE — 90791 PSYCH DIAGNOSTIC EVALUATION: CPT | Performed by: SOCIAL WORKER

## 2022-04-18 PROCEDURE — 1036F TOBACCO NON-USER: CPT | Performed by: SOCIAL WORKER

## 2022-04-19 DIAGNOSIS — F51.01 PRIMARY INSOMNIA: ICD-10-CM

## 2022-04-19 DIAGNOSIS — F41.9 ANXIETY: Chronic | ICD-10-CM

## 2022-04-19 RX ORDER — LORAZEPAM 1 MG/1
TABLET ORAL
Qty: 90 TABLET | Refills: 0 | Status: SHIPPED | OUTPATIENT
Start: 2022-04-19 | End: 2022-07-07

## 2022-04-25 NOTE — PROGRESS NOTES
ADULT BEHAVIORAL HEALTH ASSESSMENT  GIO Rodriguez  Licensed Independent      Visit Date: 4/18/2022   Time of appointment: 10am   Time spent with Patient: 60 minutes. This is patient's first appointment. Reason for Consult:  New Patient     PCP: TRAY Morris CNP      Pt and/or guardian was educated on the model of service to include a short-term intervention focused approach and as well as the limits of confidentiality (i.e. abuse reporting, suicide intervention, etc.). Pt and/or guardian indicated understanding. Pt and/or guardian provided informed consent for the behavioral health program.  Visit completed in person. Patient presented alone. Patient Location: [facility] office, [address] [place] [address]  Provider Location: [facility] office, [address] [place] [address] St is a 80 y.o. female who presents for new evaluation and treatment of anxiety, stress and relationship problems. She reports the following symptoms: anger/irritability, feeling nervous, anxious, or on edge, excessive anxiety and worry about specific stressors and family conflict. Pt states she finds herself being very impatient with both  and mother, easily frustrated and yells at them. She states that both of them tend to place blame and responsibility toward her and time with them is not enjoyable like she would like it to be. She finds herself overwhelmed with the stress of care-taking. Pt has a history of depression but symptoms are managed with medication. Pt is mostly in complaint of stress and interpersonal conflict. Symptoms are causing impairment in her family relationships AEB frequent arguments with  and mother. Onset of symptoms was approximately several months ago. Symptoms occur daily and have been unchanged since onset.   Stressors contributing to the presenting problem include: pt  has been dx with Alzheimer, mother is elderly and needs a lot of assistance - pt is primary caretaker for both     Moises Coto reports that her main concern and focus for the referral to [facility] is to \"be more patient\".     CURRENT MEDICATIONS    Current Outpatient Medications:     LORazepam (ATIVAN) 1 MG tablet, TAKE 1 TABLET BY MOUTH IN  THE EVENING, Disp: 90 tablet, Rfl: 0    meclizine (ANTIVERT) 12.5 MG tablet, , Disp: , Rfl:     sucralfate (CARAFATE) 1 GM/10ML suspension, Take 1 g by mouth 4 times daily, Disp: , Rfl:     nystatin-triamcinolone (MYCOLOG) 570045-3.1 UNIT/GM-% ointment, Apply topically 2 times daily (Patient not taking: Reported on 3/17/2022), Disp: , Rfl:     mupirocin (BACTROBAN) 2 % ointment, Apply topically 2 times daily (Patient not taking: Reported on 3/17/2022), Disp: , Rfl:     sertraline (ZOLOFT) 100 MG tablet, TAKE 1 AND 1/2 TABLETS BY  MOUTH DAILY (Patient taking differently: Take 100 mg by mouth daily ), Disp: 135 tablet, Rfl: 3    cyanocobalamin 1000 MCG/ML injection, INJECT 1 MILLILITER INTRAMUSCULARLY EVERY FIRST OF THE MONTH, Disp: 10 mL, Rfl: 2    losartan (COZAAR) 50 MG tablet, Take 50 mg by mouth daily, Disp: , Rfl:     SYRINGE-NEEDLE, DISP, 3 ML (B-D 3CC LUER-JUSTIN SYR 21GX1\") 21G X 1\" 3 ML MISC, use as directed, Disp: 100 each, Rfl: 3    loperamide (IMODIUM) 2 MG capsule, TAKE 1 CAPSULE BY MOUTH 4  TIMES DAILY AS NEEDED FOR  DIARRHEA, Disp: 360 capsule, Rfl: 3    pantoprazole (PROTONIX) 40 MG tablet, 2 times daily, Disp: , Rfl:     butalbital-acetaminophen-caffeine (FIORICET, ESGIC) -40 MG per tablet, Take 1 tablet by mouth every 6 hours as needed for Headaches, Disp: 90 tablet, Rfl: 1    ZINC OXIDE, TOPICAL, 25 % OINT, Apply 10 mLs topically 2 times daily, Disp: 480 g, Rfl: 0    diltiazem (CARDIZEM) 60 MG tablet, Take 90 mg by mouth 3 times daily , Disp: , Rfl:     Nutritional Supplements (PEPTAMEN AF) LIQD, Take by mouth Indications: TUBE FEED 1500 CALORIES A DAY, Disp: , Rfl:     ondansetron (ZOFRAN) 8 MG tablet,  Take 8 mg by mouth every 8 hours as needed , Disp: , Rfl:     Cholecalciferol (VITAMIN D-3) 5000 UNITS TABS, Take 2,000 Units by mouth daily , Disp: , Rfl:     flecainide (TAMBOCOR) 50 MG tablet, Take 50 mg by mouth daily. , Disp: , Rfl:      FAMILY MEDICAL/MH HISTORY   Her family history includes Cancer in her brother, maternal grandfather, and maternal grandmother; Colon Cancer in her maternal uncle; Heart Disease in her paternal grandfather; Hypertension in her mother; Stroke in her father. [address] reports a previous diagnosis of depression. Previous treatment includes medication only. She is currently taking zoloft and ativan and complains of the following medication side effects: none. [facility] is currently living with . She is not employed - retired. Pt and  have two adult children. Their son lives nearby but has in the past not been dependable in helping out. Their daughter lives out of state. Pt states she has friends and hobbies that she enjoys. She reports no previous history of trauma. Support system: limited, \"I feel like I can't talk to anyone about this\". She is looking into an Alzheimer support group for herself and       DRUG AND ALCOHOL CURRENT USE/HISTORY  TOBACCO:  She reports that she has never smoked. She has never used smokeless tobacco.  ALCOHOL:  She reports no history of alcohol use. OTHER SUBSTANCES: She reports no history of drug use. MENTAL STATUS EXAM  Affective and emotional state appeared anxious. Suicidal ideation was denied. Homicidal ideation was denied. Hygiene was good   Dress was appropriate  Behavior was Calm and engaged  Attitude was Cooperative. Eye-contact was good .   Speech is described as normal rate, normal volume and well articulated  Thought processes were logical without evidence of delusions, hallucinations, obsessions, or dereck  Attention span and concentration appear within functional limits  Language use and knowledge base appear within functional limits  Pt was oriented to person, place, time, and general circumstances with recent memory:  good and remote memory:  good. Insight is estimated to be good AEB a good  understanding of cyclical maladaptive patterns, and the ability to use insight to inform behavior change. Judgment was estimated to be good    PHQ Scores 3/17/2022 9/16/2021 3/18/2021 3/9/2020 10/26/2017   PHQ2 Score 0 0 0 0 0   PHQ9 Score 0 0 0 0 0     Interpretation of Total Score Depression Severity: 1-4 = Minimal depression, 5-9 = Mild depression, 10-14 = Moderate depression, 15-19 = Moderately severe depression, 20-27 = Severe depression      ASSESSMENT  Dylan Lozoya presented to the appointment today for evaluation and treatment of symptoms of anxiety, stress and relationship problems. She is currently deemed no risk to herself or others and meets criteria for:  Adjustment Disorder, with anxiety AEB increased stress and anxiety in caregiving of both  and mother, often irritable, tense, highly anxious. She will benefit from will benefit from brief and solution-focused consultation to address cognitive and behavioral interventions for stress and anxiety symptoms. Mil Brooks and/or guardian were in agreement with recommendations. INTERVENTION  orienting pt to process of brief behavioral health services, completing initial assessment of symptoms and needs, provided recommendations, rapport building and training in communication and interpersonal skills    [address] [place] [address] was seen today for new patient. Diagnoses and all orders for this visit:    Adjustment disorder with anxiety        PLAN  Pt will begin work on identifying needed boundaries/limits for her own self care and implement small ones with mother  Follow up in 2 weeks with [address]  Is interactive complexity present?   No  Reason:  N/A  Additional Supporting Information:  N/A       Electronically signed by [place] Portillo on 4/25/2022 at 11:43 AM

## 2022-05-11 ENCOUNTER — OFFICE VISIT (OUTPATIENT)
Dept: BEHAVIORAL/MENTAL HEALTH CLINIC | Age: 72
End: 2022-05-11
Payer: MEDICARE

## 2022-05-11 DIAGNOSIS — F43.22 ADJUSTMENT DISORDER WITH ANXIETY: Primary | ICD-10-CM

## 2022-05-11 PROCEDURE — 90834 PSYTX W PT 45 MINUTES: CPT | Performed by: SOCIAL WORKER

## 2022-05-11 PROCEDURE — 1036F TOBACCO NON-USER: CPT | Performed by: SOCIAL WORKER

## 2022-05-18 NOTE — PROGRESS NOTES
BEHAVIORAL HEALTH FOLLOW UP  Earl Gomes Consultant      Visit Date: 5/11/2022   Time of appointment:  11am   Time spent with Patient: 50 minutes. This is patient's second appointment. Pt and/or guardian was educated on the model of service to include a short-term intervention focused approach and as well as the limits of confidentiality (i.e. abuse reporting, suicide intervention, etc.). Pt and/or guardian indicated understanding. Pt and/or guardian provided informed consent for the behavioral health program.  Visit completed in person. Patient presented alone. Patient Location: [facility] office, [facility]  Provider Location: [facility] office, [facility]    PCP: TRAY Hall CNP      Reason for Consult: Follow-up    SUBJECTIVE  Matheus Brito is a 80 y.o. female who presents for follow up of anxiety, stress and relationship problems. She reports difficult with having boundaries with mom because of mom's difficulty in respecting them but has set down some new expectations about tasks she does for her mom. Pt states she has been working on letting go of frustrations with 's forgetfulness and is finding some success with this. OBJECTIVE  Affective and emotional state appeared improved. Suicidal thoughts or behaviors not present  Homicidal thoughts or behaviors not present  Hygiene was good   Dress was appropriate  Behavior was Calm and engaged  Attitude was Cooperative. Eye-contact was good . Speech is described as normal rate, normal volume and well articulated  Thought processes were logical without evidence of delusions, hallucinations, obsessions, or dereck  Pt was oriented to person, place, time, and general circumstances with recent memory:  good and remote memory:  good.   Insight and judgment are estimated to be good     PHQ Scores 3/17/2022 9/16/2021 3/18/2021 3/9/2020 10/26/2017   PHQ2 Score 0 0 0 0 0   PHQ9 Score 0 0 0 0 0 Interpretation of Total Score Depression Severity: 1-4 = Minimal depression, 5-9 = Mild depression, 10-14 = Moderate depression, 15-19 = Moderately severe depression, 20-27 = Severe depression    ASSESSMENT  Mimi Centeno presented to the appointment today for follow up and treatment of anxiety, stress and relationship problems. She is currently deemed no risk to self or others. The main focus or themes of the visit today included improvement in communication skills, expression and discussion of unhelpful thinking patterns or schemas and exploration and evaluation of target behaviors for change. Gregor Zayas is making progress with current treatment. She would benefit from further behavioral health consultation to address anxiety, stress and relationship difficulties. Gregor Zayas was in agreement with recommendations. DIAGNOSIS  Gregor Zayas was seen today for follow-up. Diagnoses and all orders for this visit:    Adjustment disorder with anxiety        INTERVENTION  Therapeutic strategies included exploring and encouraging use of practices that can support symptom management and personal growth in daily life , therapeutic feedback regarding development and progress, review of work done by patient between visits and training in communication and interpersonal skills. PLAN  Pt will continue to work on holding boundaries and realistic expectations for family members  Follow up in 2 weeks with [address] [place] [address]  Is interactive complexity present?  No  Reason:  N/A  Additional Supporting Information:  N/A       Electronically signed by Thalia Jolly on 5/18/2022 at 11:49 AM

## 2022-05-31 ENCOUNTER — HOSPITAL ENCOUNTER (EMERGENCY)
Age: 72
Discharge: HOME | End: 2022-05-31
Attending: EMERGENCY MEDICINE
Payer: MEDICARE

## 2022-05-31 ENCOUNTER — APPOINTMENT (OUTPATIENT)
Dept: CT IMAGING | Age: 72
End: 2022-05-31
Payer: MEDICARE

## 2022-05-31 VITALS
TEMPERATURE: 97.6 F | SYSTOLIC BLOOD PRESSURE: 147 MMHG | HEIGHT: 70 IN | BODY MASS INDEX: 25.05 KG/M2 | RESPIRATION RATE: 16 BRPM | OXYGEN SATURATION: 99 % | HEART RATE: 70 BPM | DIASTOLIC BLOOD PRESSURE: 72 MMHG | WEIGHT: 175 LBS

## 2022-05-31 DIAGNOSIS — L03.319 CELLULITIS AND ABSCESS OF TRUNK: Primary | ICD-10-CM

## 2022-05-31 DIAGNOSIS — L02.219 CELLULITIS AND ABSCESS OF TRUNK: Primary | ICD-10-CM

## 2022-05-31 LAB
ABSOLUTE EOS #: 0.17 K/UL (ref 0–0.44)
ABSOLUTE IMMATURE GRANULOCYTE: 0.02 K/UL (ref 0–0.3)
ABSOLUTE LYMPH #: 1.54 K/UL (ref 1.1–3.7)
ABSOLUTE MONO #: 0.63 K/UL (ref 0.1–1.2)
ANION GAP SERPL CALCULATED.3IONS-SCNC: 9 MMOL/L (ref 9–17)
BASOPHILS # BLD: 1 % (ref 0–2)
BASOPHILS ABSOLUTE: 0.04 K/UL (ref 0–0.2)
BUN BLDV-MCNC: 15 MG/DL (ref 8–23)
BUN/CREAT BLD: 16 (ref 9–20)
C-REACTIVE PROTEIN: 16.5 MG/L (ref 0–5)
CALCIUM SERPL-MCNC: 9.1 MG/DL (ref 8.6–10.4)
CHLORIDE BLD-SCNC: 105 MMOL/L (ref 98–107)
CO2: 26 MMOL/L (ref 20–31)
CREAT SERPL-MCNC: 0.96 MG/DL (ref 0.5–0.9)
EOSINOPHILS RELATIVE PERCENT: 3 % (ref 1–4)
GFR AFRICAN AMERICAN: >60 ML/MIN
GFR NON-AFRICAN AMERICAN: 57 ML/MIN
GFR SERPL CREATININE-BSD FRML MDRD: ABNORMAL ML/MIN/{1.73_M2}
GLUCOSE BLD-MCNC: 104 MG/DL (ref 70–99)
HCT VFR BLD CALC: 36.3 % (ref 36.3–47.1)
HEMOGLOBIN: 11.9 G/DL (ref 11.9–15.1)
IMMATURE GRANULOCYTES: 0 %
LACTIC ACID: 1 MMOL/L (ref 0.5–2.2)
LYMPHOCYTES # BLD: 24 % (ref 24–43)
MCH RBC QN AUTO: 31.6 PG (ref 25.2–33.5)
MCHC RBC AUTO-ENTMCNC: 32.8 G/DL (ref 28.4–34.8)
MCV RBC AUTO: 96.3 FL (ref 82.6–102.9)
MONOCYTES # BLD: 10 % (ref 3–12)
NRBC AUTOMATED: 0 PER 100 WBC
PDW BLD-RTO: 13.8 % (ref 11.8–14.4)
PLATELET # BLD: 239 K/UL (ref 138–453)
PMV BLD AUTO: 11.1 FL (ref 8.1–13.5)
POTASSIUM SERPL-SCNC: 4.7 MMOL/L (ref 3.7–5.3)
RBC # BLD: 3.77 M/UL (ref 3.95–5.11)
SEDIMENTATION RATE, ERYTHROCYTE: 9 MM/HR (ref 0–30)
SEG NEUTROPHILS: 62 % (ref 36–65)
SEGMENTED NEUTROPHILS ABSOLUTE COUNT: 3.97 K/UL (ref 1.5–8.1)
SODIUM BLD-SCNC: 140 MMOL/L (ref 135–144)
WBC # BLD: 6.4 K/UL (ref 3.5–11.3)

## 2022-05-31 PROCEDURE — 87070 CULTURE OTHR SPECIMN AEROBIC: CPT

## 2022-05-31 PROCEDURE — 80048 BASIC METABOLIC PNL TOTAL CA: CPT

## 2022-05-31 PROCEDURE — 99285 EMERGENCY DEPT VISIT HI MDM: CPT

## 2022-05-31 PROCEDURE — 96361 HYDRATE IV INFUSION ADD-ON: CPT

## 2022-05-31 PROCEDURE — 74177 CT ABD & PELVIS W/CONTRAST: CPT

## 2022-05-31 PROCEDURE — 6360000004 HC RX CONTRAST MEDICATION: Performed by: PHYSICIAN ASSISTANT

## 2022-05-31 PROCEDURE — 83605 ASSAY OF LACTIC ACID: CPT

## 2022-05-31 PROCEDURE — 85025 COMPLETE CBC W/AUTO DIFF WBC: CPT

## 2022-05-31 PROCEDURE — 87205 SMEAR GRAM STAIN: CPT

## 2022-05-31 PROCEDURE — 86140 C-REACTIVE PROTEIN: CPT

## 2022-05-31 PROCEDURE — 85652 RBC SED RATE AUTOMATED: CPT

## 2022-05-31 PROCEDURE — 2580000003 HC RX 258: Performed by: PHYSICIAN ASSISTANT

## 2022-05-31 PROCEDURE — 96360 HYDRATION IV INFUSION INIT: CPT

## 2022-05-31 RX ORDER — SODIUM CHLORIDE 0.9 % (FLUSH) 0.9 %
10 SYRINGE (ML) INJECTION PRN
Status: DISCONTINUED | OUTPATIENT
Start: 2022-05-31 | End: 2022-05-31 | Stop reason: HOSPADM

## 2022-05-31 RX ORDER — 0.9 % SODIUM CHLORIDE 0.9 %
80 INTRAVENOUS SOLUTION INTRAVENOUS ONCE
Status: DISCONTINUED | OUTPATIENT
Start: 2022-05-31 | End: 2022-05-31 | Stop reason: HOSPADM

## 2022-05-31 RX ORDER — SODIUM CHLORIDE 9 MG/ML
1000 INJECTION, SOLUTION INTRAVENOUS CONTINUOUS
Status: DISCONTINUED | OUTPATIENT
Start: 2022-05-31 | End: 2022-05-31 | Stop reason: HOSPADM

## 2022-05-31 RX ORDER — CLINDAMYCIN HYDROCHLORIDE 300 MG/1
300 CAPSULE ORAL 3 TIMES DAILY
Qty: 30 CAPSULE | Refills: 0 | Status: SHIPPED | OUTPATIENT
Start: 2022-05-31 | End: 2022-06-10

## 2022-05-31 RX ADMIN — Medication 80 ML: at 16:22

## 2022-05-31 RX ADMIN — SODIUM CHLORIDE 1000 ML: 9 INJECTION, SOLUTION INTRAVENOUS at 15:31

## 2022-05-31 RX ADMIN — IOPAMIDOL 75 ML: 755 INJECTION, SOLUTION INTRAVENOUS at 16:22

## 2022-05-31 RX ADMIN — SODIUM CHLORIDE, PRESERVATIVE FREE 10 ML: 5 INJECTION INTRAVENOUS at 16:22

## 2022-05-31 ASSESSMENT — PAIN SCALES - GENERAL: PAINLEVEL_OUTOF10: 2

## 2022-05-31 ASSESSMENT — ENCOUNTER SYMPTOMS: COLOR CHANGE: 1

## 2022-05-31 ASSESSMENT — PAIN - FUNCTIONAL ASSESSMENT: PAIN_FUNCTIONAL_ASSESSMENT: 0-10

## 2022-05-31 NOTE — ED PROVIDER NOTES
eMERGENCY dEPARTMENT eNCOUnter   [address] [place] Name: Michael Obando  MRN: [id number]  [place] [birth year]  Date of evaluation: 5/31/22     Michael Obando is a 80 y.o. female with CC: Abscess (inside peg tube. pt states opened last night and is still draining. pt states looks like possible cellulitits)      This visit was performed by both a physician and an APC. I performed all aspects of the MDM as documented. Based on the medical record the care appears appropriate. I was personally available for consultation in the Emergency Department.     The care is provided during an unprecedented national emergency due to the novel coronavirus, Radha Pacheco MD  Attending Emergency Physician                    Edvin Herron MD  05/31/22 1079

## 2022-05-31 NOTE — ED PROVIDER NOTES
[address] [place] [address] ED  eMERGENCY dEPARTMENT eNCOUnter      Pt Name: Dylan Lozoya  MRN: [id number]  [place] 1950  Date of evaluation: 5/31/22      CHIEF COMPLAINT       Chief Complaint   Patient presents with    Abscess     inside peg tube. pt states opened last night and is still draining. pt states looks like possible cellulitits         HISTORY OF PRESENT ILLNESS    Dylan Lozoya is a 80 y.o. female who presents complaining of cyst some redness and a small amount of drainage near the G-tube insertion site. She has had previous abscess at this area. The history is provided by the patient. Abscess  Location:  Torso  Torso abscess location: abscess near gtube, redness and drainage. Abscess quality: draining, fluctuance, painful and redness    Red streaking: yes    Duration:  3 days  Pain details:     Severity:  Moderate    Duration:  3 days    Timing:  Intermittent    Progression:  Waxing and waning  Chronicity:  Recurrent  Relieved by:  Nothing  Worsened by:  Nothing  Ineffective treatments:  None tried  Risk factors: prior abscess        REVIEW OF SYSTEMS       Review of Systems   Skin: Positive for color change and wound. All other systems reviewed and are negative.       PAST MEDICAL HISTORY     Past Medical History:   Diagnosis Date    Abdominal pain     Anxiety     CVID (common variable immunodeficiency) ([address]     Depression     Diarrhea     Difficulty swallowing     Dizziness     Fibromyalgia     Gastroparesis     Headache     History of blood transfusion     Hypogammaglobulinaemia, unspecified 2013    Irritable bowel syndrome     Jejunostomy tube present (HCC)     USES TO FEED SELF , PEPTAMEN AF 1500 BESSY A DAY    Nausea & vomiting     Numbness     Postprocedural non-healing wound 6/26/2019    RLS (restless legs syndrome)     S/P percutaneous endoscopic gastrostomy (PEG) tube placement (HCC)     USES TO DRAIN STOMACH    Sepsis ([address] 06/2006    Briana MAGALLANES (supraventricular tachycardia) ([facility] 2006    ON RX    Wears glasses     Wears glasses        SURGICAL HISTORY       Past Surgical History:   Procedure Laterality Date    ABSCESS DRAINAGE  03/08/2018     near peg tube- local    BREAST BIOPSY Left 8/13/15    BUNIONECTOMY Right [address]    ENDOMETRIAL ABLATION  2005    GASTRIC FUNDOPLICATION  9344    sx that injured the vagus nerves and caused gastroparesis    GASTROSTOMY TUBE PLACEMENT  2004    for stomach drainage    GASTROSTOMY TUBE PLACEMENT N/A 8/19/2019    EGD WITH GASTROSTOMY TUBE PLACEMENT - GI SCHEDULED performed by Radha Holliday MD at [address]  2005    J-tube for feeding    KNEE SURGERY Right 1986    tumor    GA OFFICE/OUTPT VISIT,PROCEDURE ONLY N/A 3/8/2018    INCISION AND DRAINAGE ABSCESS NEAR PEG TUBE performed by Johana Antoine IV, DO at [address]  2005    done to help gastroparesis fr vagus nerve injury   [address]    TUNNELED VENOUS PORT PLACEMENT  2004    REMOVED 2 YRS LATER    UPPER GASTROINTESTINAL ENDOSCOPY  1993-2012    X 15 SOME WITH BX., SOME FOR DILATATION       CURRENT MEDICATIONS       Previous Medications    BUTALBITAL-ACETAMINOPHEN-CAFFEINE (FIORICET, ESGIC) -40 MG PER TABLET    Take 1 tablet by mouth every 6 hours as needed for Headaches    CHOLECALCIFEROL (VITAMIN D-3) 5000 UNITS TABS    Take 2,000 Units by mouth daily     CYANOCOBALAMIN 1000 MCG/ML INJECTION    INJECT 1 MILLILITER INTRAMUSCULARLY EVERY FIRST OF THE MONTH    DILTIAZEM (CARDIZEM) 60 MG TABLET    Take 90 mg by mouth 3 times daily     FLECAINIDE (TAMBOCOR) 50 MG TABLET    Take 50 mg by mouth daily.     LOPERAMIDE (IMODIUM) 2 MG CAPSULE    TAKE 1 CAPSULE BY MOUTH 4  TIMES DAILY AS NEEDED FOR  DIARRHEA    LOSARTAN (COZAAR) 50 MG TABLET    Take 50 mg by mouth daily    MECLIZINE (ANTIVERT) 12.5 MG TABLET        MUPIROCIN (BACTROBAN) 2 % OINTMENT Apply topically 2 times daily    NUTRITIONAL SUPPLEMENTS (PEPTAMEN AF) LIQD    Take by mouth Indications: TUBE FEED 1500 CALORIES A DAY    NYSTATIN-TRIAMCINOLONE (MYCOLOG) 714203-4.1 UNIT/GM-% OINTMENT    Apply topically 2 times daily    ONDANSETRON (ZOFRAN) 8 MG TABLET      Take 8 mg by mouth every 8 hours as needed     PANTOPRAZOLE (PROTONIX) 40 MG TABLET    2 times daily    SERTRALINE (ZOLOFT) 100 MG TABLET    TAKE 1 AND 1/2 TABLETS BY  MOUTH DAILY    SUCRALFATE (CARAFATE) 1 GM/10ML SUSPENSION    Take 1 g by mouth 4 times daily    SYRINGE-NEEDLE, DISP, 3 ML (B-D 3CC LUER-JUSTIN SYR 21GX1\") 21G X 1\" 3 ML MISC    use as directed    ZINC OXIDE, TOPICAL, 25 % OINT    Apply 10 mLs topically 2 times daily       ALLERGIES     is allergic to macrodantin [nitrofurantoin macrocrystal], compazine [prochlorperazine], phenergan [promethazine hcl], reglan [metoclopramide], sulfa antibiotics, vancomycin, and vfend [voriconazole]. FAMILY HISTORY     She indicated that her mother is alive. She indicated that her father is . She indicated that both of her brothers are alive. She indicated that her maternal grandmother is . She indicated that her maternal grandfather is . She indicated that her paternal grandmother is . She indicated that her paternal grandfather is . She indicated that her maternal uncle is alive. SOCIAL HISTORY      reports that she has never smoked. She has never used smokeless tobacco. She reports that she does not drink alcohol and does not use drugs. PHYSICAL EXAM     INITIAL VITALS: BP (!) 147/72   Pulse 70   Temp 97.6 °F (36.4 °C)   Resp 16   Ht 5' 10\" (1.778 m)   Wt 175 lb (79.4 kg)   LMP  (LMP Unknown)   SpO2 99%   BMI 25.11 kg/m²      Physical Exam  Vitals and nursing note reviewed. Constitutional:       Appearance: She is well-developed. HENT:      Head: Normocephalic and atraumatic.    Cardiovascular:      Rate and Rhythm: Normal rate and regular rhythm. Heart sounds: Normal heart sounds. Pulmonary:      Effort: Pulmonary effort is normal.      Breath sounds: Normal breath sounds. Abdominal:      General: Abdomen is flat. Palpations: Abdomen is soft. Neurological:      Mental Status: She is alert and oriented to person, place, and time. MEDICAL DECISION MAKING:     No definitive abscess noted on CT scan normal labs. She is afebrile she is not tachycardic. Plan at this point will be to start the patient on oral antibiotics take as directed wash area with a mild soap and water once or twice daily and have close follow-up with her primary care provider or her surgeon. If symptoms worsen increased swelling redness drainage fever chills or any other concerns she is instructed to return to the emergency department. Patient verbalized understanding of these discharge instructions she is agreeable with this plan. DIAGNOSTIC RESULTS     EKG: All EKG's are interpreted by the Emergency Department Physician who either signs or Co-signs this chart in the absence of acardiologist.        RADIOLOGY:Allplain film, CT, MRI, and formal ultrasound images (except ED bedside ultrasound) are read by the radiologist and the images and interpretations are directly viewed by the emergency physician. LABS:All lab results were reviewed by myself, and all abnormals are listed below.   Labs Reviewed   CBC WITH AUTO DIFFERENTIAL - Abnormal; Notable for the following components:       Result Value    RBC 3.77 (*)     All other components within normal limits   BASIC METABOLIC PANEL W/ REFLEX TO MG FOR LOW K - Abnormal; Notable for the following components:    Glucose 104 (*)     CREATININE 0.96 (*)     GFR Non- 57 (*)     All other components within normal limits   C-REACTIVE PROTEIN - Abnormal; Notable for the following components:    CRP 16.5 (*)     All other components within normal limits   CULTURE, WOUND   LACTIC ACID   SEDIMENTATION RATE         EMERGENCY DEPARTMENT COURSE:   Vitals:    Vitals:    05/31/22 1427   BP: (!) 147/72   Pulse: 70   Resp: 16   Temp: 97.6 °F (36.4 °C)   SpO2: 99%   Weight: 175 lb (79.4 kg)   Height: 5' 10\" (1.778 m)       The patient was given the following medications while in the emergency department:  Orders Placed This Encounter   Medications    0.9 % sodium chloride infusion    sodium chloride flush 0.9 % injection 10 mL    iopamidol (ISOVUE-370) 76 % injection 75 mL    0.9 % sodium chloride bolus    clindamycin (CLEOCIN) 300 MG capsule     Sig: Take 1 capsule by mouth 3 times daily for 10 days     Dispense:  30 capsule     Refill:  0       -------------------------      CRITICAL CARE:    CONSULTS:  None    PROCEDURES:  Procedures    FINAL IMPRESSION      1.  Cellulitis and abscess of trunk          DISPOSITION/PLAN   DISPOSITION Decision To Discharge 05/31/2022 05:56:42 PM      PATIENT REFERREDTO:  Lulu Elias, [address] [facility] [address] [facility] [address]  [phone]    Schedule an appointment as soon as possible for a visit in 1 day      [facility] ED  [address] [facility]  [phone]    If symptoms worsen      DISCHARGEMEDICATIONS:  New Prescriptions    CLINDAMYCIN (CLEOCIN) 300 MG CAPSULE    Take 1 capsule by mouth 3 times daily for 10 days       (Please note that portions of this note were completed with a voice recognition program.  Efforts were made to edit thedictations but occasionally words are mis-transcribed.)    ALMA Rea PA-C  05/31/22 1806

## 2022-06-01 DIAGNOSIS — K52.9 CHRONIC DIARRHEA: ICD-10-CM

## 2022-06-02 ENCOUNTER — HOSPITAL ENCOUNTER (OUTPATIENT)
Age: 72
Setting detail: SPECIMEN
Discharge: HOME | End: 2022-06-02

## 2022-06-02 DIAGNOSIS — K90.9 IRON MALABSORPTION: ICD-10-CM

## 2022-06-02 DIAGNOSIS — D50.8 OTHER IRON DEFICIENCY ANEMIA: ICD-10-CM

## 2022-06-02 LAB
ABSOLUTE EOS #: 0.2 K/UL (ref 0–0.44)
ABSOLUTE IMMATURE GRANULOCYTE: <0.03 K/UL (ref 0–0.3)
ABSOLUTE LYMPH #: 1.21 K/UL (ref 1.1–3.7)
ABSOLUTE MONO #: 0.42 K/UL (ref 0.1–1.2)
BASOPHILS # BLD: 1 % (ref 0–2)
BASOPHILS ABSOLUTE: 0.05 K/UL (ref 0–0.2)
CULTURE: ABNORMAL
DIRECT EXAM: ABNORMAL
DIRECT EXAM: ABNORMAL
EOSINOPHILS RELATIVE PERCENT: 4 % (ref 1–4)
FERRITIN: 81 NG/ML (ref 13–150)
HCT VFR BLD CALC: 37.4 % (ref 36.3–47.1)
HEMOGLOBIN: 12.4 G/DL (ref 11.9–15.1)
IMMATURE GRANULOCYTES: 0 %
LYMPHOCYTES # BLD: 24 % (ref 24–43)
MCH RBC QN AUTO: 32 PG (ref 25.2–33.5)
MCHC RBC AUTO-ENTMCNC: 33.2 G/DL (ref 28.4–34.8)
MCV RBC AUTO: 96.6 FL (ref 82.6–102.9)
MONOCYTES # BLD: 8 % (ref 3–12)
NRBC AUTOMATED: 0 PER 100 WBC
PDW BLD-RTO: 14 % (ref 11.8–14.4)
PLATELET # BLD: 263 K/UL (ref 138–453)
PMV BLD AUTO: 11.9 FL (ref 8.1–13.5)
RBC # BLD: 3.87 M/UL (ref 3.95–5.11)
SEG NEUTROPHILS: 63 % (ref 36–65)
SEGMENTED NEUTROPHILS ABSOLUTE COUNT: 3.1 K/UL (ref 1.5–8.1)
SPECIMEN DESCRIPTION: ABNORMAL
WBC # BLD: 5 K/UL (ref 3.5–11.3)

## 2022-06-02 RX ORDER — LOPERAMIDE HYDROCHLORIDE 2 MG/1
2 CAPSULE ORAL 4 TIMES DAILY PRN
Qty: 360 CAPSULE | Refills: 3 | Status: SHIPPED | OUTPATIENT
Start: 2022-06-02

## 2022-06-06 PROBLEM — N18.30 CHRONIC RENAL DISEASE, STAGE III (HCC): Status: ACTIVE | Noted: 2022-06-06

## 2022-06-07 ENCOUNTER — OFFICE VISIT (OUTPATIENT)
Dept: BEHAVIORAL/MENTAL HEALTH CLINIC | Age: 72
End: 2022-06-07
Payer: MEDICARE

## 2022-06-07 DIAGNOSIS — F43.22 ADJUSTMENT DISORDER WITH ANXIETY: Primary | ICD-10-CM

## 2022-06-07 PROCEDURE — 1123F ACP DISCUSS/DSCN MKR DOCD: CPT | Performed by: SOCIAL WORKER

## 2022-06-07 PROCEDURE — 90837 PSYTX W PT 60 MINUTES: CPT | Performed by: SOCIAL WORKER

## 2022-06-07 PROCEDURE — 1036F TOBACCO NON-USER: CPT | Performed by: SOCIAL WORKER

## 2022-06-10 ENCOUNTER — HOSPITAL ENCOUNTER (OUTPATIENT)
Facility: MEDICAL CENTER | Age: 72
End: 2022-06-10

## 2022-06-10 ENCOUNTER — TELEPHONE (OUTPATIENT)
Dept: ONCOLOGY | Age: 72
End: 2022-06-10

## 2022-06-10 ENCOUNTER — OFFICE VISIT (OUTPATIENT)
Dept: ONCOLOGY | Age: 72
End: 2022-06-10
Payer: MEDICARE

## 2022-06-10 VITALS
SYSTOLIC BLOOD PRESSURE: 118 MMHG | RESPIRATION RATE: 16 BRPM | DIASTOLIC BLOOD PRESSURE: 65 MMHG | HEART RATE: 65 BPM | WEIGHT: 176.5 LBS | TEMPERATURE: 98 F | BODY MASS INDEX: 25.33 KG/M2

## 2022-06-10 DIAGNOSIS — N18.31 STAGE 3A CHRONIC KIDNEY DISEASE (HCC): ICD-10-CM

## 2022-06-10 DIAGNOSIS — D50.8 OTHER IRON DEFICIENCY ANEMIA: Primary | ICD-10-CM

## 2022-06-10 DIAGNOSIS — K90.9 IRON MALABSORPTION: ICD-10-CM

## 2022-06-10 PROCEDURE — G8399 PT W/DXA RESULTS DOCUMENT: HCPCS | Performed by: INTERNAL MEDICINE

## 2022-06-10 PROCEDURE — 1123F ACP DISCUSS/DSCN MKR DOCD: CPT | Performed by: INTERNAL MEDICINE

## 2022-06-10 PROCEDURE — 3017F COLORECTAL CA SCREEN DOC REV: CPT | Performed by: INTERNAL MEDICINE

## 2022-06-10 PROCEDURE — G8417 CALC BMI ABV UP PARAM F/U: HCPCS | Performed by: INTERNAL MEDICINE

## 2022-06-10 PROCEDURE — 99214 OFFICE O/P EST MOD 30 MIN: CPT | Performed by: INTERNAL MEDICINE

## 2022-06-10 PROCEDURE — 1090F PRES/ABSN URINE INCON ASSESS: CPT | Performed by: INTERNAL MEDICINE

## 2022-06-10 PROCEDURE — 99211 OFF/OP EST MAY X REQ PHY/QHP: CPT

## 2022-06-10 PROCEDURE — G8427 DOCREV CUR MEDS BY ELIG CLIN: HCPCS | Performed by: INTERNAL MEDICINE

## 2022-06-10 PROCEDURE — 99211 OFF/OP EST MAY X REQ PHY/QHP: CPT | Performed by: INTERNAL MEDICINE

## 2022-06-10 PROCEDURE — 1036F TOBACCO NON-USER: CPT | Performed by: INTERNAL MEDICINE

## 2022-06-10 NOTE — PROGRESS NOTES
DIAGNOSIS:   1. Iron deficiency anemia, malabsorption  2. HX gastroparesis, Nissen fundoplication       CURRENT THERAPY:  IV iron - excellent response. BRIEF CASE HISTORY:   Tab Fonseca is a very pleasant 80 y.o. female who is referred to us for anemia. She has history of anemia and has received blood and iron transfusions in the past. She has iron malabsorption component secondary to chronic gastroparesis due to Nissen fundoplication and has PEG tube in place for drainage and Jtube for feeding. She has leg cramps, PICA, and mouth soreness secondary to anemia. She also complains of some abdominal pain. INTERIM HISTORY: The patient presents for follow up for anemia. She has had recent infection around her PEG tube and reports persistent fatigue. She is no longer taking Hizentra. Her  has Alzheimer's and she is struggling emotionally and has started seeing counselor. PAST MEDICAL HISTORY: has a past medical history of Abdominal pain, Anxiety, CVID (common variable immunodeficiency) ([address] Depression, Diarrhea, Difficulty swallowing, Dizziness, Fibromyalgia, Gastroparesis, Headache, History of blood transfusion, Hypogammaglobulinaemia, unspecified, Irritable bowel syndrome, Jejunostomy tube present ([address] Nausea & vomiting, Numbness, Postprocedural non-healing wound, RLS (restless legs syndrome), S/P percutaneous endoscopic gastrostomy (PEG) tube placement ([address] Sepsis ([address] Snores, SVT (supraventricular tachycardia) ([address] Wears glasses, and Wears glasses. PAST SURGICAL HISTORY: has a past surgical history that includes Cholecystectomy (1972); Tonsillectomy and adenoidectomy (1963); Bunionectomy (Right, 1985); knee surgery (Right, 1986); Pyloroplasty (2005); Gastrostomy tube placement (2004); Gastric fundoplication (0905); Jejunostomy (2005); Upper gastrointestinal endoscopy (5484-4457); Tunneled venous port placement (2004); Endometrial ablation (2005);  Breast biopsy (Left, 8/13/15); Abscess Drainage (03/08/2018); pr office/outpt visit,procedure only (N/A, 3/8/2018); and Gastrostomy tube placement (N/A, 8/19/2019). CURRENT MEDICATIONS:  has a current medication list which includes the following prescription(s): loperamide, clindamycin, meclizine, sucralfate, nystatin-triamcinolone, mupirocin, sertraline, cyanocobalamin, losartan, b-d 3cc luer-yoshi syr 21gx1\", pantoprazole, butalbital-acetaminophen-caffeine, zinc oxide (topical), diltiazem, peptamen af, ondansetron, vitamin d-3, and flecainide. ALLERGIES:  is allergic to macrodantin [nitrofurantoin macrocrystal], compazine [prochlorperazine], phenergan [promethazine hcl], reglan [metoclopramide], sulfa antibiotics, vancomycin, and vfend [voriconazole]. FAMILY HISTORY: Negative for any hematological or oncological conditions. SOCIAL HISTORY:  reports that she has never smoked. She has never used smokeless tobacco. She reports that she does not drink alcohol and does not use drugs. REVIEW OF SYSTEMS:   General: No fever or night sweats. Weight is stable. +fatigue   ENT: No double or blurred vision, no tinnitus or hearing problem, no dysphagia or sore throat  Respiratory: No chest pain, no cough or hemoptysis, exertional shortness of breath  Cardiovascular: Denies chest pain, PND or orthopnea. No L E swelling or palpitations. Gastrointestinal: No nausea or vomiting, diarrhea or constipation  Genitourinary: Denies dysuria, hematuria, frequency, urgency or incontinence. Neurological: Denies headaches, decreased LOC, no sensory or motor focal deficits. Musculoskeletal: No arthralgia no back pain or joint swelling. Skin: There are no rashes or bleeding. Psychiatric: No anxiety, no depression. +stress   Endocrine: No diabetes or thyroid disease. Hematologic: No bleeding, no adenopathy. PHYSICAL EXAM: Shows a well appearing 80y.o.-year-old female who is not in pain or distress.  Vital Signs: Weight 176 lb 8 oz (80.1 kg), not currently breastfeeding. HEENT: Angular cheilitis and stomatitis Normocephalic and atraumatic. Pupils are equal, round, reactive to light and accommodation. Extraocular muscles are intact. Neck: Showed no JVD, no carotid bruit . Lungs: Clear to auscultation bilaterally. Heart: Regular without any murmur. Abdomen: Soft, nontender. No hepatosplenomegaly. Extremities: Lower extremities show no edema, clubbing, or cyanosis. Breasts: Examination not done today. Neuro exam: intact cranial nerves bilaterally no motor or sensory deficit, gait is normal. Lymphatic: no adenopathy appreciated in the supraclavicular, axillary, cervical or inguinal area    REVIEW OF LABORATORY DATA:   Lab Results   Component Value Date    WBC 5.0 06/02/2022    HGB 12.4 06/02/2022    HCT 37.4 06/02/2022    MCV 96.6 06/02/2022     06/02/2022       Chemistry        Component Value Date/Time     05/31/2022 1524    K 4.7 05/31/2022 1524     05/31/2022 1524    CO2 26 05/31/2022 1524    BUN 15 05/31/2022 1524    CREATININE 0.96 (H) 05/31/2022 1524        Component Value Date/Time    CALCIUM 9.1 05/31/2022 1524    ALKPHOS 119 (H) 02/16/2022 1128    AST 14 02/16/2022 1128    ALT 15 02/16/2022 1128    BILITOT 0.37 02/16/2022 1128        Lab Results   Component Value Date    IRON 28 05/13/2019    TIBC 389 05/13/2019    FERRITIN 81 06/02/2022         REVIEW OF RADIOLOGICAL RESULTS:     IMPRESSION:   1. Iron deficiency anemia, malabsorption  2. HX gastroparesis   3. IV iron - excellent response    PLAN:   1. Her lab work was reviewed counts and ferritin in range, her IgG levels have started to decrease. 2. We discussed her fatigue and recent infection. 3. 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 4. She is doing well from hematological perspective and we will continue with surveillance. 5. Return in 1 year.      Scribe Attestation   This note was created by Laura Jameson Baldev Romero acting as scribe for the physician signing this note  Electronically Signed  Jil Romero, 6/10/2022  Scribe, [employer]. Attending Attestation   Note was reviewed and edited.   I am in agreement with the note as entered    Demetrius Mckeon MD  Hematologist/Medical [id number] [facility] hematology oncology physicians

## 2022-06-10 NOTE — TELEPHONE ENCOUNTER
Maggie Steinberg MD VISIT  rv in 1 year with cbc, ferritin prior to RV   LABS CDP FERRITIN ORDERS MAILED TO PT TO BE DONE BEFORE RV June 2023  MD VISIT June 2023  AVS PRINTED W/ INSTRUCTIONS AND GIVEN TO PT ON EXIT

## 2022-06-14 NOTE — PROGRESS NOTES
[address] [place] [address] Consultant      Visit Date: 6/7/2022   Time of appointment:  11am   Time spent with Patient: 58 minutes. This is patient's third appointment. Pt and/or guardian was educated on the model of service to include a short-term intervention focused approach and as well as the limits of confidentiality (i.e. abuse reporting, suicide intervention, etc.). Pt and/or guardian indicated understanding. Pt and/or guardian provided informed consent for the behavioral health program.  Visit completed in person. Patient presented alone. Patient Location: [facility], [facility]  Provider Location: [facility], [facility]    PCP: TRAY Kovacs CNP      Reason for Consult: Follow-up      SUBJECTIVE  Shagufta Vegas is a 80 y.o. female who presents for follow up of anxiety, stress and relationship problems. She reports symptoms are improved. States she is finding more ability to handle stress with mother and , accept their limits and respond accordingly with less internal distress. Pt states doing much better and feeling confident in her ability to handle these difficulties at this point. OBJECTIVE  Affective and emotional state appeared generally positive. Suicidal thoughts or behaviors not present  Homicidal thoughts or behaviors not present  Hygiene was good   Dress was appropriate  Behavior was Calm and engaged  Attitude was Cooperative. Eye-contact was good . Speech is described as normal rate, normal volume and well articulated  Thought processes were logical without evidence of delusions, hallucinations, obsessions, or dereck  Pt was oriented to person, place, time, and general circumstances with recent memory:  good and remote memory:  good.   Insight and judgment are estimated to be good     PHQ Scores 3/17/2022 9/16/2021 3/18/2021 3/9/2020 10/26/2017   PHQ2 Score 0 0 0 0 0   PHQ9 Score 0 0 0 0 0 Interpretation of Total Score Depression Severity: 1-4 = Minimal depression, 5-9 = Mild depression, 10-14 = Moderate depression, 15-19 = Moderately severe depression, 20-27 = Severe depression    ASSESSMENT  Silvia Major presented to the appointment today for follow up and treatment of anxiety, stress and relationship problems. She is currently deemed no risk to self or others. The main focus or themes of the visit today included improvement in communication skills, stress management and coping with relationship difficulties. Smitha Delaney shows reduction in symptoms. She is not in need of further behavioral health consultation at this time. Pt can be referred back in the future if needed. Vipul Jones was in agreement with recommendations. DIAGNOSIS  Smitha Delaney was seen today for follow-up. Diagnoses and all orders for this visit:    Adjustment disorder with anxiety        INTERVENTION  Therapeutic strategies included encouragement of expression of emotion, exploring and encouraging use of practices that can support symptom management and personal growth in daily life , engaging pt in identifying their strengths and resources, identifying exceptions to the identified problem, therapeutic feedback regarding development and progress and review of work done by patient between visits. PLAN  Pt will continue to practice skills for communication and managing of her response to stressors. Pt does not need to follow up at this point but can return at any point if needed. INTERACTIVE COMPLEXITY  Is interactive complexity present?  No  Reason:  N/A  Additional Supporting Information:  N/A       Electronically signed by Christopher Méndez on 6/14/2022 at 10:50 AM

## 2022-07-07 DIAGNOSIS — F41.9 ANXIETY: Chronic | ICD-10-CM

## 2022-07-07 DIAGNOSIS — F51.01 PRIMARY INSOMNIA: ICD-10-CM

## 2022-07-07 RX ORDER — LORAZEPAM 1 MG/1
TABLET ORAL
Qty: 90 TABLET | Refills: 0 | Status: SHIPPED | OUTPATIENT
Start: 2022-07-07 | End: 2022-10-17

## 2022-07-15 RX ORDER — SYRINGE WITH NEEDLE, 1 ML 25GX5/8"
SYRINGE, EMPTY DISPOSABLE MISCELLANEOUS
Qty: 100 EACH | Refills: 3 | Status: SHIPPED | OUTPATIENT
Start: 2022-07-15

## 2022-09-15 ENCOUNTER — OFFICE VISIT (OUTPATIENT)
Dept: PRIMARY CARE CLINIC | Age: 72
End: 2022-09-15
Payer: MEDICARE

## 2022-09-15 ENCOUNTER — HOSPITAL ENCOUNTER (OUTPATIENT)
Age: 72
Setting detail: SPECIMEN
Discharge: HOME | End: 2022-09-15

## 2022-09-15 VITALS
OXYGEN SATURATION: 100 % | SYSTOLIC BLOOD PRESSURE: 124 MMHG | BODY MASS INDEX: 25.5 KG/M2 | WEIGHT: 178.13 LBS | DIASTOLIC BLOOD PRESSURE: 82 MMHG | HEIGHT: 70 IN | HEART RATE: 58 BPM

## 2022-09-15 DIAGNOSIS — R30.0 BURNING WITH URINATION: ICD-10-CM

## 2022-09-15 DIAGNOSIS — Z93.4 JEJUNOSTOMY TUBE PRESENT (HCC): ICD-10-CM

## 2022-09-15 DIAGNOSIS — F51.01 PRIMARY INSOMNIA: ICD-10-CM

## 2022-09-15 DIAGNOSIS — Z63.8 CAREGIVER ROLE STRAIN: ICD-10-CM

## 2022-09-15 DIAGNOSIS — L03.311 CELLULITIS OF ABDOMINAL WALL: ICD-10-CM

## 2022-09-15 DIAGNOSIS — K31.6 GASTROCUTANEOUS FISTULA DUE TO GASTROSTOMY TUBE: Chronic | ICD-10-CM

## 2022-09-15 DIAGNOSIS — F41.9 ANXIETY: ICD-10-CM

## 2022-09-15 DIAGNOSIS — F32.4 MAJOR DEPRESSIVE DISORDER WITH SINGLE EPISODE, IN PARTIAL REMISSION (HCC): ICD-10-CM

## 2022-09-15 DIAGNOSIS — Z12.31 ENCOUNTER FOR SCREENING MAMMOGRAM FOR BREAST CANCER: ICD-10-CM

## 2022-09-15 DIAGNOSIS — I10 ESSENTIAL HYPERTENSION: Primary | ICD-10-CM

## 2022-09-15 LAB
BILIRUBIN, POC: NEGATIVE
BLOOD URINE, POC: NEGATIVE
CLARITY, POC: CLEAR
COLOR, POC: YELLOW
GLUCOSE URINE, POC: NEGATIVE
KETONES, POC: NEGATIVE
LEUKOCYTE EST, POC: NEGATIVE
NITRITE, POC: NEGATIVE
PH, POC: 6
PROTEIN, POC: NEGATIVE
SPECIFIC GRAVITY, POC: 1.01
UROBILINOGEN, POC: 0.2

## 2022-09-15 PROCEDURE — G8427 DOCREV CUR MEDS BY ELIG CLIN: HCPCS | Performed by: NURSE PRACTITIONER

## 2022-09-15 PROCEDURE — 3017F COLORECTAL CA SCREEN DOC REV: CPT | Performed by: NURSE PRACTITIONER

## 2022-09-15 PROCEDURE — 99214 OFFICE O/P EST MOD 30 MIN: CPT | Performed by: NURSE PRACTITIONER

## 2022-09-15 PROCEDURE — G8417 CALC BMI ABV UP PARAM F/U: HCPCS | Performed by: NURSE PRACTITIONER

## 2022-09-15 PROCEDURE — 1036F TOBACCO NON-USER: CPT | Performed by: NURSE PRACTITIONER

## 2022-09-15 PROCEDURE — 81002 URINALYSIS NONAUTO W/O SCOPE: CPT | Performed by: NURSE PRACTITIONER

## 2022-09-15 PROCEDURE — 1090F PRES/ABSN URINE INCON ASSESS: CPT | Performed by: NURSE PRACTITIONER

## 2022-09-15 PROCEDURE — G8399 PT W/DXA RESULTS DOCUMENT: HCPCS | Performed by: NURSE PRACTITIONER

## 2022-09-15 PROCEDURE — 1123F ACP DISCUSS/DSCN MKR DOCD: CPT | Performed by: NURSE PRACTITIONER

## 2022-09-15 RX ORDER — CEPHALEXIN 500 MG/1
500 CAPSULE ORAL 3 TIMES DAILY
Qty: 30 CAPSULE | Refills: 0 | Status: SHIPPED | OUTPATIENT
Start: 2022-09-15

## 2022-09-15 SDOH — SOCIAL STABILITY - SOCIAL INSECURITY: OTHER SPECIFIED PROBLEMS RELATED TO PRIMARY SUPPORT GROUP: Z63.8

## 2022-09-15 ASSESSMENT — ENCOUNTER SYMPTOMS
BLOOD IN STOOL: 0
COUGH: 0
ABDOMINAL PAIN: 0
SORE THROAT: 0
VOMITING: 0
CONSTIPATION: 0
SINUS PRESSURE: 0
TROUBLE SWALLOWING: 0
WHEEZING: 0
SHORTNESS OF BREATH: 0
DIARRHEA: 0
NAUSEA: 0

## 2022-09-15 NOTE — PROGRESS NOTES
[address] PRIMARY CARE  [address] [facility]  [address] [address]  Dept: [phone]  Dept Fax: [phone]    William Rothman is a 71 y.o. female who presentstoday for her medical conditions/complaints as noted below. William Rothman is c/o of  Chief Complaint   Patient presents with    Hypertension    Depression     Possible uti burning all the time, abscess broke near tube, sinus issues            HPI:     Here today for follow up  She reports has noticed some burning vaginally over the past couple of days  Does not increase with urination but did a home test yesterday and was positive for leukocytes. She was wondering Vagisil cream might be helpful but would like urine checked    Also had small abscess develop at her j tube site  Broke open last night and is reddened with some streaking near the site  She has history of recurrent site infection  Saw her GI last week and states the provider was concerned about her both of her tube sites and has referred her to stomach therapist for alternative anchoring devices    Reports saw counselor a few times after her last visit and found her to be very helpful  \"Now I just tell myself to not worry about things and feel a lot better\"  Having less irritability with spouse    Hypertension  This is a chronic problem. The current episode started more than 1 year ago. The problem is controlled. Pertinent negatives include no chest pain, headaches, palpitations, peripheral edema or shortness of breath. Risk factors for coronary artery disease include post-menopausal state. Past treatments include angiotensin blockers and calcium channel blockers. The current treatment provides significant improvement. There are no compliance problems. There is no history of CAD/MI or CVA.        No results found for: LABA1C          ( goal A1C is < 7)   No results found for: LABMICR  LDL Cholesterol (mg/dL)   Date Value   2021 107 08/15/2019 107     LDL Calculated (mg/dL)   Date Value   06/28/2018 108   09/20/2017 107       (goal LDL is <100)   AST (U/L)   Date Value   02/16/2022 14     ALT (U/L)   Date Value   02/16/2022 15     BUN (mg/dL)   Date Value   05/31/2022 15     BP Readings from Last 3 Encounters:   09/15/22 124/82   06/10/22 118/65   05/31/22 (!) 147/72          ([id number])    Past Medical History:   Diagnosis Date    Abdominal pain     Anxiety     CVID (common variable immunodeficiency) (HCC)     Depression     Diarrhea     Difficulty swallowing     Dizziness     Fibromyalgia     Gastroparesis     Headache     History of blood transfusion     Hypogammaglobulinaemia, unspecified 2013    Irritable bowel syndrome     Jejunostomy tube present ([address]     USES TO FEED SELF , PEPTAMEN AF 1500 BESSY A DAY    Nausea & vomiting     Numbness     Postprocedural non-healing wound 6/26/2019    RLS (restless legs syndrome)     S/P percutaneous endoscopic gastrostomy (PEG) tube placement ([address]     USES TO DRAIN STOMACH    Sepsis ([address] 06/2006    Snores     SVT (supraventricular tachycardia) ([address] 2006    ON RX    Wears glasses     Wears glasses       Past Surgical History:   Procedure Laterality Date    ABSCESS DRAINAGE  03/08/2018     near peg tube- local    BREAST BIOPSY Left 8/13/15    BUNIONECTOMY Right [address]  2005    GASTRIC FUNDOPLICATION  8834    sx that injured the vagus nerves and caused gastroparesis    GASTROSTOMY TUBE PLACEMENT  2004    for stomach drainage    GASTROSTOMY TUBE PLACEMENT N/A 8/19/2019    EGD WITH GASTROSTOMY TUBE PLACEMENT - GI SCHEDULED performed by Krystle Gallardo MD at [facility]  2005    J-tube for feeding    KNEE SURGERY Right 1986    tumor    [place] OFFICE/OUTPT VISIT,PROCEDURE ONLY N/A 3/8/2018    INCISION AND DRAINAGE ABSCESS NEAR PEG TUBE performed by Hortencia Antoine IV, DO at [address]  2005    done to help gastroparesis fr vagus nerve injury    TONSILLECTOMY AND ADENOIDECTOMY  1963    TUNNELED VENOUS PORT PLACEMENT  2004    REMOVED 2 YRS LATER    UPPER GASTROINTESTINAL ENDOSCOPY  1993-2012    X 15 SOME WITH BX., SOME FOR DILATATION       Family History   Problem Relation Age of Onset    Hypertension Mother     Heart Disease Paternal Grandfather     Stroke Father     Cancer Brother         KIDNEY AND PROSTATE    Colon Cancer Maternal Uncle     Cancer Maternal Grandmother         NON HODGINS LYMPHOMA    Cancer Maternal Grandfather         LUNG AND ESOPHAGEAL          Social History     Tobacco Use    Smoking status: Never    Smokeless tobacco: Never   Substance Use Topics    Alcohol use: No      Current Outpatient Medications   Medication Sig Dispense Refill    cephALEXin (KEFLEX) 500 MG capsule Take 1 capsule by mouth 3 times daily 30 capsule 0    SYRINGE-NEEDLE, DISP, 3 ML (BD PLASTIPAK SYRINGE) 21G X 1\" 3 ML MISC use as directed 100 each 3    LORazepam (ATIVAN) 1 MG tablet TAKE 1 TABLET BY MOUTH IN  THE EVENING 90 tablet 0    loperamide (IMODIUM) 2 MG capsule TAKE 1 CAPSULE BY MOUTH 4  TIMES DAILY AS NEEDED FOR  DIARRHEA 360 capsule 3    meclizine (ANTIVERT) 12.5 MG tablet       nystatin-triamcinolone (MYCOLOG) 641923-3.1 UNIT/GM-% ointment Apply topically 2 times daily       sertraline (ZOLOFT) 100 MG tablet TAKE 1 AND 1/2 TABLETS BY  MOUTH DAILY (Patient taking differently: Take 100 mg by mouth daily ) 135 tablet 3    cyanocobalamin 1000 MCG/ML injection INJECT 1 MILLILITER INTRAMUSCULARLY EVERY FIRST OF THE MONTH 10 mL 2    losartan (COZAAR) 50 MG tablet Take 50 mg by mouth daily      pantoprazole (PROTONIX) 40 MG tablet 2 times daily      butalbital-acetaminophen-caffeine (FIORICET, ESGIC) -40 MG per tablet Take 1 tablet by mouth every 6 hours as needed for Headaches 90 tablet 1    diltiazem (CARDIZEM) 60 MG tablet Take 90 mg by mouth 3 times daily       Nutritional Supplements (PEPTAMEN AF) LIQD Take by mouth Indications: TUBE FEED 1500 CALORIES A DAY      ondansetron (ZOFRAN) 8 MG tablet   Take 8 mg by mouth every 8 hours as needed       Cholecalciferol (VITAMIN D-3) 5000 UNITS TABS Take 2,000 Units by mouth daily       flecainide (TAMBOCOR) 50 MG tablet Take 50 mg by mouth daily. No current facility-administered medications for this visit. Allergies   Allergen Reactions    Macrodantin [Nitrofurantoin Macrocrystal] Hives    Compazine [Prochlorperazine] Other (See Comments)     HYPER    Phenergan [Promethazine Hcl] Other (See Comments)     HYPER    Reglan [Metoclopramide] Other (See Comments)     HYPER, AGGITATED      Sulfa Antibiotics Rash    Vancomycin Other (See Comments)     HALLUCINATON    Vfend [Voriconazole] Other (See Comments)     hallucinations       Health Maintenance   Topic Date Due    COVID-19 Vaccine (4 - Booster for Moderna series) 11/11/2021    Annual Wellness Visit (AWV)  03/19/2022    Flu vaccine (1) 09/01/2022    Depression Monitoring  03/17/2023    Breast cancer screen  07/23/2023    DTaP/Tdap/Td vaccine (2 - Td or Tdap) 04/01/2026    Lipids  05/28/2026    Colorectal Cancer Screen  05/09/2029    DEXA (modify frequency per FRAX score)  Completed    Pneumococcal 65+ years Vaccine  Completed    Hepatitis C screen  Completed    Hepatitis A vaccine  Aged Out    Hepatitis B vaccine  Aged Out    Hib vaccine  Aged Out    Meningococcal (ACWY) vaccine  Aged Out       Subjective:      Review of Systems   Constitutional:  Negative for activity change, appetite change, chills, fatigue, fever and unexpected weight change. HENT:  Negative for congestion, ear pain, hearing loss, sinus pressure, sore throat and trouble swallowing. Eyes:  Negative for visual disturbance. Respiratory:  Negative for cough, shortness of breath and wheezing. Cardiovascular:  Negative for chest pain, palpitations and leg swelling.    Gastrointestinal:  Negative for abdominal pain, blood in stool, constipation, Assessment:       Diagnosis Orders   1. Essential hypertension        2. Encounter for screening mammogram for breast cancer  KASSANDRA DIGITAL SCREEN W OR WO CAD BILATERAL      3. 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 Cellulitis of abdominal wall  cephALEXin (KEFLEX) 500 MG capsule      6. Burning with urination  POCT Urinalysis no Micro    Culture, Urine      7. Anxiety        8. Major depressive disorder with single episode, in partial remission ([address]        9. Primary insomnia        10. Caregiver role strain                  Plan:      Return in about 6 months (around 3/15/2023) for hypertension check. Hypertension-remains well controlled on diltiazem and ARB  J-tube, G-tube, abdominal wall cellulitis-given history of recurrent abscesses will treat with Keflex, she had recent visit with GI and will be following up with stoma specialist at [address] with urination-suspect vaginitis, in office UA was negative but will send for culture. Advised while waiting for culture results to try Vagisil cream 2-3 times daily  Anxiety, depression, difficulty sleeping, caregiver role strain-has completed counseling with good results, at this time she states she feels she is managing well, Ativan working well for sleep, Zoloft keeps her daytime symptoms well controlled    Orders Placed This Encounter   Procedures    Culture, Urine     Standing Status:   Future     Number of Occurrences:   1     Standing Expiration Date:   9/15/2023     Order Specific Question:   Specify (ex-cath, midstream, cysto, etc)?      Answer:   midstream    KASSANDRA DIGITAL SCREEN W OR WO CAD BILATERAL     Standing Status:   Future     Standing Expiration Date:   9/15/2023     Order Specific Question:   Reason for exam:     Answer:   Screening for Breast Cancer    POCT Urinalysis no Micro        Orders Placed

## 2022-09-16 LAB
CULTURE: NO GROWTH
SPECIMEN DESCRIPTION: NORMAL

## 2022-10-06 ENCOUNTER — HOSPITAL ENCOUNTER (OUTPATIENT)
Dept: MAMMOGRAPHY | Age: 72
Discharge: HOME | End: 2022-10-08
Payer: MEDICARE

## 2022-10-06 DIAGNOSIS — Z12.31 ENCOUNTER FOR SCREENING MAMMOGRAM FOR BREAST CANCER: ICD-10-CM

## 2022-10-06 PROCEDURE — 77063 BREAST TOMOSYNTHESIS BI: CPT

## 2022-10-16 DIAGNOSIS — F41.9 ANXIETY: Chronic | ICD-10-CM

## 2022-10-16 DIAGNOSIS — F51.01 PRIMARY INSOMNIA: ICD-10-CM

## 2022-10-17 RX ORDER — LORAZEPAM 1 MG/1
TABLET ORAL
Qty: 90 TABLET | Refills: 0 | Status: SHIPPED | OUTPATIENT
Start: 2022-10-17 | End: 2023-01-17

## 2022-10-19 DIAGNOSIS — K90.9 INTESTINAL MALABSORPTION: ICD-10-CM

## 2022-10-19 DIAGNOSIS — K31.84 GASTROPARESIS: ICD-10-CM

## 2022-10-19 RX ORDER — CYANOCOBALAMIN 1000 UG/ML
1000 INJECTION INTRAMUSCULAR; SUBCUTANEOUS ONCE
Qty: 10 ML | Refills: 5 | Status: SHIPPED | OUTPATIENT
Start: 2022-10-19 | End: 2022-10-19

## 2022-12-20 ENCOUNTER — PATIENT MESSAGE (OUTPATIENT)
Dept: PRIMARY CARE CLINIC | Age: 72
End: 2022-12-20

## 2022-12-20 DIAGNOSIS — J01.40 ACUTE NON-RECURRENT PANSINUSITIS: Primary | ICD-10-CM

## 2022-12-20 RX ORDER — AZITHROMYCIN 250 MG/1
TABLET, FILM COATED ORAL
Qty: 1 PACKET | Refills: 0 | Status: SHIPPED | OUTPATIENT
Start: 2022-12-20

## 2022-12-20 NOTE — TELEPHONE ENCOUNTER
From: Hawa Alonzo  To: Brunilda Paulson  Sent: 12/20/2022 7:43 AM EST  Subject: Sinus infection    Reza Stout. This is week 2 starting with a sinus problem. Cough, nose plugged and teeth hurting snd drainage in my throat. Can you call in a Zpac or do I need to be seen. Home covid tests x 4 are all negative. Been taking aleve cold and sinus 2 times a day. Antelmo Bernabe.

## 2022-12-28 DIAGNOSIS — F41.9 ANXIETY: Chronic | ICD-10-CM

## 2022-12-28 DIAGNOSIS — F51.01 PRIMARY INSOMNIA: ICD-10-CM

## 2022-12-28 RX ORDER — LORAZEPAM 1 MG/1
TABLET ORAL
Qty: 90 TABLET | Refills: 0 | Status: SHIPPED | OUTPATIENT
Start: 2022-12-28 | End: 2023-01-28

## 2022-12-29 NOTE — TELEPHONE ENCOUNTER
Care Transition BPCI-A Episode created.  This patient has been identified as being eligible for the BPCI-A program. A review of the Final DRG and Unplanned Readmission risk score will be completed at discharge.  If the patient is identified upon discharge as high risk for unplanned readmission, the patient will be assigned to a BPCI-A nurse for 90 days follow-up post-discharge.      Health Maintenance   Topic Date Due    Annual Wellness Visit (AWV)  12/09/2021 (Originally 6/17/2019)    Flu vaccine (1) 09/01/2020    Potassium monitoring  12/09/2020    Creatinine monitoring  12/09/2020    Colon cancer screen colonoscopy  01/12/2022    Breast cancer screen  06/09/2022    Lipid screen  08/15/2024    DTaP/Tdap/Td vaccine (2 - Td) 04/01/2026    DEXA (modify frequency per FRAX score)  Completed    Pneumococcal 65+ years Vaccine  Completed    Hepatitis C screen  Completed    Hepatitis A vaccine  Aged Out    Hepatitis B vaccine  Aged Out    Hib vaccine  Aged Out    Meningococcal (ACWY) vaccine  Aged Out             (applicable per patient's age: Cancer Screenings, Depression Screening, Fall Risk Screening, Immunizations)    LDL Cholesterol (mg/dL)   Date Value   08/15/2019 107     LDL Calculated (mg/dL)   Date Value   06/28/2018 108     AST (U/L)   Date Value   08/15/2019 23     ALT (U/L)   Date Value   08/15/2019 13     BUN (mg/dL)   Date Value   06/17/2019 32 (H)      (goal A1C is < 7)   (goal LDL is <100) need 30-50% reduction from baseline     BP Readings from Last 3 Encounters:   06/08/20 (!) 143/80   03/09/20 (!) 142/80   01/23/20 (!) 146/80    (goal /80)      All Future Testing planned in [facility]:  Lab Frequency Next Occurrence   Anaerobic And Aerobic Culture Once 07/20/2020   CBC Auto Differential     Ferritin     CBC Auto Differential     Ferritin         Next Visit Date:  Future Appointments   Date Time Provider Samuel Faulkner   9/10/2020  9:40 AM TRAY Parr - CNP [facility]   12/8/2020 12:45 PM SCHEDULE, [facility]        Last Visit: 3/9/2020    Patient Active Problem List:     Depression     Anxiety     Irritable bowel syndrome     Gastroparesis     Fibromyalgia     Fatigue     Hypogammaglobulinemia ([facility]     Renal insufficiency     Osteopenia     History of stress fracture     Jejunostomy tube present ([facility] Gastrointestinal tube present [facility])     H/O sepsis     Essential hypertension     Primary insomnia     Adverse drug effect     Dry mouth     Family history of other condition     Feeding difficulties     Cellulitis     CVID (common variable immunodeficiency) ([facility]     Hypogammaglobulinemia ([facility]     Gastrocutaneous fistula due to gastrostomy tube     Dehydration     Postprocedural non-healing wound     Gastrostomy complication, unspecified (HCC)     Dysphagia     Gastrocutaneous fistula     Neuropathy     Chronic diarrhea     PEG (percutaneous endoscopic gastrostomy) status (HCC)     Iron deficiency anemia     Iron malabsorption

## 2023-01-09 ENCOUNTER — OFFICE VISIT (OUTPATIENT)
Dept: PRIMARY CARE CLINIC | Age: 73
End: 2023-01-09
Payer: MEDICARE

## 2023-01-09 VITALS
HEART RATE: 78 BPM | SYSTOLIC BLOOD PRESSURE: 110 MMHG | DIASTOLIC BLOOD PRESSURE: 74 MMHG | BODY MASS INDEX: 25.88 KG/M2 | OXYGEN SATURATION: 100 % | WEIGHT: 180.4 LBS

## 2023-01-09 DIAGNOSIS — Z93.4 JEJUNOSTOMY TUBE PRESENT (HCC): ICD-10-CM

## 2023-01-09 DIAGNOSIS — L29.9 PRURITUS: ICD-10-CM

## 2023-01-09 DIAGNOSIS — J32.4 CHRONIC PANSINUSITIS: ICD-10-CM

## 2023-01-09 DIAGNOSIS — D83.9 CVID (COMMON VARIABLE IMMUNODEFICIENCY) (HCC): ICD-10-CM

## 2023-01-09 DIAGNOSIS — I47.1 SUPRAVENTRICULAR TACHYCARDIA (HCC): ICD-10-CM

## 2023-01-09 DIAGNOSIS — I10 ESSENTIAL HYPERTENSION: Primary | Chronic | ICD-10-CM

## 2023-01-09 DIAGNOSIS — N18.31 STAGE 3A CHRONIC KIDNEY DISEASE (HCC): ICD-10-CM

## 2023-01-09 DIAGNOSIS — F51.01 PRIMARY INSOMNIA: ICD-10-CM

## 2023-01-09 DIAGNOSIS — F41.9 ANXIETY: ICD-10-CM

## 2023-01-09 DIAGNOSIS — F32.4 MAJOR DEPRESSIVE DISORDER WITH SINGLE EPISODE, IN PARTIAL REMISSION (HCC): ICD-10-CM

## 2023-01-09 PROBLEM — T50.905A ADVERSE DRUG EFFECT: Status: RESOLVED | Noted: 2018-09-19 | Resolved: 2023-01-09

## 2023-01-09 PROCEDURE — G8427 DOCREV CUR MEDS BY ELIG CLIN: HCPCS | Performed by: NURSE PRACTITIONER

## 2023-01-09 PROCEDURE — 3078F DIAST BP <80 MM HG: CPT | Performed by: NURSE PRACTITIONER

## 2023-01-09 PROCEDURE — 3017F COLORECTAL CA SCREEN DOC REV: CPT | Performed by: NURSE PRACTITIONER

## 2023-01-09 PROCEDURE — G8399 PT W/DXA RESULTS DOCUMENT: HCPCS | Performed by: NURSE PRACTITIONER

## 2023-01-09 PROCEDURE — 1090F PRES/ABSN URINE INCON ASSESS: CPT | Performed by: NURSE PRACTITIONER

## 2023-01-09 PROCEDURE — 99214 OFFICE O/P EST MOD 30 MIN: CPT | Performed by: NURSE PRACTITIONER

## 2023-01-09 PROCEDURE — 3074F SYST BP LT 130 MM HG: CPT | Performed by: NURSE PRACTITIONER

## 2023-01-09 PROCEDURE — G8484 FLU IMMUNIZE NO ADMIN: HCPCS | Performed by: NURSE PRACTITIONER

## 2023-01-09 PROCEDURE — G8417 CALC BMI ABV UP PARAM F/U: HCPCS | Performed by: NURSE PRACTITIONER

## 2023-01-09 PROCEDURE — 1036F TOBACCO NON-USER: CPT | Performed by: NURSE PRACTITIONER

## 2023-01-09 PROCEDURE — 1123F ACP DISCUSS/DSCN MKR DOCD: CPT | Performed by: NURSE PRACTITIONER

## 2023-01-09 RX ORDER — HYDROXYZINE HYDROCHLORIDE 25 MG/1
25 TABLET, FILM COATED ORAL EVERY 8 HOURS PRN
Qty: 90 TABLET | Refills: 3 | Status: SHIPPED | OUTPATIENT
Start: 2023-01-09

## 2023-01-09 SDOH — ECONOMIC STABILITY: FOOD INSECURITY: WITHIN THE PAST 12 MONTHS, YOU WORRIED THAT YOUR FOOD WOULD RUN OUT BEFORE YOU GOT MONEY TO BUY MORE.: NEVER TRUE

## 2023-01-09 SDOH — ECONOMIC STABILITY: FOOD INSECURITY: WITHIN THE PAST 12 MONTHS, THE FOOD YOU BOUGHT JUST DIDN'T LAST AND YOU DIDN'T HAVE MONEY TO GET MORE.: NEVER TRUE

## 2023-01-09 ASSESSMENT — ENCOUNTER SYMPTOMS
DIARRHEA: 0
SINUS PRESSURE: 0
COUGH: 0
CONSTIPATION: 0
SORE THROAT: 0
VOMITING: 0
BLOOD IN STOOL: 0
ABDOMINAL PAIN: 0
WHEEZING: 0
TROUBLE SWALLOWING: 0
SHORTNESS OF BREATH: 0
SINUS PAIN: 0
NAUSEA: 0

## 2023-01-09 ASSESSMENT — PATIENT HEALTH QUESTIONNAIRE - PHQ9
2. FEELING DOWN, DEPRESSED OR HOPELESS: 3
7. TROUBLE CONCENTRATING ON THINGS, SUCH AS READING THE NEWSPAPER OR WATCHING TELEVISION: 1
SUM OF ALL RESPONSES TO PHQ QUESTIONS 1-9: 11
6. FEELING BAD ABOUT YOURSELF - OR THAT YOU ARE A FAILURE OR HAVE LET YOURSELF OR YOUR FAMILY DOWN: 0
9. THOUGHTS THAT YOU WOULD BE BETTER OFF DEAD, OR OF HURTING YOURSELF: 0
5. POOR APPETITE OR OVEREATING: 0
SUM OF ALL RESPONSES TO PHQ QUESTIONS 1-9: 11
4. FEELING TIRED OR HAVING LITTLE ENERGY: 3
SUM OF ALL RESPONSES TO PHQ QUESTIONS 1-9: 11
1. LITTLE INTEREST OR PLEASURE IN DOING THINGS: 1
3. TROUBLE FALLING OR STAYING ASLEEP: 3
SUM OF ALL RESPONSES TO PHQ QUESTIONS 1-9: 11
8. MOVING OR SPEAKING SO SLOWLY THAT OTHER PEOPLE COULD HAVE NOTICED. OR THE OPPOSITE, BEING SO FIGETY OR RESTLESS THAT YOU HAVE BEEN MOVING AROUND A LOT MORE THAN USUAL: 0
SUM OF ALL RESPONSES TO PHQ9 QUESTIONS 1 & 2: 4

## 2023-01-09 ASSESSMENT — SOCIAL DETERMINANTS OF HEALTH (SDOH): HOW HARD IS IT FOR YOU TO PAY FOR THE VERY BASICS LIKE FOOD, HOUSING, MEDICAL CARE, AND HEATING?: NOT HARD AT ALL

## 2023-01-09 NOTE — PROGRESS NOTES
[facility] PRIMARY CARE  [facility] [address] [place]  [address]  [address] [address]  Dept: [phone]  Dept Fax: [phone]    Marcello Andersen is a 71 y.o. female who presentstoday for her medical conditions/complaints as noted below. Marcello Andersen is c/o of  Chief Complaint   Patient presents with    Sinus Problem     x6 weeks. Now improving. Headache, coughing improving. Discuss Medications     Wants to discuss ativan use           HPI:     Here today for follow up  Reports increase in anxiety due to change in health with her elderly parent   was using her xanax more over the past few months  Today she feels she has returned to her baseline  Reports she did several therapy sessions to assist her with her caregiver role strain regarding her spouse and his declining mental status  She did find the therapy to be helpful    Reports was recently treated with antibiotics for sinusitis  . She states she has had a significant improvement however she still has a lot of runny nose. Asking about treatment options, states tried Benadryl but this seems to make her anxious and has difficulty sleeping. When she takes decongestants she has found her blood pressure elevates    Complaining of bilateral foot itching at night  States tried one of her spouses Atarax and found it to be very helpful, she is asking if she could have her own prescription and/for alternative treatment options  Has been applying moisturizer regularly which does help some  Does not develop the symptoms during the day    Hypertension  This is a chronic problem. The current episode started more than 1 year ago. The problem is controlled. Pertinent negatives include no chest pain, headaches, palpitations or shortness of breath. Past treatments include angiotensin blockers and calcium channel blockers. The current treatment provides significant improvement. There are no compliance problems.   There is no history of CAD/MI or CVA.      No results found for: LABA1C          ( goal A1C is < 7)   No results found for: LABMICR  LDL Cholesterol (mg/dL)   Date Value   05/28/2021 107   08/15/2019 107     LDL Calculated (mg/dL)   Date Value   06/28/2018 108   09/20/2017 107       (goal LDL is <100)   AST (U/L)   Date Value   02/16/2022 14     ALT (U/L)   Date Value   02/16/2022 15     BUN (mg/dL)   Date Value   05/31/2022 15     BP Readings from Last 3 Encounters:   01/09/23 110/74   09/15/22 124/82   06/10/22 118/65          (eupe294/80)    Past Medical History:   Diagnosis Date    Abdominal pain     Anxiety     CVID (common variable immunodeficiency) (HCC)     Depression     Diarrhea     Difficulty swallowing     Dizziness     Fibromyalgia     Gastroparesis     Headache     History of blood transfusion     Hypogammaglobulinaemia, unspecified 2013    Irritable bowel syndrome     Jejunostomy tube present ([address]     USES TO FEED SELF , PEPTAMEN AF 1500 BESSY A DAY    Nausea & vomiting     Numbness     Postprocedural non-healing wound 6/26/2019    RLS (restless legs syndrome)     S/P percutaneous endoscopic gastrostomy (PEG) tube placement ([address]     USES TO DRAIN STOMACH    Sepsis ([address] 06/2006    Snores     SVT (supraventricular tachycardia) ([address] 2006    ON RX    Wears glasses     Wears glasses       Past Surgical History:   Procedure Laterality Date    ABSCESS DRAINAGE  03/08/2018     near peg tube- local    BREAST BIOPSY Left 8/13/15    BUNIONECTOMY Right [facility]    ENDOMETRIAL ABLATION  2005    GASTRIC FUNDOPLICATION  7050    sx that injured the vagus nerves and caused gastroparesis    GASTROSTOMY TUBE PLACEMENT  2004    for stomach drainage    GASTROSTOMY TUBE PLACEMENT N/A 8/19/2019    EGD WITH GASTROSTOMY TUBE PLACEMENT - GI SCHEDULED performed by Gisella Hanson MD at [facility]  2005    J-tube for feeding    KNEE SURGERY Right 1986    tumor    [place] OFFICE/OUTPT VISIT,PROCEDURE ONLY N/A 3/8/2018    INCISION AND DRAINAGE ABSCESS NEAR PEG TUBE performed by Brina Antoine IV, DO at [address]  2005    done to help gastroparesis fr vagus nerve injury    [address]    TUNNELED VENOUS PORT PLACEMENT  2004    REMOVED 2 YRS LATER    UPPER GASTROINTESTINAL ENDOSCOPY  1993-2012    X 15 SOME WITH BX., SOME FOR DILATATION       Family History   Problem Relation Age of Onset    Hypertension Mother     Heart Disease Paternal Grandfather     Stroke Father     Cancer Brother         KIDNEY AND PROSTATE    Colon Cancer Maternal Uncle     Cancer Maternal Grandmother         NON HODGINS LYMPHOMA    Cancer Maternal Grandfather         LUNG AND ESOPHAGEAL          Social History     Tobacco Use    Smoking status: Never    Smokeless tobacco: Never   Substance Use Topics    Alcohol use: No      Current Outpatient Medications   Medication Sig Dispense Refill    hydrOXYzine HCl (ATARAX) 25 MG tablet Take 1 tablet by mouth every 8 hours as needed for Itching 90 tablet 3    LORazepam (ATIVAN) 1 MG tablet TAKE 1 TABLET BY MOUTH IN  THE EVENING 90 tablet 0    SYRINGE-NEEDLE, DISP, 3 ML (BD PLASTIPAK SYRINGE) 21G X 1\" 3 ML MISC use as directed 100 each 3    loperamide (IMODIUM) 2 MG capsule TAKE 1 CAPSULE BY MOUTH 4  TIMES DAILY AS NEEDED FOR  DIARRHEA 360 capsule 3    meclizine (ANTIVERT) 12.5 MG tablet       nystatin-triamcinolone (MYCOLOG) 504471-3.1 UNIT/GM-% ointment Apply topically 2 times daily       sertraline (ZOLOFT) 100 MG tablet TAKE 1 AND 1/2 TABLETS BY  MOUTH DAILY (Patient taking differently: Take 100 mg by mouth daily) 135 tablet 3    losartan (COZAAR) 50 MG tablet Take 50 mg by mouth daily      pantoprazole (PROTONIX) 40 MG tablet 2 times daily      butalbital-acetaminophen-caffeine (FIORICET, ESGIC) -40 MG per tablet Take 1 tablet by mouth every 6 hours as needed for Headaches 90 tablet 1    diltiazem (CARDIZEM) 60 MG tablet Take 90 mg by mouth 3 times daily       Nutritional Supplements (PEPTAMEN AF) LIQD Take by mouth Indications: TUBE FEED 1500 CALORIES A DAY      ondansetron (ZOFRAN) 8 MG tablet   Take 8 mg by mouth every 8 hours as needed       Cholecalciferol (VITAMIN D-3) 5000 UNITS TABS Take 2,000 Units by mouth daily       flecainide (TAMBOCOR) 50 MG tablet Take 50 mg by mouth daily. cyanocobalamin 1000 MCG/ML injection Inject 1 mL into the muscle once for 1 dose 10 mL 5     No current facility-administered medications for this visit. Allergies   Allergen Reactions    Macrodantin [Nitrofurantoin Macrocrystal] Hives    Compazine [Prochlorperazine] Other (See Comments)     HYPER    Phenergan [Promethazine Hcl] Other (See Comments)     HYPER    Reglan [Metoclopramide] Other (See Comments)     HYPER, AGGITATED      Sulfa Antibiotics Rash    Vancomycin Other (See Comments)     HALLUCINATON    Vfend [Voriconazole] Other (See Comments)     hallucinations       Health Maintenance   Topic Date Due    Shingles vaccine (2 of 2) 12/07/2021    Annual Wellness Visit (AWV)  01/09/2023    Depression Monitoring  03/17/2023    GFR test (Diabetes, CKD 3-4, OR last GFR 15-59)  05/31/2023    Breast cancer screen  10/06/2024    DTaP/Tdap/Td vaccine (2 - Td or Tdap) 04/01/2026    Lipids  05/28/2026    Colorectal Cancer Screen  05/09/2029    DEXA (modify frequency per FRAX score)  Completed    Flu vaccine  Completed    Pneumococcal 65+ years Vaccine  Completed    COVID-19 Vaccine  Completed    Hepatitis C screen  Completed    Hepatitis A vaccine  Aged Out    Hib vaccine  Aged Out    Meningococcal (ACWY) vaccine  Aged Out       Subjective:      Review of Systems   Constitutional:  Negative for activity change, appetite change, chills, fatigue, fever and unexpected weight change. HENT:  Positive for congestion and postnasal drip. Negative for ear pain, hearing loss, sinus pressure, sinus pain, sore throat and trouble swallowing.     Eyes:  Negative for visual disturbance. Respiratory:  Negative for cough, shortness of breath and wheezing. Cardiovascular:  Negative for chest pain, palpitations and leg swelling. Gastrointestinal:  Negative for abdominal pain, blood in stool, constipation, diarrhea, nausea and vomiting. Jejunostomy   Endocrine: Negative for cold intolerance, heat intolerance, polydipsia, polyphagia and polyuria. Genitourinary:  Negative for difficulty urinating, frequency, hematuria and urgency. Musculoskeletal:  Negative for arthralgias and myalgias. Skin:  Negative for rash. Allergic/Immunologic: Positive for environmental allergies. Neurological:  Negative for dizziness, weakness, light-headedness and headaches. Psychiatric/Behavioral:  Negative for confusion. The patient is not nervous/anxious. Objective:     Physical Exam  Constitutional:       Appearance: Normal appearance. She is well-developed. HENT:      Head: Normocephalic. Eyes:      Conjunctiva/sclera: Conjunctivae normal.      Pupils: Pupils are equal, round, and reactive to light. Cardiovascular:      Rate and Rhythm: Normal rate and regular rhythm. Heart sounds: Normal heart sounds. No murmur heard. Pulmonary:      Effort: Pulmonary effort is normal.      Breath sounds: Normal breath sounds. No wheezing. Abdominal:      General: Bowel sounds are normal. There is no distension. Palpations: Abdomen is soft. Musculoskeletal:         General: Normal range of motion. Cervical back: Normal range of motion. Skin:     General: Skin is warm and dry. Neurological:      Mental Status: She is alert and oriented to person, place, and time. Psychiatric:         Behavior: Behavior normal.         Thought Content: Thought content normal.         Judgment: Judgment normal.     /74   Pulse 78   Wt 180 lb 6.4 oz (81.8 kg)   LMP  (LMP Unknown)   SpO2 100%   BMI 25.88 kg/m²     Assessment:       Diagnosis Orders   1.  Essential hypertension        2. CVID (common variable immunodeficiency) ([facility]        3. Jejunostomy tube present ([facility]        4. Major depressive disorder with single episode, in partial remission ([facility]        5. Supraventricular tachycardia (HCC)        6. Stage 3a chronic kidney disease (HCC)  Comprehensive Metabolic Panel      7. Pruritus  hydrOXYzine HCl (ATARAX) 25 MG tablet      8. Chronic pansinusitis        9. Anxiety        10. Primary insomnia                  Plan:      Return in about 6 months (around 7/9/2023) for anxiety. Hypertension, well controlled on ARB and diltiazem  C VID-remains stable with intermittent effusions IgG  Jejunostomy-tolerating current tube feedings without adverse effects, no recent episodes of cellulitis at stoma site  Depression, anxiety-stable, she has continued sertraline and as needed use of Ativan   SVT-well-controlled with Cardizem  CKD-stable, due for CMP   Pruritus-Atarax at bedtime as needed, also suggested over-the-counter cortisone cream, continue with moisturizers daily  Sinusitis-has improved with antibiotic treatment but remains with some chronic drainage, advised use of Zyrtec or similar over-the-counter antihistamine. Avoid Benadryl. Saline nasal spray and Flonase nasal spray nightly  Insomnia-able to sleep most nights with Ativan and sertraline      Orders Placed This Encounter   Procedures    Comprehensive Metabolic Panel     Standing Status:   Future     Standing Expiration Date:   1/9/2024        Orders Placed This Encounter   Medications    hydrOXYzine HCl (ATARAX) 25 MG tablet     Sig: Take 1 tablet by mouth every 8 hours as needed for Itching     Dispense:  90 tablet     Refill:  3       Patient given educational materials - see patient instructions. Discussed use, benefit, and side effects of prescribed medications. All patientquestions answered. Pt voiced understanding. Reviewed health maintenance.   Instructedto continue current medications, diet and exercise. Patient agreed with treatmentplan. Follow up as directed.      Electronicallysigned by TRAY Spencer CNP on 1/9/2023 at 9:26 PM

## 2023-01-16 DIAGNOSIS — K90.9 IRON MALABSORPTION: Primary | ICD-10-CM

## 2023-01-17 DIAGNOSIS — K90.9 INTESTINAL MALABSORPTION: ICD-10-CM

## 2023-01-17 DIAGNOSIS — K31.84 GASTROPARESIS: ICD-10-CM

## 2023-01-17 RX ORDER — CYANOCOBALAMIN 1000 UG/ML
INJECTION INTRAMUSCULAR; SUBCUTANEOUS
Qty: 10 ML | Refills: 5 | Status: SHIPPED | OUTPATIENT
Start: 2023-01-17

## 2023-02-12 ENCOUNTER — HOSPITAL ENCOUNTER (EMERGENCY)
Age: 73
Discharge: ANOTHER HOSPITAL | End: 2023-02-12
Attending: STUDENT IN AN ORGANIZED HEALTH CARE EDUCATION/TRAINING PROGRAM
Payer: MEDICARE

## 2023-02-12 VITALS
DIASTOLIC BLOOD PRESSURE: 110 MMHG | OXYGEN SATURATION: 97 % | HEART RATE: 75 BPM | SYSTOLIC BLOOD PRESSURE: 139 MMHG | BODY MASS INDEX: 25.05 KG/M2 | HEIGHT: 70 IN | WEIGHT: 175 LBS | RESPIRATION RATE: 12 BRPM | TEMPERATURE: 97.6 F

## 2023-02-12 DIAGNOSIS — T85.528A DISLODGED JEJUNOSTOMY TUBE: Primary | ICD-10-CM

## 2023-02-12 PROCEDURE — 99283 EMERGENCY DEPT VISIT LOW MDM: CPT

## 2023-02-12 PROCEDURE — 6370000000 HC RX 637 (ALT 250 FOR IP): Performed by: STUDENT IN AN ORGANIZED HEALTH CARE EDUCATION/TRAINING PROGRAM

## 2023-02-12 RX ORDER — LIDOCAINE HYDROCHLORIDE 20 MG/ML
5 JELLY TOPICAL ONCE
Status: COMPLETED | OUTPATIENT
Start: 2023-02-12 | End: 2023-02-12

## 2023-02-12 RX ADMIN — LIDOCAINE HYDROCHLORIDE 5 ML: 20 JELLY TOPICAL at 22:47

## 2023-02-12 ASSESSMENT — PAIN - FUNCTIONAL ASSESSMENT: PAIN_FUNCTIONAL_ASSESSMENT: NONE - DENIES PAIN

## 2023-02-13 ENCOUNTER — APPOINTMENT (OUTPATIENT)
Dept: GENERAL RADIOLOGY | Age: 73
End: 2023-02-13
Payer: MEDICARE

## 2023-02-13 ENCOUNTER — HOSPITAL ENCOUNTER (EMERGENCY)
Age: 73
Discharge: HOME | End: 2023-02-13
Attending: EMERGENCY MEDICINE
Payer: MEDICARE

## 2023-02-13 VITALS
SYSTOLIC BLOOD PRESSURE: 174 MMHG | OXYGEN SATURATION: 99 % | DIASTOLIC BLOOD PRESSURE: 84 MMHG | TEMPERATURE: 97.2 F | RESPIRATION RATE: 16 BRPM | HEART RATE: 80 BPM

## 2023-02-13 DIAGNOSIS — Z43.1 ATTENTION TO G-TUBE (HCC): Primary | ICD-10-CM

## 2023-02-13 PROCEDURE — 49465 FLUORO EXAM OF G/COLON TUBE: CPT

## 2023-02-13 PROCEDURE — 99283 EMERGENCY DEPT VISIT LOW MDM: CPT

## 2023-02-13 PROCEDURE — 6360000004 HC RX CONTRAST MEDICATION: Performed by: STUDENT IN AN ORGANIZED HEALTH CARE EDUCATION/TRAINING PROGRAM

## 2023-02-13 RX ADMIN — DIATRIZOATE MEGLUMINE AND DIATRIZOATE SODIUM 30 ML: 660; 100 LIQUID ORAL; RECTAL at 02:41

## 2023-02-13 ASSESSMENT — ENCOUNTER SYMPTOMS
SHORTNESS OF BREATH: 0
VOMITING: 0
COUGH: 0
NAUSEA: 0
ABDOMINAL PAIN: 0

## 2023-02-13 NOTE — ED NOTES
Pt ambulatory to the bathroom with steady gait. Surgery at bedside.      Sophie Orosco RN  02/13/23 [id number]

## 2023-02-13 NOTE — ED PROVIDER NOTES
[facility] [facility]     Emergency Department     Faculty Attestation        I performed a history and physical examination of the patient and discussed management with the resident. I reviewed the residents note and agree with the documented findings and plan of care. Any areas of disagreement are noted on the chart. I was personally present for the key portions of any procedures. I have documented in the chart those procedures where I was not present during the key portions. I have reviewed the emergency nurses triage note. I agree with the chief complaint, past medical history, past surgical history, allergies, medications, social and family history as documented unless otherwise noted below. For Physician Assistant/ Nurse Practitioner cases/documentation I have personally evaluated this patient and have completed at least one if not all key elements of the E/M (history, physical exam, and MDM). Additional findings are as noted. Vital Signs: BP: (!) 174/84  Heart Rate: 80  Resp: 16  Temp: 97.2 °F (36.2 °C) SpO2: 99 %  PCP:  TRAY Arizmendi - CNP    Pertinent Comments:       Patient is a 67-year-old female with GJ tube in place with G-tube in place however J-tube is out. Has been transferred from [facility] because they cannot replace there after attempts that were unsuccessful. Patient is here for general surgery evaluation saying that she follows with Gutierrez Antoine. Critical Care  None      (Please note that portions of this note were completed with a voice recognition program. Efforts were made to edit the dictations but occasionally words are mis-transcribed.  Whenever words are used in this note in any gender, they shall be construed as though they were used in the gender appropriate to the circumstances; and whenever words are used in this note in the singular or plural form, they shall be construed as though they were used in the form appropriate to the circumstances.)    MD Erika Rolle  Attending Emergency Medicine Physician            Sravani Vigil MD  02/13/23 [id number]

## 2023-02-13 NOTE — ED PROVIDER NOTES
Faculty Sign-Out Attestation  Handoff taken on the following patient from prior Attending Physician: Hans Carmona    I was available and discussed any additional care issues that arose and coordinated the management plans with the resident(s) caring for the patient during my duty period. Any areas of disagreement with residents documentation of care or procedures are noted on the chart. I was personally present for the key portions of any/all procedures during my duty period. I have documented in the chart those procedures where I was not present during the key portions.     J tube dislodged, // transferred from [facility],     Gabriela Reyes,   Attending Physician       Gabriela Reyes,   02/13/23 7727    Surgery saw pt / J tube in good position on xr     Gabriela Reyes, DO  02/13/23 [id number]

## 2023-02-13 NOTE — CONSULTS
[facility] SURGERY  CONSULT NOTE    PATIENT: Yelena Ashton           :   MRN: [id number]  ADMISSION DATE: 2023 12:28 AM   TODAY'S DATE: 23   LOS: 0     ATTENDING PHYSICIAN: Sharon Sales DO  CONSULTING PHYSICIAN:     REASON FOR CONSULT:  Dislodged J-tube    HISTORY OF PRESENT ILLNESS:  71years old female with long-lasting history of G-tube and J-tube present with J-tube dislodged. Her J-tube balloon normally last 4 to 5 weeks and he will break. And she had multiple previous history of replacing the J-tube by herself. Per patient, her J-tube dislodged during this afternoon, she was trying to put it back but was unsuccessful. Her  tried it and it was again unsuccessful as they felt something stuck and felt resistance when replacing the tube. Therefore, patient decided to go to the [facility] for replacement of J-tube. However, the medical staff in [facility] was not able to help patient placing the J-tube back. Therefore, patient was sent to [place].  Patient denies chest pain, shortness of breath or abdominal pain.       PAST MEDICAL HISTORY:        Diagnosis Date    Abdominal pain     Anxiety     CVID (common variable immunodeficiency) (HCC)     Depression     Diarrhea     Difficulty swallowing     Dizziness     Fibromyalgia     Gastroparesis     Headache     History of blood transfusion     Hypogammaglobulinaemia, unspecified     Irritable bowel syndrome     Jejunostomy tube present ([facility]     USES TO FEED SELF , PEPTAMEN AF 1500 BESSY A DAY    Nausea & vomiting     Numbness     Postprocedural non-healing wound 2019    RLS (restless legs syndrome)     S/P percutaneous endoscopic gastrostomy (PEG) tube placement ([address]     USES TO DRAIN STOMACH    Sepsis ([address] 2006    Snores     SVT (supraventricular tachycardia) ([address]     ON RX    Wears glasses     Wears glasses        PAST SURGICAL HISTORY:        Procedure Laterality Date    ABSCESS DRAINAGE 03/08/2018     near peg tube- local    BREAST BIOPSY Left 8/13/15    BUNIONECTOMY Right [address]    ENDOMETRIAL ABLATION  2005    GASTRIC FUNDOPLICATION  3533    sx that injured the vagus nerves and caused gastroparesis    GASTROSTOMY TUBE PLACEMENT  2004    for stomach drainage    GASTROSTOMY TUBE PLACEMENT N/A 8/19/2019    EGD WITH GASTROSTOMY TUBE PLACEMENT - GI SCHEDULED performed by Isidoro Monroy MD at [facility]  2005    J-tube for feeding    KNEE SURGERY Right 1986    tumor    [place] OFFICE/OUTPT VISIT,PROCEDURE ONLY N/A 3/8/2018    INCISION AND DRAINAGE ABSCESS NEAR PEG TUBE performed by Josh Antoine IV,  at [address] [place] [address]  2005    done to help gastroparesis fr vagus nerve injury    [address]    TUNNELED VENOUS PORT PLACEMENT  2004    REMOVED 2 YRS LATER    UPPER GASTROINTESTINAL ENDOSCOPY  1993-2012    X 15 SOME WITH BX., SOME FOR DILATATION       ALLERGIES:    Macrodantin [nitrofurantoin macrocrystal], Compazine [prochlorperazine], Phenergan [promethazine hcl], Reglan [metoclopramide], Sulfa antibiotics, Vancomycin, and Vfend [voriconazole]    SOCIAL HISTORY   Social History     Tobacco Use    Smoking status: Never    Smokeless tobacco: Never   Vaping Use    Vaping Use: Never used   Substance Use Topics    Alcohol use: No    Drug use: No        FAMILY HISTORY:       Problem Relation Age of Onset    Hypertension Mother     Heart Disease Paternal Grandfather     Stroke Father     Cancer Brother         KIDNEY AND PROSTATE    Colon Cancer Maternal Uncle     Cancer Maternal Grandmother         NON HODGINS LYMPHOMA    Cancer Maternal Grandfather         LUNG AND ESOPHAGEAL       MEDICATIONS PRIOR TO ADMISSION:   Not in a hospital admission.     REVIEW OF SYSTEMS:    Constitutional: Denies weight loss, fever and chills  Head: Denies headache and head trauma  EENT: Denies changes in vision and hearing, Denies rhinorrhea and epistaxis  Mouth/Throat: Denies sore throat and hoarseness. Neck: Denies cervicalgia or neck swelling  CV: Denies palpitations and CP  Resp: Denies SOB and cough  GI: Denies abdominal pain, nausea, vomiting and diarrhea  : Denies dysuria and urinary frequency  MSK: Denies myalgia and joint pain  Neuro: Denies syncope and weakness. Skin: Denies rash and pruritus  Psych: Denies recent changes in mood. Denies anxiety and depression    PHYSICAL EXAM:    VITALS  Temp  Av.4 °F (36.3 °C)  Min: 97.2 °F (36.2 °C)  Max: 97.6 °F (10.9 °C)  Systolic (42LXH), YCH:616 , Min:139 , DS   Diastolic (44QBY), JOSE:00, Min:84, Max:110  Pulse  Av.5  Min: 75  Max: 80  Resp  Av  Min: 12  Max: 16  SpO2: 99 % SpO2  Av %  Min: 97 %  Max: 99 %     General: No acute distress. A&Ox3. Head: NC/AT. EENT: Conjunctiva moist without icterus. Ears are symmetric. Normal auditory acuity. Nares are patent. No rhinorrhea. Mouth/Throat: Oral mucus membranes are moist.  Neck:  Supple. No LAD. Cardiovascular:  Regular rate and rhythm. Warm, well perfused. Respiratory:  Equal chest rise bilaterally. No wheezes, stridor, or increased work of breathing. Abdomen: G-tube is intact, J-tube is dislodged, the J-tube site is lateral at to approximately 0.2 cm   neuro: Motor and sensory grossly intact. Extremities:  Warm, dry, and well perfused. Limbs without apparent deformity. Skin:  No rashes or lesions. Psych: Normal mood and appropriate affect    LABS:  No results for input(s): WBC, HGB, PLT in the last 72 hours. No results for input(s): NA, K, CL, CO2, BUN, CREATININE, GLUCOSE in the last 72 hours. No results for input(s): AST, ALT, ALB, ALKPHOS, BILITOT, BILIDIR in the last 72 hours. No results for input(s): APTT, PROT, INR in the last 72 hours. IMAGING:  No results found.     ASSESSMENT   Patient Active Problem List   Diagnosis    Depression    Anxiety    Irritable bowel syndrome    Gastroparesis Fibromyalgia    Fatigue    Hypogammaglobulinemia (HCC)    Renal insufficiency    Osteopenia    History of stress fracture    Jejunostomy tube present (HCC)    H/O sepsis    Essential hypertension    Primary insomnia    Dry mouth    Family history of other condition    Feeding difficulties    CVID (common variable immunodeficiency) ([address]    Hypogammaglobulinemia ([address]    Gastrocutaneous fistula due to gastrostomy tube    Dehydration    Postprocedural non-healing wound    Dysphagia    Neuropathy    Chronic diarrhea    Iron deficiency anemia    Iron malabsorption    Supraventricular tachycardia (HCC)    Major depressive disorder with single episode, in partial remission ([address]    Sensorineural hearing loss (SNHL) of right ear with restricted hearing of left ear    Chronic renal disease, stage III [facility]) [[id number]]       71years old female presented with dislodged J-tube since this afternoon    PLAN  -Patient seen and evaluated. History, physical exam, laboratory and radiographic findings reviewed. -Bedside replacement of J-tube with a 14 [place] gastrostomy tube that patient normally used. Using Hegar dilator to dilate initially from 4 mm to final 6 mm. -Bedside Gastrografin test confirmed the J-tube is in the right place.  -Once radiology confirmed there J-tube is in place, patient is medically stable for discharge.   Dispo per ED    Electronically signed by Ja yJay Winters DO on 2/13/2023 at 2:58 AM

## 2023-02-13 NOTE — ED PROVIDER NOTES
[facility]  ED  Emergency Department Encounter  Emergency Medicine Resident     Pt Name: William Rothman  MRN: [id number]  [place] [birth year]  Date of evaluation: 2/13/23  PCP:  TRAY Khalil CNP    CHIEF COMPLAINT       Chief Complaint   Patient presents with    Real Cedric Complications     Lum Brochure out       HISTORY OFPRESENT ILLNESS  (Location/Symptom, Timing/Onset, Context/Setting, Quality, Duration, Modifying Factors,Severity.)      William Rothman is a 71 y. o.yo female who is a history of gastroparesis status post gastrostomy tube. Patient presents given 2 complications. She states that around 5 PM today her tube dislodged. She states that she did attempt to replace it which she typically does at home however that was unsuccessful. She initially presented to outlying facility, they were unable to replace it. Patient came here for further management and care. She states she is not having any vomiting, no fever, no chills, no abdominal pain. PAST MEDICAL / SURGICAL / SOCIAL / FAMILY HISTORY      has a past medical history of Abdominal pain, Anxiety, CVID (common variable immunodeficiency) ([address] Depression, Diarrhea, Difficulty swallowing, Dizziness, Fibromyalgia, Gastroparesis, Headache, History of blood transfusion, Hypogammaglobulinaemia, unspecified, Irritable bowel syndrome, Jejunostomy tube present ([address] Nausea & vomiting, Numbness, Postprocedural non-healing wound, RLS (restless legs syndrome), S/P percutaneous endoscopic gastrostomy (PEG) tube placement ([address] Sepsis ([address] Snores, SVT (supraventricular tachycardia) ([address] Wears glasses, and Wears glasses. has a past surgical history that includes Cholecystectomy (1972); Tonsillectomy and adenoidectomy (1963); Bunionectomy (Right, 1985); knee surgery (Right, 1986); Pyloroplasty (2005); Gastrostomy tube placement (2004); Gastric fundoplication (8243); Jejunostomy (2005);  Upper gastrointestinal endoscopy (0119-3287); Tunneled venous port placement (2004); Endometrial ablation (2005); Breast biopsy (Left, 8/13/15); Abscess Drainage (03/08/2018); pr office/outpt visit,procedure only (N/A, 3/8/2018); and Gastrostomy tube placement (N/A, 8/19/2019). Social History     Socioeconomic History    Marital status:      Spouse name: Not on file    Number of children: Not on file    Years of education: Not on file    Highest education level: Not on file   Occupational History    Not on file   Tobacco Use    Smoking status: Never    Smokeless tobacco: Never   Vaping Use    Vaping Use: Never used   Substance and Sexual Activity    Alcohol use: No    Drug use: No    Sexual activity: Not on file   Other Topics Concern    Not on file   Social History Narrative    Not on file     Social Determinants of Health     Financial Resource Strain: Low Risk     Difficulty of Paying Living Expenses: Not hard at all   Food Insecurity: No Food Insecurity    Worried About Running Out of Food in the Last Year: Never true    Ran Out of Food in the Last Year: Never true   Transportation Needs: Not on file   Physical Activity: Not on file   Stress: Not on file   Social Connections: Not on file   Intimate Partner Violence: Not on file   Housing Stability: Not on file       Family History   Problem Relation Age of Onset    Hypertension Mother     Heart Disease Paternal Grandfather     Stroke Father     Cancer Brother         KIDNEY AND PROSTATE    Colon Cancer Maternal Uncle     Cancer Maternal Grandmother         NON HODGINS LYMPHOMA    Cancer Maternal Grandfather         LUNG AND ESOPHAGEAL        Allergies:  Macrodantin [nitrofurantoin macrocrystal], Compazine [prochlorperazine], Phenergan [promethazine hcl], Reglan [metoclopramide], Sulfa antibiotics, Vancomycin, and Vfend [voriconazole]    Home Medications:  Prior to Admission medications    Medication Sig Start Date End Date Taking?  Authorizing Provider   DODEX 1000 MCG/ML injection INJECT 1 MILLILITER INTRAMUSCULARLY EVERY FIRST OF THE MONTH 1/17/23   Robertaadalid HernandezTRAY adames CNP   hydrOXYzine HCl (ATARAX) 25 MG tablet Take 1 tablet by mouth every 8 hours as needed for Itching 1/9/23   TRAY Mcintyre CNP   SYRINGE-NEEDLE, DISP, 3 ML (BD PLASTIPAK SYRINGE) 21G X 1\" 3 ML MISC use as directed 7/15/22   TRAY Rasheed CNP   loperamide (IMODIUM) 2 MG capsule TAKE 1 CAPSULE BY MOUTH 4  TIMES DAILY AS NEEDED FOR  DIARRHEA 6/2/22   TRAY Hall CNP   meclizine (ANTIVERT) 12.5 MG tablet  3/16/22   Historical Provider, MD   nystatin-triamcinolone [facility]) 673063-4.1 UNIT/GM-% ointment Apply topically 2 times daily  3/16/22   Historical Provider, MD   sertraline (ZOLOFT) 100 MG tablet TAKE 1 AND 1/2 TABLETS BY  MOUTH DAILY  Patient taking differently: Take 100 mg by mouth daily 12/13/21   TRAY Hall CNP   losartan (COZAAR) 50 MG tablet Take 50 mg by mouth daily 5/12/21   Historical Provider, MD   pantoprazole (PROTONIX) 40 MG tablet 2 times daily 6/2/20   Historical Provider, MD   butalbital-acetaminophen-caffeine (FIORICET, ESGIC) -40 MG per tablet Take 1 tablet by mouth every 6 hours as needed for Headaches 12/9/19   TRAY Hall CNP   diltiazem (CARDIZEM) 60 MG tablet Take 90 mg by mouth 3 times daily  9/30/15   Historical Provider, MD   Nutritional Supplements (PEPTAMEN AF) LIQD Take by mouth Indications: TUBE FEED 1500 CALORIES A DAY    Historical Provider, MD   ondansetron (ZOFRAN) 8 MG tablet   Take 8 mg by mouth every 8 hours as needed  7/9/15   Historical Provider, MD   Cholecalciferol (VITAMIN D-3) 5000 UNITS TABS Take 2,000 Units by mouth daily     Historical Provider, MD   flecainide (TAMBOCOR) 50 MG tablet Take 50 mg by mouth daily. Historical Provider, MD       REVIEW OFSYSTEMS    (2-9 systems for level 4, 10 or more for level 5)      Review of Systems   Constitutional:  Negative for fatigue and fever.    HENT:  Negative for congestion. Respiratory:  Negative for cough and shortness of breath. Cardiovascular:  Negative for chest pain. Gastrointestinal:  Negative for abdominal pain, nausea and vomiting. Skin:  Negative for rash and wound. Psychiatric/Behavioral:  Negative for behavioral problems and confusion. PHYSICAL EXAM   (up to 7 for level 4, 8 or more forlevel 5)      INITIAL VITALS:   ED Triage Vitals   BP Temp Temp Source Heart Rate Resp SpO2 Height Weight   02/13/23 0032 02/13/23 0035 02/13/23 0035 02/13/23 0032 02/13/23 0032 02/13/23 0032 -- --   (!) 174/84 97.2 °F (36.2 °C) Oral 80 16 99 %         Physical Exam  HENT:      Head: Normocephalic. Mouth/Throat:      Mouth: Mucous membranes are moist.   Eyes:      Pupils: Pupils are equal, round, and reactive to light. Cardiovascular:      Rate and Rhythm: Normal rate. Pulses: Normal pulses. Pulmonary:      Effort: Pulmonary effort is normal.   Abdominal:      General: There is no distension. Palpations: Abdomen is soft. Tenderness: There is no abdominal tenderness. Comments: Signs of PEG tube over the right quadrant, presence of stoma for G-tube tract over the left quadrant, there is no skin breakdown, no signs for cellulitis   Musculoskeletal:         General: No swelling or tenderness. Cervical back: Normal range of motion. Skin:     General: Skin is warm. Capillary Refill: Capillary refill takes less than 2 seconds. Coloration: Skin is not jaundiced. Neurological:      General: No focal deficit present. Mental Status: She is alert.    Psychiatric:         Mood and Affect: Mood normal.         Behavior: Behavior normal.       DIFFERENTIAL  DIAGNOSIS     PLAN (LABS / IMAGING / EKG):  Orders Placed This Encounter   Procedures     [facility]    Inpatient consult to General Surgery       MEDICATIONS ORDERED:  Orders Placed This Encounter   Medications    diatrizoate meglumine-sodium (GASTROGRAFIN) 66-10 % solution 30 mL         Medical Decision Making  43-year-old female with known gastroparesis dispose G-tube who is here due to G-tube dislodgment. We will initially attempt to replace the G-tube using a [address]. Patient G-tube is typically a [address] [employer]. If you are not able to replace it, we will reach out to general surgery team.  We will anticipate discharge           DIAGNOSTIC RESULTS / [address] / MDM     LABS:  Labs Reviewed - No data to display      RADIOLOGY:  XR INJ CONTRAST GASTRIC TUBE PERC    Result Date: 2/13/2023  EXAMINATION: ONE SUPINE XRAY VIEW(S) OF THE ABDOMEN 2/13/2023 2:38 am COMPARISON: Abdominal x-ray 02/18/2020. CT abdomen pelvis 05/31/2022. HISTORY: ORDERING SYSTEM PROVIDED HISTORY: J tube replacement TECHNOLOGIST PROVIDED HISTORY: J tube replacement Reason for Exam: jtube replaced dr keating inj 20cc jtube gastrografin FINDINGS: J tube tip projects over the left mid abdomen. Contrast fills small bowel loops. Bony structures are unremarkable. No dilated bowel loops within field of view. J tube tip projects over the left mid abdomen. Contrast fills small bowel loops. EKG      All EKG's are interpreted by the Emergency Department Physicianwho either signs or Co-signs this chart in the absence of a cardiologist.    EMERGENCY DEPARTMENT COURSE:  ED Course as of 02/13/23 0433   Mon Feb 13, 2023   0304 Unable to advance G-tube. We will reach out to surgery team for assistance. [AN]   0429 Xr shows J tube tip projects over the left mid abdomen. Contrast fills small bowel loops. We will plan for discharge [AN]      ED Course User Index  [AN] Debby Lee MD          PROCEDURES:  None    CONSULTS:  IP CONSULT TO GENERAL SURGERY    CRITICAL CARE:      FINAL IMPRESSION      1.  Attention to G-tube [facility])          DISPOSITION / PLAN     DISPOSITION Decision To Discharge 02/13/2023 04:29:43 AM      PATIENT REFERRED TO:  [facility] ED  [address] [facility].    If symptoms worsen    Angelicabrittney Shania, APRN - CNP  [address] [facility] [address]  [address]  [phone]      As needed    DISCHARGE MEDICATIONS:  New Prescriptions    No medications on file       Chari Amin MD  Emergency Medicine Resident    (Please note that portions of this note were completed with a voice recognition program.Efforts were made to edit the dictations but occasionally words are mis-transcribed.)        Chari Amin MD  Resident  02/13/23 2886

## 2023-02-13 NOTE — DISCHARGE INSTRUCTIONS
Follow-up with your GI as needed for your G-tube. If it anytime he does get dislodged again, please return to emergency medicine as possible. XR INJ CONTRAST GASTRIC TUBE PERC   Final Result   J tube tip projects over the left mid abdomen. Contrast fills small bowel   loops.

## 2023-02-13 NOTE — ED NOTES
Pt arrives to ED from triage as private auto transfer from [facility] [address]. Pt J tube fell out today and it is unable to be replaced. Site appears pink, no drainage noted, pt states site is sore. Pt A&Ox4, RR even and nonlabored, NAD.      Hermila Patel, RN  02/13/23 1163

## 2023-02-13 NOTE — ED NOTES
Pt presents to ED via private auto with c/o feeding tube problem. Pts feeding tube came out tonight and pt states she tried to put another one back in and was unable to. Pt brought own feeding tube to be placed back in. Pt afebrile, vitals stable. Pt able to ambulate without assist. No drainage noted from feeding tube site.       Vanesa Mathew RN  02/12/23 7241

## 2023-02-13 NOTE — ED PROVIDER NOTES
[address] [facility]      Pt Name: Desi Sarabia  MRN: [id number]  [place] 1950  Date of evaluation: 2/13/23    CHIEF COMPLAINT       Chief Complaint   Patient presents with    Feeding Tube Problem     Became dislodged today, unable to get back in. HISTORY OF PRESENT ILLNESS   Desi Sarabia is a 71 y.o. female who presents with dislodged J-tube. Has previous history of TPN and reported gastroparesis requiring G-tube as well as J-tube. Follows at [facility] and [facility]. She states that the G-tube became dislodged today. And typically she is able to replace it herself but is unable to. PASTMEDICAL HISTORY     Past Medical History:   Diagnosis Date    Abdominal pain     Anxiety     CVID (common variable immunodeficiency) ([facility])     Depression     Diarrhea     Difficulty swallowing     Dizziness     Fibromyalgia     Gastroparesis     Headache     History of blood transfusion     Hypogammaglobulinaemia, unspecified 2013    Irritable bowel syndrome     Jejunostomy tube present ([facility])     USES TO FEED SELF , PEPTAMEN AF 1500 BESSY A DAY    Nausea & vomiting     Numbness     Postprocedural non-healing wound 6/26/2019    RLS (restless legs syndrome)     S/P percutaneous endoscopic gastrostomy (PEG) tube placement ([address]     USES TO DRAIN STOMACH    Sepsis ([address] 06/2006    Snores     SVT (supraventricular tachycardia) ([address] 2006    ON RX    Wears glasses     Wears glasses      Past Problem List  Patient Active Problem List   Diagnosis Code    Depression F32. A    Anxiety F41.9    Irritable bowel syndrome K58.9    Gastroparesis K31.84    Fibromyalgia M79.7    Fatigue R53.83    Hypogammaglobulinemia ([facility]) D80.1    Renal insufficiency N28.9    Osteopenia M85.80    History of stress fracture Z87.312    Jejunostomy tube present ([facility]) Z93.4    H/O sepsis Z86.19    Essential hypertension I10    Primary insomnia F51.01    Dry mouth R68.2    Family history of other condition Z84.89 Feeding difficulties R63.30    CVID (common variable immunodeficiency) (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 A21    Chronic renal disease, stage III [facility]) [[id number]] N18.30       SURGICAL HISTORY       Past Surgical History:   Procedure Laterality Date    ABSCESS DRAINAGE  03/08/2018     near peg tube- local    BREAST BIOPSY Left 8/13/15    BUNIONECTOMY Right [facility]    ENDOMETRIAL ABLATION  2005    GASTRIC FUNDOPLICATION  8773    sx that injured the vagus nerves and caused gastroparesis    GASTROSTOMY TUBE PLACEMENT  2004    for stomach drainage    GASTROSTOMY TUBE PLACEMENT N/A 8/19/2019    EGD WITH GASTROSTOMY TUBE PLACEMENT - GI SCHEDULED performed by Allyson Browning MD at [facility]  2005    J-tube for feeding    KNEE SURGERY Right 1986    tumor    [place] OFFICE/OUTPT VISIT,PROCEDURE ONLY N/A 3/8/2018    INCISION AND DRAINAGE ABSCESS NEAR PEG TUBE performed by Robin Constantino DO at [address] [place] [address]  2005    done to help gastroparesis fr vagus nerve injury    [address]    TUNNELED VENOUS PORT PLACEMENT  2004    REMOVED 2 YRS LATER    UPPER GASTROINTESTINAL ENDOSCOPY  1993-2012    X 15 SOME WITH BX., SOME FOR DILATATION       CURRENT MEDICATIONS       Discharge Medication List as of 2/12/2023 11:38 PM        CONTINUE these medications which have NOT CHANGED    Details   DODEX 1000 MCG/ML injection INJECT 1 MILLILITER INTRAMUSCULARLY EVERY FIRST OF THE MONTH, Disp-10 mL, R-5Normal      hydrOXYzine HCl (ATARAX) 25 MG tablet Take 1 tablet by mouth every 8 hours as needed for Itching, Disp-90 tablet, R-3Normal      SYRINGE-NEEDLE, DISP, 3 ML (BD PLASTIPAK SYRINGE) 21G X 1\" 3 ML MISC use as directed, Disp-100 each, R-3Normal      loperamide (IMODIUM) 2 MG capsule TAKE 1 CAPSULE BY MOUTH 4  TIMES DAILY AS NEEDED FOR  DIARRHEA, Disp-360 capsule, R-3Normal      meclizine (ANTIVERT) 12.5 MG tablet Historical Med      nystatin-triamcinolone (MYCOLOG) 464772-2.1 UNIT/GM-% ointment Apply topically 2 times daily , Topical, 2 TIMES DAILY Starting Wed 3/16/2022, Historical Med      sertraline (ZOLOFT) 100 MG tablet TAKE 1 AND 1/2 TABLETS BY  MOUTH DAILY, Disp-135 tablet, R-3Requesting 1 year supplyNormal      losartan (COZAAR) 50 MG tablet Take 50 mg by mouth dailyHistorical Med      pantoprazole (PROTONIX) 40 MG tablet 2 times dailyHistorical Med      butalbital-acetaminophen-caffeine (FIORICET, ESGIC) -40 MG per tablet Take 1 tablet by mouth every 6 hours as needed for Headaches, Disp-90 tablet, R-1Normal      diltiazem (CARDIZEM) 60 MG tablet Take 90 mg by mouth 3 times daily Historical Med      Nutritional Supplements (PEPTAMEN AF) LIQD Take by mouth Indications: TUBE FEED 1500 CALORIES A DAY      ondansetron (ZOFRAN) 8 MG tablet   Take 8 mg by mouth every 8 hours as needed       Cholecalciferol (VITAMIN D-3) 5000 UNITS TABS Take 2,000 Units by mouth daily Historical Med      flecainide (TAMBOCOR) 50 MG tablet Take 50 mg by mouth daily.             ALLERGIES     is allergic to macrodantin [nitrofurantoin macrocrystal], compazine [prochlorperazine], phenergan [promethazine hcl], reglan [metoclopramide], sulfa antibiotics, vancomycin, and vfend [voriconazole].    FAMILY HISTORY     She indicated that her mother is alive. She indicated that her father is . She indicated that both of her brothers are alive. She indicated that her maternal grandmother is .  She indicated that her maternal grandfather is . She indicated that her paternal grandmother is . She indicated that her paternal grandfather is . She indicated that her maternal uncle is alive. SOCIAL HISTORY       Social History     Tobacco Use    Smoking status: Never    Smokeless tobacco: Never   Vaping Use    Vaping Use: Never used   Substance Use Topics    Alcohol use: No    Drug use: No       PHYSICAL EXAM     INITIAL VITALS: BP (!) 139/110   Pulse 75   Temp 97.6 °F (36.4 °C)   Resp 12   Ht 5' 10\" (1.778 m)   Wt 175 lb (79.4 kg)   LMP  (LMP Unknown)   SpO2 97%   BMI 25.11 kg/m²    Physical Exam  Vitals and nursing note reviewed. Constitutional:       General: She is not in acute distress. Appearance: She is well-developed. She is not toxic-appearing. HENT:      Head: Normocephalic and atraumatic. Nose: Nose normal.      Mouth/Throat:      Mouth: Mucous membranes are moist.   Eyes:      General: No scleral icterus. Conjunctiva/sclera: Conjunctivae normal.      Pupils: Pupils are equal, round, and reactive to light. Cardiovascular:      Rate and Rhythm: Normal rate and regular rhythm. Pulmonary:      Effort: Pulmonary effort is normal. No respiratory distress. Abdominal:      Comments: G-tube in place, well formed track and some scar tissue around J-tube site. Musculoskeletal:         General: Normal range of motion. Skin:     General: Skin is warm and dry. Findings: No erythema or rash. Neurological:      Mental Status: She is alert and oriented to person, place, and time. Psychiatric:         Behavior: Behavior normal.       MEDICAL DECISION MAKING / ED COURSE:   Summary of Patient Presentation:      1)  Number and Complexity of Problems  Problem List This Visit:    1.  Dislodged jejunostomy tube        Differential Diagnosis: J-tube dislodgment    Diagnoses Considered but Do Not Suspect: Bowel perforation    Pertinent Comorbid Conditions:    Past Medical History:   Diagnosis Date    Abdominal pain     Anxiety     CVID (common variable immunodeficiency) (HCC)     Depression     Diarrhea     Difficulty swallowing     Dizziness     Fibromyalgia     Gastroparesis     Headache     History of blood transfusion     Hypogammaglobulinaemia, unspecified 2013    Irritable bowel syndrome     Jejunostomy tube present (HCC)     USES TO FEED SELF , PEPTAMEN AF 1500 BESSY A DAY    Nausea & vomiting     Numbness     Postprocedural non-healing wound 6/26/2019    RLS (restless legs syndrome)     S/P percutaneous endoscopic gastrostomy (PEG) tube placement ([address]     USES TO DRAIN STOMACH    Sepsis ([address] 06/2006    Snores     SVT (supraventricular tachycardia) ([address] 2006    ON RX    Wears glasses     Wears glasses        2)  Data Reviewed    External Documents Reviewed: Previous ER visits reviewed by myself  3)  Treatment and Disposition  [Patient repeat assessment, Disposition discussion with patient/family, Case discussed with consulting clinician, MIPS, Social determinants of health impacting treatment or disposition, Shared Decision Making, Code Status Discussion:]    I did attempt a bedside to replace the J-tube however despite significant pressure and manipulation of tube multiple different angles unable to palpate or feel any track into the jejunum. At this point recommend inpatient go to [facility] for surgery to attempt a bedside may have more success with the dilatation or other procedure. May require OR if unable. Discussed with Dr. Marquis Zaragoza ER agreeable to ER ER transfer for surgical evaluation. Patient agreeable to private auto transfer. DATA FOR LAB AND RADIOLOGY TESTS ORDERED BELOW ARE REVIEWED BY THE ED CLINICIAN:    RADIOLOGY: All x-rays, CT, MRI, and formal ultrasound images (except ED bedside ultrasound) are read by the radiologist, see reports below, unless otherwise noted in MDM or here. Reports below are reviewed by myself.   No orders to display       LABS: Lab orders shown below, the results are reviewed by myself, and all abnormals are listed below. Labs Reviewed - No data to display    Vitals Reviewed:    Vitals:    02/12/23 2130   BP: (!) 139/110   Pulse: 75   Resp: 12   Temp: 97.6 °F (36.4 °C)   SpO2: 97%   Weight: 175 lb (79.4 kg)   Height: 5' 10\" (1.778 m)     MEDICATIONS GIVEN TO PATIENT THIS ENCOUNTER:  Orders Placed This Encounter   Medications    lidocaine (XYLOCAINE) 2 % uro-jet 5 mL     DISCHARGE PRESCRIPTIONS:  Discharge Medication List as of 2/12/2023 11:38 PM        PHYSICIAN CONSULTS ORDERED THIS ENCOUNTER:  None    ED Course Notes From Epic Narrator:  ED Course as of 02/13/23 0431   Sun Feb 12, 2023 2317 Patient has G-tube and J-tube. G-tube is in place but unable to replace G-tube. Follows with [facility] in [place]. We will attempt ER to ER transfer for surgery to attempt to replace at bedside [MS]      ED Course User Index  [MS] Lisbet Lynne DO         CRITICAL CARE:   0    PROCEDURES:  none    FINAL IMPRESSION      1. Dislodged jejunostomy tube          DISPOSITION/PLAN   DISPOSITION Decision To Discharge 02/12/2023 11:30:44 PM      OUTPATIENT FOLLOW UP THE PATIENT:  No follow-up provider specified.     DISCHARGE MEDICATIONS:  Discharge Medication List as of 2/12/2023 11:38 PM          Lisbet Lynne DO  EmergencyMedicine Attending    (Please note that portions of this note were completed with a voice recognition program.  Efforts were made to edit the dictations but occasionally words are mis-transcribed.)       Lisbet Lynne DO  02/13/23 1505

## 2023-02-13 NOTE — ED NOTES
70 yo F  Feeding dislodged, unable to place,   G tube \ J tube but J tube dislodged,   Follows with Dr Tara Dejesus,   Vss /// private auto,        Jacky Torres RN  02/13/23 9343

## 2023-03-23 NOTE — TELEPHONE ENCOUNTER
Please sign the attached script Problem: Discharge Planning  Goal: Discharge to home or other facility with appropriate resources  Outcome: Progressing     Problem: Pain  Goal: Verbalizes/displays adequate comfort level or baseline comfort level  3/23/2023 0749 by Ledy Rios RN  Outcome: Progressing  3/23/2023 0002 by Nigel Ramirez RN  Outcome: Progressing  Flowsheets (Taken 3/23/2023 0002)  Verbalizes/displays adequate comfort level or baseline comfort level:   Encourage patient to monitor pain and request assistance   Assess pain using appropriate pain scale  Note: Patient has remained free of pain this shift and is resting comfortably.

## 2023-05-01 ENCOUNTER — PATIENT MESSAGE (OUTPATIENT)
Dept: PRIMARY CARE CLINIC | Age: 73
End: 2023-05-01

## 2023-05-01 DIAGNOSIS — N63.20 MASS OF LEFT BREAST, UNSPECIFIED QUADRANT: ICD-10-CM

## 2023-05-01 DIAGNOSIS — F51.01 PRIMARY INSOMNIA: ICD-10-CM

## 2023-05-01 DIAGNOSIS — N64.4 PAIN OF LEFT BREAST: Primary | ICD-10-CM

## 2023-05-01 DIAGNOSIS — F41.9 ANXIETY: Chronic | ICD-10-CM

## 2023-05-02 RX ORDER — LORAZEPAM 1 MG/1
TABLET ORAL
Qty: 90 TABLET | Refills: 0 | Status: SHIPPED | OUTPATIENT
Start: 2023-05-02 | End: 2023-06-02

## 2023-05-02 NOTE — TELEPHONE ENCOUNTER
From: Lizeth Wu  To: Juancho Ty  Sent: 5/1/2023 10:20 PM EDT  Subject: Mammogram ?    I had a mammogram in the fall and it was negative. I have been having pain in my left breast in the area of the previous breast biopsy done a few years ago. I feel a small lump but could be scar tissue. When I get the pain I have to hold my breast to stop it from hurting. Would it be possible to just get another mammogram of the left breast.    Shukri Vázquez.

## 2023-05-05 ENCOUNTER — OFFICE VISIT (OUTPATIENT)
Dept: BEHAVIORAL/MENTAL HEALTH CLINIC | Age: 73
End: 2023-05-05

## 2023-05-05 DIAGNOSIS — F43.9 TRAUMA AND STRESSOR-RELATED DISORDER: Primary | ICD-10-CM
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
## 2023-05-24 ENCOUNTER — HOSPITAL ENCOUNTER (OUTPATIENT)
Dept: MAMMOGRAPHY | Age: 73
Discharge: HOME | End: 2023-05-26
Payer: MEDICARE

## 2023-05-24 ENCOUNTER — HOSPITAL ENCOUNTER (OUTPATIENT)
Dept: ULTRASOUND IMAGING | Age: 73
Discharge: HOME | End: 2023-05-26
Payer: MEDICARE

## 2023-05-24 DIAGNOSIS — R92.8 ABNORMAL MAMMOGRAM: ICD-10-CM

## 2023-05-24 DIAGNOSIS — N64.4 PAIN OF LEFT BREAST: ICD-10-CM

## 2023-05-24 DIAGNOSIS — N63.20 MASS OF LEFT BREAST, UNSPECIFIED QUADRANT: ICD-10-CM

## 2023-05-24 DIAGNOSIS — R92.8 OTHER ABNORMAL AND INCONCLUSIVE FINDINGS ON DIAGNOSTIC IMAGING OF BREAST: ICD-10-CM

## 2023-05-24 DIAGNOSIS — N63.20 MASS OF LEFT BREAST, UNSPECIFIED QUADRANT: Primary | ICD-10-CM

## 2023-05-24 PROCEDURE — 77065 DX MAMMO INCL CAD UNI: CPT

## 2023-05-24 PROCEDURE — 76642 ULTRASOUND BREAST LIMITED: CPT

## 2023-06-01 ENCOUNTER — HOSPITAL ENCOUNTER (OUTPATIENT)
Age: 73
Setting detail: SPECIMEN
Discharge: HOME | End: 2023-06-01
Payer: MEDICARE

## 2023-06-01 ENCOUNTER — HOSPITAL ENCOUNTER (OUTPATIENT)
Dept: MAMMOGRAPHY | Age: 73
Discharge: HOME | End: 2023-06-03
Payer: MEDICARE

## 2023-06-01 ENCOUNTER — HOSPITAL ENCOUNTER (OUTPATIENT)
Dept: ULTRASOUND IMAGING | Age: 73
Discharge: HOME | End: 2023-06-03
Payer: MEDICARE

## 2023-06-01 DIAGNOSIS — K90.9 IRON MALABSORPTION: ICD-10-CM

## 2023-06-01 DIAGNOSIS — N63.20 MASS OF LEFT BREAST, UNSPECIFIED QUADRANT: ICD-10-CM

## 2023-06-01 DIAGNOSIS — Z98.890 STATUS POST BREAST BIOPSY: ICD-10-CM

## 2023-06-01 DIAGNOSIS — R92.8 OTHER ABNORMAL AND INCONCLUSIVE FINDINGS ON DIAGNOSTIC IMAGING OF BREAST: ICD-10-CM

## 2023-06-01 LAB
BASOPHILS # BLD: 0.04 K/UL (ref 0–0.2)
BASOPHILS NFR BLD: 1 % (ref 0–2)
EOSINOPHIL # BLD: 0.13 K/UL (ref 0–0.44)
EOSINOPHILS RELATIVE PERCENT: 2 % (ref 1–4)
ERYTHROCYTE [DISTWIDTH] IN BLOOD BY AUTOMATED COUNT: 14 % (ref 11.8–14.4)
FERRITIN SERPL-MCNC: 32 NG/ML (ref 13–150)
HCT VFR BLD AUTO: 37.2 % (ref 36.3–47.1)
HGB BLD-MCNC: 12.2 G/DL (ref 11.9–15.1)
IMM GRANULOCYTES # BLD AUTO: 0.03 K/UL (ref 0–0.3)
IMM GRANULOCYTES NFR BLD: 1 %
LYMPHOCYTES # BLD: 27 % (ref 24–43)
LYMPHOCYTES NFR BLD: 1.73 K/UL (ref 1.1–3.7)
MCH RBC QN AUTO: 31.2 PG (ref 25.2–33.5)
MCHC RBC AUTO-ENTMCNC: 32.8 G/DL (ref 28.4–34.8)
MCV RBC AUTO: 95.1 FL (ref 82.6–102.9)
MONOCYTES NFR BLD: 0.54 K/UL (ref 0.1–1.2)
MONOCYTES NFR BLD: 9 % (ref 3–12)
NEUTROPHILS NFR BLD: 60 % (ref 36–65)
NEUTS SEG NFR BLD: 3.85 K/UL (ref 1.5–8.1)
NRBC AUTOMATED: 0 PER 100 WBC
PLATELET # BLD AUTO: 287 K/UL (ref 138–453)
PMV BLD AUTO: 10.9 FL (ref 8.1–13.5)
RBC # BLD AUTO: 3.91 M/UL (ref 3.95–5.11)
WBC OTHER # BLD: 6.3 K/UL (ref 3.5–11.3)

## 2023-06-01 PROCEDURE — 2500000003 HC RX 250 WO HCPCS

## 2023-06-01 PROCEDURE — 19083 BX BREAST 1ST LESION US IMAG: CPT

## 2023-06-01 PROCEDURE — 85027 COMPLETE CBC AUTOMATED: CPT

## 2023-06-01 PROCEDURE — 82728 ASSAY OF FERRITIN: CPT

## 2023-06-01 PROCEDURE — 36415 COLL VENOUS BLD VENIPUNCTURE: CPT

## 2023-06-01 PROCEDURE — A4648 IMPLANTABLE TISSUE MARKER: HCPCS

## 2023-06-06 LAB — SURGICAL PATHOLOGY REPORT: NORMAL

## 2023-07-08 SDOH — ECONOMIC STABILITY: HOUSING INSECURITY
IN THE LAST 12 MONTHS, WAS THERE A TIME WHEN YOU DID NOT HAVE A STEADY PLACE TO SLEEP OR SLEPT IN A SHELTER (INCLUDING NOW)?: NO

## 2023-07-08 SDOH — ECONOMIC STABILITY: INCOME INSECURITY: HOW HARD IS IT FOR YOU TO PAY FOR THE VERY BASICS LIKE FOOD, HOUSING, MEDICAL CARE, AND HEATING?: NOT HARD AT ALL

## 2023-07-08 SDOH — HEALTH STABILITY: PHYSICAL HEALTH: ON AVERAGE, HOW MANY DAYS PER WEEK DO YOU ENGAGE IN MODERATE TO STRENUOUS EXERCISE (LIKE A BRISK WALK)?: 7 DAYS

## 2023-07-08 SDOH — ECONOMIC STABILITY: FOOD INSECURITY: WITHIN THE PAST 12 MONTHS, YOU WORRIED THAT YOUR FOOD WOULD RUN OUT BEFORE YOU GOT MONEY TO BUY MORE.: NEVER TRUE

## 2023-07-08 SDOH — HEALTH STABILITY: PHYSICAL HEALTH: ON AVERAGE, HOW MANY MINUTES DO YOU ENGAGE IN EXERCISE AT THIS LEVEL?: 30 MIN

## 2023-07-08 SDOH — ECONOMIC STABILITY: FOOD INSECURITY: WITHIN THE PAST 12 MONTHS, THE FOOD YOU BOUGHT JUST DIDN'T LAST AND YOU DIDN'T HAVE MONEY TO GET MORE.: NEVER TRUE

## 2023-07-08 SDOH — ECONOMIC STABILITY: TRANSPORTATION INSECURITY
IN THE PAST 12 MONTHS, HAS LACK OF TRANSPORTATION KEPT YOU FROM MEETINGS, WORK, OR FROM GETTING THINGS NEEDED FOR DAILY LIVING?: NO

## 2023-07-08 ASSESSMENT — LIFESTYLE VARIABLES
HOW MANY STANDARD DRINKS CONTAINING ALCOHOL DO YOU HAVE ON A TYPICAL DAY: 0
HOW MANY STANDARD DRINKS CONTAINING ALCOHOL DO YOU HAVE ON A TYPICAL DAY: PATIENT DOES NOT DRINK
HOW OFTEN DO YOU HAVE A DRINK CONTAINING ALCOHOL: 1
HOW OFTEN DO YOU HAVE SIX OR MORE DRINKS ON ONE OCCASION: 1
HOW OFTEN DO YOU HAVE A DRINK CONTAINING ALCOHOL: NEVER

## 2023-07-08 ASSESSMENT — PATIENT HEALTH QUESTIONNAIRE - PHQ9
8. MOVING OR SPEAKING SO SLOWLY THAT OTHER PEOPLE COULD HAVE NOTICED. OR THE OPPOSITE, BEING SO FIGETY OR RESTLESS THAT YOU HAVE BEEN MOVING AROUND A LOT MORE THAN USUAL: 1
1. LITTLE INTEREST OR PLEASURE IN DOING THINGS: 1
6. FEELING BAD ABOUT YOURSELF - OR THAT YOU ARE A FAILURE OR HAVE LET YOURSELF OR YOUR FAMILY DOWN: 0
9. THOUGHTS THAT YOU WOULD BE BETTER OFF DEAD, OR OF HURTING YOURSELF: 0
2. FEELING DOWN, DEPRESSED OR HOPELESS: 1
10. IF YOU CHECKED OFF ANY PROBLEMS, HOW DIFFICULT HAVE THESE PROBLEMS MADE IT FOR YOU TO DO YOUR WORK, TAKE CARE OF THINGS AT HOME, OR GET ALONG WITH OTHER PEOPLE: 1
SUM OF ALL RESPONSES TO PHQ QUESTIONS 1-9: 5
7. TROUBLE CONCENTRATING ON THINGS, SUCH AS READING THE NEWSPAPER OR WATCHING TELEVISION: 0
4. FEELING TIRED OR HAVING LITTLE ENERGY: 1
SUM OF ALL RESPONSES TO PHQ QUESTIONS 1-9: 5
5. POOR APPETITE OR OVEREATING: 0
SUM OF ALL RESPONSES TO PHQ9 QUESTIONS 1 & 2: 2
3. TROUBLE FALLING OR STAYING ASLEEP: 1

## 2023-07-10 ENCOUNTER — OFFICE VISIT (OUTPATIENT)
Dept: PRIMARY CARE CLINIC | Age: 73
End: 2023-07-10
Payer: MEDICARE

## 2023-07-10 VITALS
DIASTOLIC BLOOD PRESSURE: 64 MMHG | RESPIRATION RATE: 16 BRPM | HEIGHT: 70 IN | OXYGEN SATURATION: 98 % | BODY MASS INDEX: 26.08 KG/M2 | SYSTOLIC BLOOD PRESSURE: 120 MMHG | HEART RATE: 88 BPM | WEIGHT: 182.2 LBS

## 2023-07-10 DIAGNOSIS — I10 ESSENTIAL HYPERTENSION: Chronic | ICD-10-CM

## 2023-07-10 DIAGNOSIS — R06.02 SOBOE (SHORTNESS OF BREATH ON EXERTION): ICD-10-CM

## 2023-07-10 DIAGNOSIS — F41.9 ANXIETY: ICD-10-CM

## 2023-07-10 DIAGNOSIS — Z00.00 MEDICARE ANNUAL WELLNESS VISIT, SUBSEQUENT: Primary | ICD-10-CM

## 2023-07-10 DIAGNOSIS — I47.1 SUPRAVENTRICULAR TACHYCARDIA (HCC): ICD-10-CM

## 2023-07-10 PROBLEM — R63.30 FEEDING DIFFICULTIES: Status: RESOLVED | Noted: 2018-11-13 | Resolved: 2023-07-10

## 2023-07-10 PROBLEM — N63.20 MASS OF LEFT BREAST: Status: ACTIVE | Noted: 2023-07-10

## 2023-07-10 PROBLEM — R92.8 ABNORMAL FINDINGS ON DIAGNOSTIC IMAGING OF BREAST: Status: ACTIVE | Noted: 2023-07-10

## 2023-07-10 PROCEDURE — 3074F SYST BP LT 130 MM HG: CPT | Performed by: NURSE PRACTITIONER

## 2023-07-10 PROCEDURE — 3017F COLORECTAL CA SCREEN DOC REV: CPT | Performed by: NURSE PRACTITIONER

## 2023-07-10 PROCEDURE — 1123F ACP DISCUSS/DSCN MKR DOCD: CPT | Performed by: NURSE PRACTITIONER

## 2023-07-10 PROCEDURE — 3078F DIAST BP <80 MM HG: CPT | Performed by: NURSE PRACTITIONER

## 2023-07-10 PROCEDURE — G0439 PPPS, SUBSEQ VISIT: HCPCS | Performed by: NURSE PRACTITIONER

## 2023-07-10 NOTE — PROGRESS NOTES
Medicare Annual Wellness Visit    Segun Leal is here for Medicare AWV    Assessment & Plan   Medicare annual wellness visit, subsequent  SOBOE (shortness of breath on exertion)-discussed her symptoms likely related to weather changes. However, will check echo, has not been done for a few years. She does see cardiology but not for several months. Given her history of hypertension and SVT, need to rule out any ventricular changes contributing to her shortness of breath. Advised daily antihistamine for now, avoid walking in humid weather outdoors  -     ECHO Complete 2D W Doppler W Color; Future  Essential hypertension-well-controlled on diltiazem  Supraventricular tachycardia ([address])  Anxiety-generally stable with relaxation techniques, she speaks with therapist intermittently, she has continued her sertraline and uses Ativan as needed. PDMP reflects appropriate use of benzo    Recommendations for Preventive Services Due: see orders and patient instructions/AVS.  Recommended screening schedule for the next 5-10 years is provided to the patient in written form: see Patient Instructions/AVS.     Return in about 6 months (around 1/10/2024) for SVT, anxiety. Subjective   The following acute and/or chronic problems were also addressed today:  She reports has been walking for exercise for the pasts 3 months for about 30 minutes  Recently obtained apple watch and has noticed she is not in the \"cardio range\". She states has noticed SOB with exertion and also will wake frequently at night. She is wondering about sleep apnea. However, states will not consider CPAP  She does not feel her SOB is related to her anxiety, did ask cardio at her April visit and he was not concerned at that time  Has not had a recent echo    Reports discussed recent mamm with her hematologist, he was advising MRI in future.   Recent biopsy was negative    Patient's complete Health Risk Assessment and screening values have been reviewed and

## 2023-07-10 NOTE — PATIENT INSTRUCTIONS
\"Exercise & Physical Activity: Your Everyday Guide\" from [employer]. Call 9-641.324.4210 or search [employer] online. You need 7188-9654 mg of calcium and 6660-2392 IU of vitamin D per day. It is possible to meet your calcium requirement with diet alone, but a vitamin D supplement is usually necessary to meet this goal.  When exposed to the sun, use a sunscreen that protects against both UVA and UVB radiation with an SPF of 30 or greater. Reapply every 2 to 3 hours or after sweating, drying off with a towel, or swimming. Always wear a seat belt when traveling in a car. Always wear a helmet when riding a bicycle or motorcycle.

## 2023-07-25 DIAGNOSIS — R06.02 SOBOE (SHORTNESS OF BREATH ON EXERTION): ICD-10-CM

## 2023-08-01 RX ORDER — SERTRALINE HYDROCHLORIDE 100 MG/1
TABLET, FILM COATED ORAL
Qty: 135 TABLET | Refills: 3 | Status: SHIPPED | OUTPATIENT
Start: 2023-08-01

## 2023-08-28 DIAGNOSIS — F41.9 ANXIETY: Chronic | ICD-10-CM

## 2023-08-28 DIAGNOSIS — F51.01 PRIMARY INSOMNIA: ICD-10-CM

## 2023-08-29 RX ORDER — LORAZEPAM 1 MG/1
TABLET ORAL
Qty: 90 TABLET | Refills: 0 | Status: SHIPPED | OUTPATIENT
Start: 2023-08-29 | End: 2023-09-29

## 2023-08-31 ENCOUNTER — HOSPITAL ENCOUNTER (OUTPATIENT)
Dept: CT IMAGING | Age: 73
Discharge: HOME | End: 2023-09-02
Payer: MEDICARE

## 2023-08-31 DIAGNOSIS — Z87.09 HISTORY OF SINUSITIS: ICD-10-CM

## 2023-08-31 PROCEDURE — 70486 CT MAXILLOFACIAL W/O DYE: CPT

## 2023-09-25 ENCOUNTER — OFFICE VISIT (OUTPATIENT)
Dept: PRIMARY CARE CLINIC | Age: 73
End: 2023-09-25
Payer: MEDICARE

## 2023-09-25 VITALS
HEART RATE: 65 BPM | SYSTOLIC BLOOD PRESSURE: 132 MMHG | OXYGEN SATURATION: 99 % | WEIGHT: 181.2 LBS | HEIGHT: 70 IN | BODY MASS INDEX: 25.94 KG/M2 | DIASTOLIC BLOOD PRESSURE: 70 MMHG

## 2023-09-25 DIAGNOSIS — R20.0 LEFT ARM NUMBNESS: ICD-10-CM

## 2023-09-25 DIAGNOSIS — M25.512 PAIN IN SHOULDER REGION, LEFT: Primary | ICD-10-CM

## 2023-09-25 PROCEDURE — G8427 DOCREV CUR MEDS BY ELIG CLIN: HCPCS | Performed by: NURSE PRACTITIONER

## 2023-09-25 PROCEDURE — G8399 PT W/DXA RESULTS DOCUMENT: HCPCS | Performed by: NURSE PRACTITIONER

## 2023-09-25 PROCEDURE — 3078F DIAST BP <80 MM HG: CPT | Performed by: NURSE PRACTITIONER

## 2023-09-25 PROCEDURE — 1123F ACP DISCUSS/DSCN MKR DOCD: CPT | Performed by: NURSE PRACTITIONER

## 2023-09-25 PROCEDURE — 99214 OFFICE O/P EST MOD 30 MIN: CPT | Performed by: NURSE PRACTITIONER

## 2023-09-25 PROCEDURE — 1090F PRES/ABSN URINE INCON ASSESS: CPT | Performed by: NURSE PRACTITIONER

## 2023-09-25 PROCEDURE — 3075F SYST BP GE 130 - 139MM HG: CPT | Performed by: NURSE PRACTITIONER

## 2023-09-25 PROCEDURE — 3017F COLORECTAL CA SCREEN DOC REV: CPT | Performed by: NURSE PRACTITIONER

## 2023-09-25 PROCEDURE — 1036F TOBACCO NON-USER: CPT | Performed by: NURSE PRACTITIONER

## 2023-09-25 PROCEDURE — 96372 THER/PROPH/DIAG INJ SC/IM: CPT | Performed by: NURSE PRACTITIONER

## 2023-09-25 PROCEDURE — G8417 CALC BMI ABV UP PARAM F/U: HCPCS | Performed by: NURSE PRACTITIONER

## 2023-09-25 RX ORDER — KETOROLAC TROMETHAMINE 30 MG/ML
60 INJECTION, SOLUTION INTRAMUSCULAR; INTRAVENOUS ONCE
Status: COMPLETED | OUTPATIENT
Start: 2023-09-25 | End: 2023-09-25

## 2023-09-25 RX ORDER — METHYLPREDNISOLONE ACETATE 80 MG/ML
80 INJECTION, SUSPENSION INTRA-ARTICULAR; INTRALESIONAL; INTRAMUSCULAR; SOFT TISSUE ONCE
Status: COMPLETED | OUTPATIENT
Start: 2023-09-25 | End: 2023-09-25

## 2023-09-25 RX ORDER — PREDNISONE 20 MG/1
20 TABLET ORAL 2 TIMES DAILY
Qty: 10 TABLET | Refills: 0 | Status: SHIPPED | OUTPATIENT
Start: 2023-09-25 | End: 2023-09-30

## 2023-09-25 RX ADMIN — KETOROLAC TROMETHAMINE 60 MG: 30 INJECTION, SOLUTION INTRAMUSCULAR; INTRAVENOUS at 09:34

## 2023-09-25 RX ADMIN — METHYLPREDNISOLONE ACETATE 80 MG: 80 INJECTION, SUSPENSION INTRA-ARTICULAR; INTRALESIONAL; INTRAMUSCULAR; SOFT TISSUE at 09:35

## 2023-09-25 SDOH — ECONOMIC STABILITY: FOOD INSECURITY: WITHIN THE PAST 12 MONTHS, THE FOOD YOU BOUGHT JUST DIDN'T LAST AND YOU DIDN'T HAVE MONEY TO GET MORE.: NEVER TRUE

## 2023-09-25 SDOH — ECONOMIC STABILITY: INCOME INSECURITY: HOW HARD IS IT FOR YOU TO PAY FOR THE VERY BASICS LIKE FOOD, HOUSING, MEDICAL CARE, AND HEATING?: NOT HARD AT ALL

## 2023-09-25 SDOH — ECONOMIC STABILITY: FOOD INSECURITY: WITHIN THE PAST 12 MONTHS, YOU WORRIED THAT YOUR FOOD WOULD RUN OUT BEFORE YOU GOT MONEY TO BUY MORE.: NEVER TRUE

## 2023-09-25 ASSESSMENT — ENCOUNTER SYMPTOMS
NAUSEA: 0
CONSTIPATION: 0
ABDOMINAL PAIN: 0
COUGH: 0
SORE THROAT: 0
SINUS PRESSURE: 0
BLOOD IN STOOL: 0
SHORTNESS OF BREATH: 0
WHEEZING: 0
VOMITING: 0
TROUBLE SWALLOWING: 0
DIARRHEA: 0

## 2023-09-25 ASSESSMENT — PATIENT HEALTH QUESTIONNAIRE - PHQ9
4. FEELING TIRED OR HAVING LITTLE ENERGY: 0
SUM OF ALL RESPONSES TO PHQ QUESTIONS 1-9: 0
SUM OF ALL RESPONSES TO PHQ QUESTIONS 1-9: 0
7. TROUBLE CONCENTRATING ON THINGS, SUCH AS READING THE NEWSPAPER OR WATCHING TELEVISION: 0
2. FEELING DOWN, DEPRESSED OR HOPELESS: 0
9. THOUGHTS THAT YOU WOULD BE BETTER OFF DEAD, OR OF HURTING YOURSELF: 0
5. POOR APPETITE OR OVEREATING: 0
SUM OF ALL RESPONSES TO PHQ QUESTIONS 1-9: 0
SUM OF ALL RESPONSES TO PHQ9 QUESTIONS 1 & 2: 0
10. IF YOU CHECKED OFF ANY PROBLEMS, HOW DIFFICULT HAVE THESE PROBLEMS MADE IT FOR YOU TO DO YOUR WORK, TAKE CARE OF THINGS AT HOME, OR GET ALONG WITH OTHER PEOPLE: 0
1. LITTLE INTEREST OR PLEASURE IN DOING THINGS: 0
SUM OF ALL RESPONSES TO PHQ QUESTIONS 1-9: 0
8. MOVING OR SPEAKING SO SLOWLY THAT OTHER PEOPLE COULD HAVE NOTICED. OR THE OPPOSITE, BEING SO FIGETY OR RESTLESS THAT YOU HAVE BEEN MOVING AROUND A LOT MORE THAN USUAL: 0
3. TROUBLE FALLING OR STAYING ASLEEP: 0
6. FEELING BAD ABOUT YOURSELF - OR THAT YOU ARE A FAILURE OR HAVE LET YOURSELF OR YOUR FAMILY DOWN: 0

## 2023-09-25 NOTE — PROGRESS NOTES
mindful of nsaid use to hx of gastritis    Orders Placed This Encounter   Procedures    Angelina Dawson MD, Orthopaedic Surgery, [place]     Referral Priority:   Routine     Referral Type:   Eval and Treat     Referral Reason:   Specialty Services Required     Referred to Provider:   Eusebio Samson MD     Requested Specialty:   Orthopedic Surgery     Number of Visits Requested:   1        Orders Placed This Encounter   Medications    methylPREDNISolone acetate (DEPO-MEDROL) injection 80 mg    ketorolac (TORADOL) injection 60 mg    predniSONE (DELTASONE) 20 MG tablet     Sig: Take 1 tablet by mouth 2 times daily for 5 days Starting tomorrow     Dispense:  10 tablet     Refill:  0       Patient given educational materials - see patient instructions. Discussed use, benefit, and side effects of prescribed medications. All patientquestions answered. Pt voiced understanding. Reviewed health maintenance. Instructedto continue current medications, diet and exercise. Patient agreed with treatmentplan. Follow up as directed.      Electronicallysigned by TRAY Hendrix CNP on 9/25/2023 at 12:08 PM

## 2023-10-02 ENCOUNTER — OFFICE VISIT (OUTPATIENT)
Dept: ORTHOPEDIC SURGERY | Age: 73
End: 2023-10-02
Payer: MEDICARE

## 2023-10-02 VITALS — WEIGHT: 181 LBS | BODY MASS INDEX: 25.91 KG/M2 | RESPIRATION RATE: 14 BRPM | HEIGHT: 70 IN

## 2023-10-02 DIAGNOSIS — M25.512 LEFT SHOULDER PAIN, UNSPECIFIED CHRONICITY: Primary | ICD-10-CM

## 2023-10-02 PROCEDURE — 99212 OFFICE O/P EST SF 10 MIN: CPT | Performed by: ORTHOPAEDIC SURGERY

## 2023-10-02 PROCEDURE — 3017F COLORECTAL CA SCREEN DOC REV: CPT | Performed by: ORTHOPAEDIC SURGERY

## 2023-10-02 PROCEDURE — G8417 CALC BMI ABV UP PARAM F/U: HCPCS | Performed by: ORTHOPAEDIC SURGERY

## 2023-10-02 PROCEDURE — G8484 FLU IMMUNIZE NO ADMIN: HCPCS | Performed by: ORTHOPAEDIC SURGERY

## 2023-10-02 PROCEDURE — G8427 DOCREV CUR MEDS BY ELIG CLIN: HCPCS | Performed by: ORTHOPAEDIC SURGERY

## 2023-10-02 PROCEDURE — 1123F ACP DISCUSS/DSCN MKR DOCD: CPT | Performed by: ORTHOPAEDIC SURGERY

## 2023-10-02 PROCEDURE — 1036F TOBACCO NON-USER: CPT | Performed by: ORTHOPAEDIC SURGERY

## 2023-10-02 PROCEDURE — G8399 PT W/DXA RESULTS DOCUMENT: HCPCS | Performed by: ORTHOPAEDIC SURGERY

## 2023-10-02 PROCEDURE — 1090F PRES/ABSN URINE INCON ASSESS: CPT | Performed by: ORTHOPAEDIC SURGERY

## 2023-10-02 NOTE — PROGRESS NOTES
ORTHOPEDIC PATIENT EVALUATION      HPI / Chief Complaint  Sulma Bedolla is a 68 y.o. female who presents for evaluation of her left shoulder. Have seen her in the past for this shoulder and she was diagnosed with rotator cuff tendinitis versus a tear. She was treated with a cortisone injection and therapy. She was doing relatively well up until 2 weeks ago when she developed pain once again in the shoulder after picking up cases of formula. The pain starts in the lateral aspect of the shoulder radiating distally across the lateral aspect of the elbow. She was seen by her PCP recently who administered a Toradol injection and placed her on prednisone. Her pain has since gotten significantly better. 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 Past Surgical History  [employer]  has a past surgical history that includes Cholecystectomy (1972); Tonsillectomy and adenoidectomy (1963); Bunionectomy (Right, 1985); knee surgery (Right, 1986); Pyloroplasty (2005); Gastrostomy tube placement (2004); Gastric fundoplication (8407); Jejunostomy (2005); Upper gastrointestinal endoscopy (7081-1426); Tunneled venous port placement (2004); Endometrial ablation (2005); Breast biopsy (Left, 8/13/15); Abscess Drainage (03/08/2018); pr office/outpt visit,procedure only (N/A, 3/8/2018);  Gastrostomy tube placement (N/A, 8/19/2019); and US BREAST BIOPSY W LOC DEVICE 1ST LESION LEFT

## 2023-11-11 ENCOUNTER — HOSPITAL ENCOUNTER (OUTPATIENT)
Dept: MAMMOGRAPHY | Age: 73
End: 2023-11-11
Payer: MEDICARE

## 2023-11-11 VITALS — WEIGHT: 181 LBS | BODY MASS INDEX: 25.91 KG/M2 | HEIGHT: 70 IN

## 2023-11-11 DIAGNOSIS — Z12.31 ENCOUNTER FOR SCREENING MAMMOGRAM FOR BREAST CANCER: ICD-10-CM

## 2023-11-11 PROCEDURE — 77063 BREAST TOMOSYNTHESIS BI: CPT

## 2023-11-26 DIAGNOSIS — F51.01 PRIMARY INSOMNIA: ICD-10-CM

## 2023-11-26 DIAGNOSIS — F41.9 ANXIETY: Chronic | ICD-10-CM

## 2023-11-26 DIAGNOSIS — K52.9 CHRONIC DIARRHEA: ICD-10-CM

## 2023-11-27 RX ORDER — LORAZEPAM 1 MG/1
TABLET ORAL
Qty: 90 TABLET | Refills: 0 | Status: SHIPPED | OUTPATIENT
Start: 2023-11-27 | End: 2024-02-27

## 2023-11-27 RX ORDER — LOPERAMIDE HYDROCHLORIDE 2 MG/1
2 CAPSULE ORAL 4 TIMES DAILY PRN
Qty: 360 CAPSULE | Refills: 3 | Status: SHIPPED | OUTPATIENT
Start: 2023-11-27

## 2023-12-26 ENCOUNTER — OFFICE VISIT (OUTPATIENT)
Dept: PRIMARY CARE CLINIC | Age: 73
End: 2023-12-26
Payer: MEDICARE

## 2023-12-26 VITALS
WEIGHT: 182.6 LBS | HEART RATE: 61 BPM | OXYGEN SATURATION: 99 % | BODY MASS INDEX: 26.14 KG/M2 | HEIGHT: 70 IN | DIASTOLIC BLOOD PRESSURE: 68 MMHG | SYSTOLIC BLOOD PRESSURE: 124 MMHG

## 2023-12-26 DIAGNOSIS — M54.9 EXACERBATION OF CHRONIC BACK PAIN: Primary | ICD-10-CM

## 2023-12-26 DIAGNOSIS — G89.29 EXACERBATION OF CHRONIC BACK PAIN: Primary | ICD-10-CM

## 2023-12-26 DIAGNOSIS — M54.42 CHRONIC BILATERAL LOW BACK PAIN WITH BILATERAL SCIATICA: ICD-10-CM

## 2023-12-26 DIAGNOSIS — G89.29 CHRONIC BILATERAL LOW BACK PAIN WITH BILATERAL SCIATICA: ICD-10-CM

## 2023-12-26 DIAGNOSIS — M54.41 CHRONIC BILATERAL LOW BACK PAIN WITH BILATERAL SCIATICA: ICD-10-CM

## 2023-12-26 PROCEDURE — 3017F COLORECTAL CA SCREEN DOC REV: CPT | Performed by: NURSE PRACTITIONER

## 2023-12-26 PROCEDURE — 96372 THER/PROPH/DIAG INJ SC/IM: CPT | Performed by: NURSE PRACTITIONER

## 2023-12-26 PROCEDURE — G8399 PT W/DXA RESULTS DOCUMENT: HCPCS | Performed by: NURSE PRACTITIONER

## 2023-12-26 PROCEDURE — 1036F TOBACCO NON-USER: CPT | Performed by: NURSE PRACTITIONER

## 2023-12-26 PROCEDURE — 1090F PRES/ABSN URINE INCON ASSESS: CPT | Performed by: NURSE PRACTITIONER

## 2023-12-26 PROCEDURE — 3078F DIAST BP <80 MM HG: CPT | Performed by: NURSE PRACTITIONER

## 2023-12-26 PROCEDURE — 1123F ACP DISCUSS/DSCN MKR DOCD: CPT | Performed by: NURSE PRACTITIONER

## 2023-12-26 PROCEDURE — 3074F SYST BP LT 130 MM HG: CPT | Performed by: NURSE PRACTITIONER

## 2023-12-26 PROCEDURE — G8427 DOCREV CUR MEDS BY ELIG CLIN: HCPCS | Performed by: NURSE PRACTITIONER

## 2023-12-26 PROCEDURE — G8417 CALC BMI ABV UP PARAM F/U: HCPCS | Performed by: NURSE PRACTITIONER

## 2023-12-26 PROCEDURE — 99214 OFFICE O/P EST MOD 30 MIN: CPT | Performed by: NURSE PRACTITIONER

## 2023-12-26 PROCEDURE — G8484 FLU IMMUNIZE NO ADMIN: HCPCS | Performed by: NURSE PRACTITIONER

## 2023-12-26 RX ORDER — METHYLPREDNISOLONE ACETATE 80 MG/ML
80 INJECTION, SUSPENSION INTRA-ARTICULAR; INTRALESIONAL; INTRAMUSCULAR; SOFT TISSUE ONCE
Status: COMPLETED | OUTPATIENT
Start: 2023-12-26 | End: 2023-12-26

## 2023-12-26 RX ORDER — KETOROLAC TROMETHAMINE 30 MG/ML
60 INJECTION, SOLUTION INTRAMUSCULAR; INTRAVENOUS ONCE
Status: COMPLETED | OUTPATIENT
Start: 2023-12-26 | End: 2023-12-26

## 2023-12-26 RX ORDER — METHYLPREDNISOLONE 4 MG/1
TABLET ORAL
Qty: 1 KIT | Refills: 0 | Status: SHIPPED | OUTPATIENT
Start: 2023-12-26 | End: 2024-01-01

## 2023-12-26 RX ADMIN — KETOROLAC TROMETHAMINE 60 MG: 30 INJECTION, SOLUTION INTRAMUSCULAR; INTRAVENOUS at 10:17

## 2023-12-26 RX ADMIN — METHYLPREDNISOLONE ACETATE 80 MG: 80 INJECTION, SUSPENSION INTRA-ARTICULAR; INTRALESIONAL; INTRAMUSCULAR; SOFT TISSUE at 10:21

## 2023-12-26 NOTE — PROGRESS NOTES
AGGITATED      Sulfa Antibiotics Rash    Vancomycin Other (See Comments)     HALLUCINATON    Vfend [Voriconazole] Other (See Comments)     hallucinations       Health Maintenance   Topic Date Due    Shingles vaccine (2 of 2) 12/07/2021    GFR test (Diabetes, CKD 3-4, OR last GFR 15-59)  05/31/2023    COVID-19 Vaccine (6 - 2023-24 season) 09/01/2023    Annual Wellness Visit (AWV)  07/10/2024    Depression Monitoring  09/25/2024    Breast cancer screen  11/11/2025    DTaP/Tdap/Td vaccine (2 - Td or Tdap) 04/01/2026    Lipids  05/28/2026    Colorectal Cancer Screen  05/09/2029    DEXA (modify frequency per FRAX score)  Completed    Flu vaccine  Completed    Pneumococcal 65+ years Vaccine  Completed    Respiratory Syncytial Virus (RSV) Pregnant or age 61 yrs+  Completed    Hepatitis C screen  Completed    Hepatitis A vaccine  Aged Out    Hepatitis B vaccine  Aged Out    Hib vaccine  Aged Out    Polio vaccine  Aged Out    Meningococcal (ACWY) vaccine  Aged Out       Subjective:      Review of Systems   Constitutional:  Negative for activity change, appetite change, chills, fatigue, fever and unexpected weight change. HENT:  Negative for congestion, ear pain, hearing loss, sinus pressure, sore throat and trouble swallowing. Eyes:  Negative for visual disturbance. Respiratory:  Negative for cough, shortness of breath and wheezing. Cardiovascular:  Negative for chest pain, palpitations and leg swelling. Gastrointestinal:  Negative for abdominal pain, blood in stool, constipation, diarrhea, nausea and vomiting. Endocrine: Negative for cold intolerance, heat intolerance, polydipsia, polyphagia and polyuria. Genitourinary:  Negative for difficulty urinating, frequency, hematuria and urgency. Musculoskeletal:  Positive for arthralgias and back pain. Negative for myalgias. Skin:  Negative for rash. Allergic/Immunologic: Negative for environmental allergies.    Neurological:  Negative for dizziness, weakness,

## 2024-01-15 DIAGNOSIS — K90.9 INTESTINAL MALABSORPTION: ICD-10-CM

## 2024-01-15 DIAGNOSIS — K31.84 GASTROPARESIS: ICD-10-CM

## 2024-01-16 RX ORDER — CYANOCOBALAMIN 1000 UG/ML
INJECTION, SOLUTION INTRAMUSCULAR; SUBCUTANEOUS
Qty: 3 ML | Refills: 5 | Status: SHIPPED | OUTPATIENT
Start: 2024-01-16

## 2024-02-27 DIAGNOSIS — F51.01 PRIMARY INSOMNIA: ICD-10-CM

## 2024-02-27 DIAGNOSIS — F41.9 ANXIETY: Chronic | ICD-10-CM

## 2024-02-28 RX ORDER — LORAZEPAM 1 MG/1
TABLET ORAL
Qty: 90 TABLET | Refills: 0 | Status: SHIPPED | OUTPATIENT
Start: 2024-02-28 | End: 2024-05-28

## 2024-03-26 ENCOUNTER — OFFICE VISIT (OUTPATIENT)
Dept: PRIMARY CARE CLINIC | Age: 74
End: 2024-03-26
Payer: MEDICARE

## 2024-03-26 ENCOUNTER — HOSPITAL ENCOUNTER (OUTPATIENT)
Age: 74
Setting detail: SPECIMEN
Discharge: HOME | End: 2024-03-26

## 2024-03-26 VITALS
SYSTOLIC BLOOD PRESSURE: 128 MMHG | BODY MASS INDEX: 26.4 KG/M2 | WEIGHT: 184 LBS | HEART RATE: 66 BPM | DIASTOLIC BLOOD PRESSURE: 72 MMHG | OXYGEN SATURATION: 100 % | RESPIRATION RATE: 18 BRPM

## 2024-03-26 DIAGNOSIS — L02.91 ABSCESS: ICD-10-CM

## 2024-03-26 DIAGNOSIS — K94.22 INFECTION OF PEG SITE (HCC): ICD-10-CM

## 2024-03-26 DIAGNOSIS — K94.22 INFECTION OF PEG SITE (HCC): Primary | ICD-10-CM

## 2024-03-26 PROCEDURE — 1036F TOBACCO NON-USER: CPT | Performed by: NURSE PRACTITIONER

## 2024-03-26 PROCEDURE — G8399 PT W/DXA RESULTS DOCUMENT: HCPCS | Performed by: NURSE PRACTITIONER

## 2024-03-26 PROCEDURE — 3078F DIAST BP <80 MM HG: CPT | Performed by: NURSE PRACTITIONER

## 2024-03-26 PROCEDURE — G8484 FLU IMMUNIZE NO ADMIN: HCPCS | Performed by: NURSE PRACTITIONER

## 2024-03-26 PROCEDURE — G8417 CALC BMI ABV UP PARAM F/U: HCPCS | Performed by: NURSE PRACTITIONER

## 2024-03-26 PROCEDURE — 3017F COLORECTAL CA SCREEN DOC REV: CPT | Performed by: NURSE PRACTITIONER

## 2024-03-26 PROCEDURE — 99214 OFFICE O/P EST MOD 30 MIN: CPT | Performed by: NURSE PRACTITIONER

## 2024-03-26 PROCEDURE — 1123F ACP DISCUSS/DSCN MKR DOCD: CPT | Performed by: NURSE PRACTITIONER

## 2024-03-26 PROCEDURE — 3074F SYST BP LT 130 MM HG: CPT | Performed by: NURSE PRACTITIONER

## 2024-03-26 PROCEDURE — 1090F PRES/ABSN URINE INCON ASSESS: CPT | Performed by: NURSE PRACTITIONER

## 2024-03-26 PROCEDURE — G8427 DOCREV CUR MEDS BY ELIG CLIN: HCPCS | Performed by: NURSE PRACTITIONER

## 2024-03-26 RX ORDER — DOXYCYCLINE HYCLATE 100 MG
100 TABLET ORAL 2 TIMES DAILY
Qty: 20 TABLET | Refills: 0 | Status: SHIPPED | OUTPATIENT
Start: 2024-03-26 | End: 2024-04-05

## 2024-03-26 ASSESSMENT — ENCOUNTER SYMPTOMS
NAUSEA: 0
COUGH: 0
BLOOD IN STOOL: 0
VOMITING: 0
DIARRHEA: 0
CONSTIPATION: 0
SINUS PRESSURE: 0
SORE THROAT: 0
ABDOMINAL PAIN: 0
WHEEZING: 0
TROUBLE SWALLOWING: 0
SHORTNESS OF BREATH: 0

## 2024-03-26 ASSESSMENT — PATIENT HEALTH QUESTIONNAIRE - PHQ9: DEPRESSION UNABLE TO ASSESS: PT REFUSES

## 2024-03-26 NOTE — PROGRESS NOTES
[facility]  [facility]  [address] [place] [address] [address]  [address]  [facility] [address]  Dept: [phone]  Dept Fax: [phone]    Valerie New is a 73 y.o. female who presentstoday for her medical conditions/complaints as noted below.  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  However, she has not had an infection for over 1 year  She states historically they will break open which will offer pain relief  She states she has done this occasionally herself at home with a Betadine swab  She is established with infectious diseases but has difficulty getting appointment at times when it flares up.  She has not been seen by them for 2 years.  She is requesting a referral to the same provider that her spouse sees for infectious diseases          No results found for: \"LABA1C\"          ( goal A1C is < 7)   No components found for: \"LABMICR\"  LDL Cholesterol (mg/dL)   Date Value   05/28/2021 107   08/15/2019 107     LDL Calculated (mg/dL)   Date Value   06/28/2018 108   09/20/2017 107       (goal LDL is <100)   AST (U/L)   Date Value   02/16/2022 14     ALT (U/L)   Date Value   02/16/2022 15     BUN (mg/dL)   Date Value   05/31/2022 15     BP Readings from Last 3 Encounters:   03/26/24 128/72   02/29/24 138/84   12/26/23 124/68          (ipct080/80)    Past Medical History:   Diagnosis Date    Abdominal pain     Anxiety     CVID (common variable immunodeficiency) (HCC)     Depression     Diarrhea     Difficulty swallowing     Dizziness     Fibromyalgia     Gastroparesis

## 2024-03-31 LAB
MICROORGANISM SPEC CULT: ABNORMAL
MICROORGANISM/AGENT SPEC: ABNORMAL
SPECIMEN DESCRIPTION: ABNORMAL

## 2024-04-09 ENCOUNTER — HOSPITAL ENCOUNTER (OUTPATIENT)
Age: 74
Setting detail: SPECIMEN
Discharge: HOME | End: 2024-04-09
Payer: MEDICARE

## 2024-04-09 ENCOUNTER — TELEPHONE (OUTPATIENT)
Dept: INFUSION THERAPY | Age: 74
End: 2024-04-09

## 2024-04-09 DIAGNOSIS — D50.8 OTHER IRON DEFICIENCY ANEMIA: Primary | ICD-10-CM

## 2024-04-09 DIAGNOSIS — D50.8 OTHER IRON DEFICIENCY ANEMIA: ICD-10-CM

## 2024-04-09 LAB
BASOPHILS # BLD: 0.04 K/UL (ref 0–0.2)
BASOPHILS NFR BLD: 1 % (ref 0–2)
EOSINOPHIL # BLD: 0.32 K/UL (ref 0–0.44)
EOSINOPHILS RELATIVE PERCENT: 6 % (ref 1–4)
ERYTHROCYTE [DISTWIDTH] IN BLOOD BY AUTOMATED COUNT: 13.9 % (ref 11.8–14.4)
FERRITIN SERPL-MCNC: 30 NG/ML (ref 13–150)
HCT VFR BLD AUTO: 34.5 % (ref 36.3–47.1)
HGB BLD-MCNC: 11.4 G/DL (ref 11.9–15.1)
IMM GRANULOCYTES # BLD AUTO: 0.01 K/UL (ref 0–0.3)
IMM GRANULOCYTES NFR BLD: 0 %
IRON SATN MFR SERPL: 29 % (ref 20–55)
IRON SERPL-MCNC: 86 UG/DL (ref 37–145)
LYMPHOCYTES NFR BLD: 1.47 K/UL (ref 1.1–3.7)
LYMPHOCYTES RELATIVE PERCENT: 28 % (ref 24–43)
MCH RBC QN AUTO: 32 PG (ref 25.2–33.5)
MCHC RBC AUTO-ENTMCNC: 33 G/DL (ref 28.4–34.8)
MCV RBC AUTO: 96.9 FL (ref 82.6–102.9)
MONOCYTES NFR BLD: 0.4 K/UL (ref 0.1–1.2)
MONOCYTES NFR BLD: 8 % (ref 3–12)
NEUTROPHILS NFR BLD: 57 % (ref 36–65)
NEUTS SEG NFR BLD: 3.07 K/UL (ref 1.5–8.1)
NRBC BLD-RTO: 0 PER 100 WBC
PLATELET # BLD AUTO: 227 K/UL (ref 138–453)
PMV BLD AUTO: 10.6 FL (ref 8.1–13.5)
RBC # BLD AUTO: 3.56 M/UL (ref 3.95–5.11)
TIBC SERPL-MCNC: 292 UG/DL (ref 250–450)
UNSATURATED IRON BINDING CAPACITY: 206 UG/DL (ref 112–347)
WBC OTHER # BLD: 5.3 K/UL (ref 3.5–11.3)

## 2024-04-09 PROCEDURE — 85025 COMPLETE CBC W/AUTO DIFF WBC: CPT

## 2024-04-09 PROCEDURE — 82728 ASSAY OF FERRITIN: CPT

## 2024-04-09 PROCEDURE — 36415 COLL VENOUS BLD VENIPUNCTURE: CPT

## 2024-04-09 PROCEDURE — 83550 IRON BINDING TEST: CPT

## 2024-04-09 PROCEDURE — 83540 ASSAY OF IRON: CPT

## 2024-04-09 NOTE — TELEPHONE ENCOUNTER
Pt requesting to iron levels checked due to her fatigue and tiredness. Pt seen by Dr Muller for iron malabsorption. Orders placed and pt updated.

## 2024-05-03 DIAGNOSIS — D50.8 OTHER IRON DEFICIENCY ANEMIA: ICD-10-CM

## 2024-05-03 DIAGNOSIS — K90.9 IRON MALABSORPTION: Primary | ICD-10-CM

## 2024-05-17 DIAGNOSIS — F51.01 PRIMARY INSOMNIA: ICD-10-CM

## 2024-05-17 DIAGNOSIS — F41.9 ANXIETY: Chronic | ICD-10-CM

## 2024-05-18 NOTE — TELEPHONE ENCOUNTER
Last OV:  3/26/2024    Next OV: 6/27/2024    Pharmacy:     [facility] - [place], [place] - [address] [place] [address] - P [phone] - F [phone]  [address] [place] [address]  [address]  [facility] [address]  Phone: [phone] Fax: [phone]       Confirmed? Yes

## 2024-05-20 RX ORDER — LORAZEPAM 1 MG/1
TABLET ORAL
Qty: 90 TABLET | Refills: 0 | Status: SHIPPED | OUTPATIENT
Start: 2024-05-20 | End: 2024-05-21

## 2024-05-23 ENCOUNTER — HOSPITAL ENCOUNTER (OUTPATIENT)
Age: 74
Discharge: HOME | End: 2024-05-23
Payer: MEDICARE

## 2024-05-23 DIAGNOSIS — D50.8 OTHER IRON DEFICIENCY ANEMIA: ICD-10-CM

## 2024-05-23 DIAGNOSIS — K90.9 IRON MALABSORPTION: ICD-10-CM

## 2024-05-23 LAB
FERRITIN SERPL-MCNC: 19 NG/ML (ref 13–150)
FOLATE SERPL-MCNC: 5.7 NG/ML (ref 4.8–24.2)
IRON SATN MFR SERPL: 20 % (ref 20–55)
IRON SERPL-MCNC: 76 UG/DL (ref 37–145)
TIBC SERPL-MCNC: 373 UG/DL (ref 250–450)
UNSATURATED IRON BINDING CAPACITY: 297 UG/DL (ref 112–347)
VIT B12 SERPL-MCNC: 554 PG/ML (ref 232–1245)

## 2024-05-23 PROCEDURE — 36415 COLL VENOUS BLD VENIPUNCTURE: CPT

## 2024-05-23 PROCEDURE — 83540 ASSAY OF IRON: CPT

## 2024-05-23 PROCEDURE — 82728 ASSAY OF FERRITIN: CPT

## 2024-05-23 PROCEDURE — 82746 ASSAY OF FOLIC ACID SERUM: CPT

## 2024-05-23 PROCEDURE — 82607 VITAMIN B-12: CPT

## 2024-05-23 PROCEDURE — 83550 IRON BINDING TEST: CPT

## 2024-06-25 ASSESSMENT — PATIENT HEALTH QUESTIONNAIRE - PHQ9
5. POOR APPETITE OR OVEREATING: NOT AT ALL
7. TROUBLE CONCENTRATING ON THINGS, SUCH AS READING THE NEWSPAPER OR WATCHING TELEVISION: NOT AT ALL
2. FEELING DOWN, DEPRESSED OR HOPELESS: SEVERAL DAYS
SUM OF ALL RESPONSES TO PHQ QUESTIONS 1-9: 6
10. IF YOU CHECKED OFF ANY PROBLEMS, HOW DIFFICULT HAVE THESE PROBLEMS MADE IT FOR YOU TO DO YOUR WORK, TAKE CARE OF THINGS AT HOME, OR GET ALONG WITH OTHER PEOPLE: NOT DIFFICULT AT ALL
2. FEELING DOWN, DEPRESSED OR HOPELESS: SEVERAL DAYS
4. FEELING TIRED OR HAVING LITTLE ENERGY: MORE THAN HALF THE DAYS
5. POOR APPETITE OR OVEREATING: NOT AT ALL
1. LITTLE INTEREST OR PLEASURE IN DOING THINGS: NOT AT ALL
8. MOVING OR SPEAKING SO SLOWLY THAT OTHER PEOPLE COULD HAVE NOTICED. OR THE OPPOSITE - BEING SO FIDGETY OR RESTLESS THAT YOU HAVE BEEN MOVING AROUND A LOT MORE THAN USUAL: NOT AT ALL
1. LITTLE INTEREST OR PLEASURE IN DOING THINGS: NOT AT ALL
SUM OF ALL RESPONSES TO PHQ QUESTIONS 1-9: 6
SUM OF ALL RESPONSES TO PHQ9 QUESTIONS 1 & 2: 1
6. FEELING BAD ABOUT YOURSELF - OR THAT YOU ARE A FAILURE OR HAVE LET YOURSELF OR YOUR FAMILY DOWN: SEVERAL DAYS
SUM OF ALL RESPONSES TO PHQ QUESTIONS 1-9: 6
SUM OF ALL RESPONSES TO PHQ QUESTIONS 1-9: 6
4. FEELING TIRED OR HAVING LITTLE ENERGY: MORE THAN HALF THE DAYS
3. TROUBLE FALLING OR STAYING ASLEEP: MORE THAN HALF THE DAYS
6. FEELING BAD ABOUT YOURSELF - OR THAT YOU ARE A FAILURE OR HAVE LET YOURSELF OR YOUR FAMILY DOWN: SEVERAL DAYS
8. MOVING OR SPEAKING SO SLOWLY THAT OTHER PEOPLE COULD HAVE NOTICED. OR THE OPPOSITE, BEING SO FIGETY OR RESTLESS THAT YOU HAVE BEEN MOVING AROUND A LOT MORE THAN USUAL: NOT AT ALL
9. THOUGHTS THAT YOU WOULD BE BETTER OFF DEAD, OR OF HURTING YOURSELF: NOT AT ALL
9. THOUGHTS THAT YOU WOULD BE BETTER OFF DEAD, OR OF HURTING YOURSELF: NOT AT ALL
SUM OF ALL RESPONSES TO PHQ QUESTIONS 1-9: 6
3. TROUBLE FALLING OR STAYING ASLEEP: MORE THAN HALF THE DAYS
7. TROUBLE CONCENTRATING ON THINGS, SUCH AS READING THE NEWSPAPER OR WATCHING TELEVISION: NOT AT ALL
10. IF YOU CHECKED OFF ANY PROBLEMS, HOW DIFFICULT HAVE THESE PROBLEMS MADE IT FOR YOU TO DO YOUR WORK, TAKE CARE OF THINGS AT HOME, OR GET ALONG WITH OTHER PEOPLE: NOT DIFFICULT AT ALL

## 2024-06-27 ENCOUNTER — OFFICE VISIT (OUTPATIENT)
Dept: PRIMARY CARE CLINIC | Age: 74
End: 2024-06-27

## 2024-06-27 ENCOUNTER — HOSPITAL ENCOUNTER (OUTPATIENT)
Age: 74
Setting detail: SPECIMEN
Discharge: HOME | End: 2024-06-27

## 2024-06-27 VITALS
SYSTOLIC BLOOD PRESSURE: 134 MMHG | BODY MASS INDEX: 25.77 KG/M2 | HEART RATE: 64 BPM | DIASTOLIC BLOOD PRESSURE: 70 MMHG | WEIGHT: 179.6 LBS | OXYGEN SATURATION: 99 %

## 2024-06-27 DIAGNOSIS — F32.4 MAJOR DEPRESSIVE DISORDER WITH SINGLE EPISODE, IN PARTIAL REMISSION (HCC): ICD-10-CM

## 2024-06-27 DIAGNOSIS — I10 ESSENTIAL HYPERTENSION: ICD-10-CM

## 2024-06-27 DIAGNOSIS — E03.9 HYPOTHYROIDISM, UNSPECIFIED TYPE: ICD-10-CM

## 2024-06-27 DIAGNOSIS — G25.81 RLS (RESTLESS LEGS SYNDROME): ICD-10-CM

## 2024-06-27 DIAGNOSIS — N18.31 STAGE 3A CHRONIC KIDNEY DISEASE (HCC): ICD-10-CM

## 2024-06-27 DIAGNOSIS — D50.8 OTHER IRON DEFICIENCY ANEMIA: ICD-10-CM

## 2024-06-27 DIAGNOSIS — F51.01 PRIMARY INSOMNIA: ICD-10-CM

## 2024-06-27 DIAGNOSIS — F41.9 ANXIETY: ICD-10-CM

## 2024-06-27 DIAGNOSIS — D83.9 CVID (COMMON VARIABLE IMMUNODEFICIENCY) (HCC): ICD-10-CM

## 2024-06-27 DIAGNOSIS — I10 ESSENTIAL HYPERTENSION: Primary | ICD-10-CM

## 2024-06-27 DIAGNOSIS — R53.83 FATIGUE, UNSPECIFIED TYPE: ICD-10-CM

## 2024-06-27 DIAGNOSIS — Z13.29 SCREENING FOR THYROID DISORDER: ICD-10-CM

## 2024-06-27 LAB
ALBUMIN SERPL-MCNC: 4.4 G/DL (ref 3.5–5.2)
ALBUMIN/GLOB SERPL: 2 {RATIO} (ref 1–2.5)
ALP SERPL-CCNC: 131 U/L (ref 35–104)
ALT SERPL-CCNC: 6 U/L (ref 10–35)
ANION GAP SERPL CALCULATED.3IONS-SCNC: 13 MMOL/L (ref 9–16)
AST SERPL-CCNC: 20 U/L (ref 10–35)
BASOPHILS # BLD: 0.07 K/UL (ref 0–0.2)
BASOPHILS NFR BLD: 1 % (ref 0–2)
BILIRUB SERPL-MCNC: 0.3 MG/DL (ref 0–1.2)
BUN SERPL-MCNC: 14 MG/DL (ref 8–23)
CALCIUM SERPL-MCNC: 9.3 MG/DL (ref 8.6–10.4)
CHLORIDE SERPL-SCNC: 108 MMOL/L (ref 98–107)
CHOLEST SERPL-MCNC: 204 MG/DL (ref 0–199)
CHOLESTEROL/HDL RATIO: 4
CO2 SERPL-SCNC: 21 MMOL/L (ref 20–31)
CREAT SERPL-MCNC: 1.2 MG/DL (ref 0.5–0.9)
EOSINOPHIL # BLD: 0.31 K/UL (ref 0–0.44)
EOSINOPHILS RELATIVE PERCENT: 5 % (ref 1–4)
ERYTHROCYTE [DISTWIDTH] IN BLOOD BY AUTOMATED COUNT: 14.6 % (ref 11.8–14.4)
GFR, ESTIMATED: 49 ML/MIN/1.73M2
GLUCOSE SERPL-MCNC: 76 MG/DL (ref 74–99)
HCT VFR BLD AUTO: 36.4 % (ref 36.3–47.1)
HDLC SERPL-MCNC: 58 MG/DL
HGB BLD-MCNC: 11.4 G/DL (ref 11.9–15.1)
IMM GRANULOCYTES # BLD AUTO: <0.03 K/UL (ref 0–0.3)
IMM GRANULOCYTES NFR BLD: 0 %
LDLC SERPL CALC-MCNC: 124 MG/DL (ref 0–100)
LYMPHOCYTES NFR BLD: 1.69 K/UL (ref 1.1–3.7)
LYMPHOCYTES RELATIVE PERCENT: 26 % (ref 24–43)
MCH RBC QN AUTO: 30.3 PG (ref 25.2–33.5)
MCHC RBC AUTO-ENTMCNC: 31.3 G/DL (ref 28.4–34.8)
MCV RBC AUTO: 96.8 FL (ref 82.6–102.9)
MONOCYTES NFR BLD: 0.56 K/UL (ref 0.1–1.2)
MONOCYTES NFR BLD: 9 % (ref 3–12)
NEUTROPHILS NFR BLD: 59 % (ref 36–65)
NEUTS SEG NFR BLD: 3.77 K/UL (ref 1.5–8.1)
NRBC BLD-RTO: 0 PER 100 WBC
PLATELET # BLD AUTO: 257 K/UL (ref 138–453)
PMV BLD AUTO: 12.2 FL (ref 8.1–13.5)
POTASSIUM SERPL-SCNC: 4.7 MMOL/L (ref 3.7–5.3)
PROT SERPL-MCNC: 6.4 G/DL (ref 6.6–8.7)
RBC # BLD AUTO: 3.76 M/UL (ref 3.95–5.11)
RBC # BLD: ABNORMAL 10*6/UL
SODIUM SERPL-SCNC: 142 MMOL/L (ref 136–145)
TRIGL SERPL-MCNC: 111 MG/DL
TSH SERPL DL<=0.05 MIU/L-ACNC: 4.76 UIU/ML (ref 0.27–4.2)
VLDLC SERPL CALC-MCNC: 22 MG/DL
WBC OTHER # BLD: 6.4 K/UL (ref 3.5–11.3)

## 2024-06-27 RX ORDER — ROPINIROLE 0.25 MG/1
0.25 TABLET, FILM COATED ORAL NIGHTLY
Qty: 90 TABLET | Refills: 3 | Status: SHIPPED | OUTPATIENT
Start: 2024-06-27

## 2024-06-27 ASSESSMENT — ENCOUNTER SYMPTOMS
COUGH: 0
NAUSEA: 0
WHEEZING: 0
TROUBLE SWALLOWING: 0
SHORTNESS OF BREATH: 0
CONSTIPATION: 0
DIARRHEA: 0
ABDOMINAL PAIN: 0
SORE THROAT: 0
VOMITING: 0
SINUS PRESSURE: 0
BLOOD IN STOOL: 0

## 2024-06-27 NOTE — PROGRESS NOTES
[facility]  [facility]  [address] [place] [address] [address]  [facility] [address]  Dept: [phone]  Dept Fax: [phone]    Valerie New is a 73 y.o. female who presentstoday for her medical conditions/complaints as noted below.  Valerie New is c/o of  Chief Complaint   Patient presents with    Hypertension    Anxiety    Depression    Fatigue     Tired all the time, lets Dr Howe know       HPI:     Here today for follow up  Having increased fatigue and difficulty sleeping  States will typically get 3-4  hours per night even when she takes ativan  She states she does not want to increase her Ativan  Reports adverse reaction to Benadryl as this tends to make her more wakeful  She has never tried prescription medications for sleep  She states she is compliant with her Zoloft daily  Reports has also discussed her feeling of fatigue with her hematologist as felt maybe it was related to her anemia.  However, recent labs revealed adequate vitamin levels    Hypertension  This is a chronic problem. The current episode started more than 1 year ago. The problem is controlled. Pertinent negatives include no chest pain, headaches, palpitations, peripheral edema or shortness of breath. Past treatments include calcium channel blockers and angiotensin blockers. The current treatment provides significant improvement. There are no compliance problems.  There is no history of CAD/MI.       No results found for: \"LABA1C\"          ( goal A1C is < 7)   No components found for: \"LABMICR\"  No components found for: \"LDLCHOLESTEROL\", \"LDLCALC\"    (goal LDL is <100)   AST (U/L)   Date Value   02/16/2022 14     ALT (U/L)   Date Value   02/16/2022 15     BUN (mg/dL)   Date Value   05/31/2022 15     BP Readings from Last 3 Encounters:   06/27/24 134/70   03/26/24 128/72   02/29/24 138/84          ([id number])    Past Medical History:   Diagnosis Date    Abdominal pain     Anxiety     CVID

## 2024-06-28 RX ORDER — LEVOTHYROXINE SODIUM 0.03 MG/1
25 TABLET ORAL DAILY
Qty: 90 TABLET | Refills: 1 | Status: SHIPPED | OUTPATIENT
Start: 2024-06-28 | End: 2024-06-28

## 2024-06-28 RX ORDER — LEVOTHYROXINE SODIUM 0.03 MG/1
25 TABLET ORAL DAILY
Qty: 90 TABLET | Refills: 1 | Status: SHIPPED | OUTPATIENT
Start: 2024-06-28

## 2024-08-17 DIAGNOSIS — F51.01 PRIMARY INSOMNIA: ICD-10-CM

## 2024-08-17 DIAGNOSIS — F41.9 ANXIETY: Chronic | ICD-10-CM

## 2024-08-19 RX ORDER — SERTRALINE HYDROCHLORIDE 100 MG/1
TABLET, FILM COATED ORAL
Qty: 135 TABLET | Refills: 3 | Status: SHIPPED | OUTPATIENT
Start: 2024-08-19

## 2024-08-19 RX ORDER — LORAZEPAM 1 MG/1
TABLET ORAL
Qty: 90 TABLET | Refills: 0 | Status: SHIPPED | OUTPATIENT
Start: 2024-08-19 | End: 2024-09-19

## 2024-10-08 ENCOUNTER — HOSPITAL ENCOUNTER (OUTPATIENT)
Age: 74
Setting detail: SPECIMEN
Discharge: HOME | End: 2024-10-08

## 2024-10-08 DIAGNOSIS — E03.9 HYPOTHYROIDISM, UNSPECIFIED TYPE: ICD-10-CM

## 2024-10-08 LAB
T4 FREE SERPL-MCNC: 1 NG/DL (ref 0.92–1.68)
TSH SERPL DL<=0.05 MIU/L-ACNC: 3.65 UIU/ML (ref 0.27–4.2)

## 2024-10-25 ENCOUNTER — PATIENT MESSAGE (OUTPATIENT)
Dept: PRIMARY CARE CLINIC | Age: 74
End: 2024-10-25

## 2024-10-25 RX ORDER — AZITHROMYCIN 250 MG/1
TABLET, FILM COATED ORAL
Qty: 1 PACKET | Refills: 0 | Status: SHIPPED | OUTPATIENT
Start: 2024-10-25

## 2024-10-31 DIAGNOSIS — E03.9 HYPOTHYROIDISM, UNSPECIFIED TYPE: ICD-10-CM

## 2024-10-31 RX ORDER — LEVOTHYROXINE SODIUM 25 UG/1
25 TABLET ORAL DAILY
Qty: 90 TABLET | Refills: 3 | Status: SHIPPED | OUTPATIENT
Start: 2024-10-31

## 2024-11-08 DIAGNOSIS — F51.01 PRIMARY INSOMNIA: ICD-10-CM

## 2024-11-08 DIAGNOSIS — F41.9 ANXIETY: Chronic | ICD-10-CM

## 2024-11-08 RX ORDER — LORAZEPAM 1 MG/1
TABLET ORAL
Qty: 90 TABLET | Refills: 0 | Status: SHIPPED | OUTPATIENT
Start: 2024-11-08 | End: 2024-12-08

## 2024-11-23 ENCOUNTER — HOSPITAL ENCOUNTER (OUTPATIENT)
Dept: MAMMOGRAPHY | Age: 74
End: 2024-11-23
Payer: MEDICARE

## 2024-11-23 VITALS — HEIGHT: 70 IN | BODY MASS INDEX: 25.91 KG/M2 | WEIGHT: 181 LBS

## 2024-11-23 DIAGNOSIS — Z12.31 ENCOUNTER FOR SCREENING MAMMOGRAM FOR MALIGNANT NEOPLASM OF BREAST: ICD-10-CM

## 2024-11-23 PROCEDURE — 77063 BREAST TOMOSYNTHESIS BI: CPT

## 2024-12-10 NOTE — TELEPHONE ENCOUNTER
Last Visit Date: 12/26/2023   Next Visit Date: 6/27/2024    Up to bathroom to void, returned to room and up in recliner for comfort

## 2025-01-11 SDOH — ECONOMIC STABILITY: INCOME INSECURITY: IN THE LAST 12 MONTHS, WAS THERE A TIME WHEN YOU WERE NOT ABLE TO PAY THE MORTGAGE OR RENT ON TIME?: NO

## 2025-01-11 SDOH — ECONOMIC STABILITY: FOOD INSECURITY: WITHIN THE PAST 12 MONTHS, YOU WORRIED THAT YOUR FOOD WOULD RUN OUT BEFORE YOU GOT MONEY TO BUY MORE.: NEVER TRUE

## 2025-01-11 SDOH — ECONOMIC STABILITY: FOOD INSECURITY: WITHIN THE PAST 12 MONTHS, THE FOOD YOU BOUGHT JUST DIDN'T LAST AND YOU DIDN'T HAVE MONEY TO GET MORE.: NEVER TRUE

## 2025-01-11 SDOH — ECONOMIC STABILITY: TRANSPORTATION INSECURITY
IN THE PAST 12 MONTHS, HAS THE LACK OF TRANSPORTATION KEPT YOU FROM MEDICAL APPOINTMENTS OR FROM GETTING MEDICATIONS?: NO

## 2025-01-11 ASSESSMENT — PATIENT HEALTH QUESTIONNAIRE - PHQ9
5. POOR APPETITE OR OVEREATING: NOT AT ALL
10. IF YOU CHECKED OFF ANY PROBLEMS, HOW DIFFICULT HAVE THESE PROBLEMS MADE IT FOR YOU TO DO YOUR WORK, TAKE CARE OF THINGS AT HOME, OR GET ALONG WITH OTHER PEOPLE: NOT DIFFICULT AT ALL
7. TROUBLE CONCENTRATING ON THINGS, SUCH AS READING THE NEWSPAPER OR WATCHING TELEVISION: SEVERAL DAYS
2. FEELING DOWN, DEPRESSED OR HOPELESS: MORE THAN HALF THE DAYS
9. THOUGHTS THAT YOU WOULD BE BETTER OFF DEAD, OR OF HURTING YOURSELF: NOT AT ALL
3. TROUBLE FALLING OR STAYING ASLEEP: NEARLY EVERY DAY
SUM OF ALL RESPONSES TO PHQ9 QUESTIONS 1 & 2: 2
8. MOVING OR SPEAKING SO SLOWLY THAT OTHER PEOPLE COULD HAVE NOTICED. OR THE OPPOSITE - BEING SO FIDGETY OR RESTLESS THAT YOU HAVE BEEN MOVING AROUND A LOT MORE THAN USUAL: NOT AT ALL
SUM OF ALL RESPONSES TO PHQ QUESTIONS 1-9: 11
10. IF YOU CHECKED OFF ANY PROBLEMS, HOW DIFFICULT HAVE THESE PROBLEMS MADE IT FOR YOU TO DO YOUR WORK, TAKE CARE OF THINGS AT HOME, OR GET ALONG WITH OTHER PEOPLE: NOT DIFFICULT AT ALL
SUM OF ALL RESPONSES TO PHQ QUESTIONS 1-9: 11
6. FEELING BAD ABOUT YOURSELF - OR THAT YOU ARE A FAILURE OR HAVE LET YOURSELF OR YOUR FAMILY DOWN: MORE THAN HALF THE DAYS
8. MOVING OR SPEAKING SO SLOWLY THAT OTHER PEOPLE COULD HAVE NOTICED. OR THE OPPOSITE, BEING SO FIGETY OR RESTLESS THAT YOU HAVE BEEN MOVING AROUND A LOT MORE THAN USUAL: NOT AT ALL
5. POOR APPETITE OR OVEREATING: NOT AT ALL
9. THOUGHTS THAT YOU WOULD BE BETTER OFF DEAD, OR OF HURTING YOURSELF: NOT AT ALL
4. FEELING TIRED OR HAVING LITTLE ENERGY: NEARLY EVERY DAY
1. LITTLE INTEREST OR PLEASURE IN DOING THINGS: NOT AT ALL
1. LITTLE INTEREST OR PLEASURE IN DOING THINGS: NOT AT ALL
3. TROUBLE FALLING OR STAYING ASLEEP: NEARLY EVERY DAY
SUM OF ALL RESPONSES TO PHQ QUESTIONS 1-9: 11
SUM OF ALL RESPONSES TO PHQ QUESTIONS 1-9: 11
7. TROUBLE CONCENTRATING ON THINGS, SUCH AS READING THE NEWSPAPER OR WATCHING TELEVISION: SEVERAL DAYS
4. FEELING TIRED OR HAVING LITTLE ENERGY: NEARLY EVERY DAY
SUM OF ALL RESPONSES TO PHQ QUESTIONS 1-9: 11
6. FEELING BAD ABOUT YOURSELF - OR THAT YOU ARE A FAILURE OR HAVE LET YOURSELF OR YOUR FAMILY DOWN: MORE THAN HALF THE DAYS
2. FEELING DOWN, DEPRESSED OR HOPELESS: MORE THAN HALF THE DAYS

## 2025-01-14 ENCOUNTER — OFFICE VISIT (OUTPATIENT)
Dept: PRIMARY CARE CLINIC | Age: 75
End: 2025-01-14

## 2025-01-14 VITALS
BODY MASS INDEX: 25.28 KG/M2 | WEIGHT: 176.2 LBS | OXYGEN SATURATION: 99 % | HEART RATE: 65 BPM | DIASTOLIC BLOOD PRESSURE: 80 MMHG | SYSTOLIC BLOOD PRESSURE: 132 MMHG

## 2025-01-14 DIAGNOSIS — I83.93 SPIDER VEINS OF BOTH LOWER EXTREMITIES: Primary | ICD-10-CM

## 2025-01-14 DIAGNOSIS — L29.9 ITCHING: ICD-10-CM

## 2025-01-14 DIAGNOSIS — I83.93 ASYMPTOMATIC VARICOSE VEINS OF BILATERAL LOWER EXTREMITIES: ICD-10-CM

## 2025-01-14 RX ORDER — GABAPENTIN 100 MG/1
100 CAPSULE ORAL NIGHTLY
COMMUNITY

## 2025-01-14 RX ORDER — TRIAMCINOLONE ACETONIDE 5 MG/G
CREAM TOPICAL
Qty: 15 G | Refills: 2 | Status: SHIPPED | OUTPATIENT
Start: 2025-01-14

## 2025-01-14 ASSESSMENT — ENCOUNTER SYMPTOMS
VOMITING: 0
TROUBLE SWALLOWING: 0
NAUSEA: 0
ABDOMINAL PAIN: 0
CONSTIPATION: 0
SINUS PRESSURE: 0
WHEEZING: 0
DIARRHEA: 0
COUGH: 0
SORE THROAT: 0
SHORTNESS OF BREATH: 0
BLOOD IN STOOL: 0

## 2025-01-14 NOTE — PROGRESS NOTES
[facility]  [facility] PRIMARY [facility]ARE  [address] [place] [address] [address]  [address]  [facility] [address]  Dept: [phone]  Dept Fax: [phone]    Valerie New is a 74 y.o. female who presentstoday for her medical conditions/complaints as noted below.  Valerie New is c/o of  Chief Complaint   Patient presents with    Leg Problem     Spider veins and other larger veins.   Ankles itch at night         HPI:     History of Present Illness  The patient presents for worsening spider veins, varicosities and itching in the legs.    She has been experiencing pruritus in her bilateral lower extremities, specifically the ankles, for approximately one year. She reports that the condition is progressively worsening. She has observed an increase in the number of spider veins on her legs.  Has also noticed a few bulging veins on the right posterior calf  She expresses concern about this condition causing her neuropathy. She has attempted to manage the symptoms with a self-prepared oil concoction, applied topically to her veins and feet. This treatment has provided some relief for her neuropathy but has not alleviated the itching. She has previously used a 1 percent steroid anti-itch cream without success but found some relief with an over-the-counter anti-itch spray from [employer]      No results found for: \"LABA1C\"          ( goal A1C is < 7)   No components found for: \"LABMICR\"  No components found for: \"LDLCHOLESTEROL\", \"LDLCALC\"    (goal LDL is <100)   AST (U/L)   Date Value   06/27/2024 20     ALT (U/L)   Date Value   06/27/2024 6 (L)     BUN (mg/dL)   Date Value   06/27/2024 14     BP Readings from Last 3 Encounters:   01/14/25 132/80   06/27/24 134/70   03/26/24 128/72          ([id number])    Past Medical History:   Diagnosis Date    Abdominal pain     Anxiety     CVID (common variable immunodeficiency) (HCC)     Depression     Diarrhea     Difficulty swallowing     Dizziness     Fibromyalgia

## 2025-01-15 ENCOUNTER — TELEPHONE (OUTPATIENT)
Dept: PRIMARY CARE CLINIC | Age: 75
End: 2025-01-15

## 2025-01-15 DIAGNOSIS — I83.93 SPIDER VEINS OF BOTH LOWER EXTREMITIES: ICD-10-CM

## 2025-01-15 DIAGNOSIS — I83.93 ASYMPTOMATIC VARICOSE VEINS OF BILATERAL LOWER EXTREMITIES: Primary | ICD-10-CM

## 2025-01-15 NOTE — TELEPHONE ENCOUNTER
Shabbir called from [facility] vascular surgery. She reports that they do not repair veins if needed she recommend her to [facility]. She is canceling the referral to their office.   Fax : [phone]  [facility].    Please advise.

## 2025-02-11 ENCOUNTER — PATIENT MESSAGE (OUTPATIENT)
Dept: PRIMARY CARE CLINIC | Age: 75
End: 2025-02-11

## 2025-02-11 DIAGNOSIS — K31.84 GASTROPARESIS: Chronic | ICD-10-CM

## 2025-02-11 DIAGNOSIS — Z93.4 JEJUNOSTOMY TUBE PRESENT (HCC): Primary | Chronic | ICD-10-CM

## 2025-02-28 ENCOUNTER — OFFICE VISIT (OUTPATIENT)
Dept: PRIMARY CARE CLINIC | Age: 75
End: 2025-02-28

## 2025-02-28 VITALS
OXYGEN SATURATION: 98 % | BODY MASS INDEX: 25.28 KG/M2 | DIASTOLIC BLOOD PRESSURE: 72 MMHG | WEIGHT: 176.2 LBS | SYSTOLIC BLOOD PRESSURE: 130 MMHG | HEART RATE: 62 BPM

## 2025-02-28 DIAGNOSIS — M54.41 CHRONIC BILATERAL LOW BACK PAIN WITH BILATERAL SCIATICA: Primary | ICD-10-CM

## 2025-02-28 DIAGNOSIS — M54.42 CHRONIC BILATERAL LOW BACK PAIN WITH BILATERAL SCIATICA: Primary | ICD-10-CM

## 2025-02-28 DIAGNOSIS — R42 DIZZINESS: ICD-10-CM

## 2025-02-28 DIAGNOSIS — G89.29 CHRONIC BILATERAL LOW BACK PAIN WITH BILATERAL SCIATICA: Primary | ICD-10-CM

## 2025-02-28 DIAGNOSIS — I10 ESSENTIAL HYPERTENSION: Chronic | ICD-10-CM

## 2025-02-28 DIAGNOSIS — K31.84 GASTROPARESIS: ICD-10-CM

## 2025-02-28 DIAGNOSIS — D83.9 CVID (COMMON VARIABLE IMMUNODEFICIENCY) (HCC): ICD-10-CM

## 2025-02-28 DIAGNOSIS — Z93.4 JEJUNOSTOMY TUBE PRESENT (HCC): ICD-10-CM

## 2025-02-28 DIAGNOSIS — E03.9 HYPOTHYROIDISM, UNSPECIFIED TYPE: ICD-10-CM

## 2025-02-28 DIAGNOSIS — N18.31 STAGE 3A CHRONIC KIDNEY DISEASE (HCC): ICD-10-CM

## 2025-02-28 DIAGNOSIS — I83.93 SPIDER VEINS OF BOTH LOWER EXTREMITIES: ICD-10-CM

## 2025-02-28 PROBLEM — K94.22 INFECTION OF PEG SITE (HCC): Status: RESOLVED | Noted: 2024-03-26 | Resolved: 2025-02-28

## 2025-02-28 PROBLEM — K31.6 GASTROCUTANEOUS FISTULA DUE TO GASTROSTOMY TUBE: Chronic | Status: RESOLVED | Noted: 2019-06-11 | Resolved: 2025-02-28

## 2025-02-28 ASSESSMENT — ENCOUNTER SYMPTOMS
BLOOD IN STOOL: 0
COUGH: 0
SORE THROAT: 0
SINUS PRESSURE: 0
DIARRHEA: 0
NAUSEA: 0
VOMITING: 0
ABDOMINAL PAIN: 0
TROUBLE SWALLOWING: 0
BACK PAIN: 1
WHEEZING: 0
CONSTIPATION: 0
SHORTNESS OF BREATH: 0

## 2025-02-28 NOTE — PROGRESS NOTES
[facility]  [facility] PRIMARY CARE  [address] [place] [address] [address]  [address]  [facility] [address]  Dept: [phone]  Dept Fax: [phone]    Valerie New is a 74 y.o. female who presentstoday for her medical conditions/complaints as noted below.  Valerie New is c/o of  Chief Complaint   Patient presents with    Anxiety    Depression    Hypertension    Dizziness     And sick to stomach , called cardiologist and he decreased med from TID to BID         HPI:     History of Present Illness  The patient presents for follow-up    She was previously referred to vascular for the management of her spider veins. However, she was informed that they do not provide treatment for this condition. She has been using a prescribed cream to alleviate itching in her legs and ankles, which she applies as needed or prophylactically at night.  This has been helpful and she also utilizes support hose, noting that the symptoms subside upon removal but gradually return.  She does not desire further evaluation at this time    She has experienced 2 episodes of severe dizziness accompanied by profuse sweating, pallor, and a sensation of impending syncope.  She reports no chest pain, heaviness, or shortness of breath during these episodes. Her blood glucose level was recorded at 190 during one such episode, and her blood pressure was noted to be low, although not critically so. She is currently on a regimen of losartan and Cardizem and has been monitoring her blood pressure an hour before and 2 hours after medication intake. Her readings have consistently been around 110.  She is under the care of Dr. Burrows for cardiology and has an appointment scheduled for 03/19/2025.    Her pain management specialist has recommended the placement of a spinal stimulator, but she has declined due to concerns about potential infection.  She has been informed that her leg nerves are damaged and will not regenerate. She has been

## 2025-03-13 ENCOUNTER — TELEPHONE (OUTPATIENT)
Dept: PRIMARY CARE CLINIC | Age: 75
End: 2025-03-13

## 2025-03-13 NOTE — TELEPHONE ENCOUNTER
Alissa please clarify  She has a Jejunostomy tube due to gastroparesis. Therefor, she has absorption issues requiring the specialty formula  Thanks

## 2025-03-13 NOTE — TELEPHONE ENCOUNTER
Allison called back and is requesting information. Medicare wants to know why pt needs speciality formula for her tube feeding.

## 2025-03-13 NOTE — TELEPHONE ENCOUNTER
Kierra from [facility] called the office regarding the patient's feeding tube. She requested an office note that outlines the reasons for the patient's use of the feeding tube, as this is required for insurance purposes to ensure payment through Medicare.    Writer was able to fax the office note to her because patient and her provider spoke about it during the last appt.    Fax:[phone]

## 2025-03-13 NOTE — TELEPHONE ENCOUNTER
The patient called asking for Leigh Nichole CMA to contact her regarding issues that she is having for her tube feeding.  She states the company that is to give her the tube feeding is asking for more information and she would like to discuss with with Leigh Nichole CMA.    The patient is asking for call back.

## 2025-03-13 NOTE — TELEPHONE ENCOUNTER
Allison called back in regards to the office note that was faxed in today. The note will not qualify patient for tube feeding as it mentions she is supplementing with tube feeds and in order for Medicare to pay tube feeding has to be the primary source of nutrition.     Kierra spoke with the patient and patient is using 4-5 cartons a day. They are asking for a letter of medical necessity to be written that it is patient's primary source of nutrition.     Please fax letter to fax number listed above.

## 2025-03-23 DIAGNOSIS — K90.9 INTESTINAL MALABSORPTION: ICD-10-CM

## 2025-03-23 DIAGNOSIS — K52.9 CHRONIC DIARRHEA: ICD-10-CM

## 2025-03-23 DIAGNOSIS — K31.84 GASTROPARESIS: ICD-10-CM

## 2025-03-24 RX ORDER — CYANOCOBALAMIN 1000 UG/ML
INJECTION, SOLUTION INTRAMUSCULAR; SUBCUTANEOUS
Qty: 3 ML | Refills: 5 | Status: SHIPPED | OUTPATIENT
Start: 2025-03-24

## 2025-03-24 RX ORDER — LOPERAMIDE HYDROCHLORIDE 2 MG/1
2 CAPSULE ORAL 4 TIMES DAILY PRN
Qty: 360 CAPSULE | Refills: 3 | Status: SHIPPED | OUTPATIENT
Start: 2025-03-24

## 2025-04-08 DIAGNOSIS — F51.01 PRIMARY INSOMNIA: ICD-10-CM

## 2025-04-08 DIAGNOSIS — F41.9 ANXIETY: Chronic | ICD-10-CM

## 2025-04-09 RX ORDER — LORAZEPAM 1 MG/1
1 TABLET ORAL EVERY EVENING
Qty: 90 TABLET | Refills: 0 | Status: SHIPPED | OUTPATIENT
Start: 2025-04-09 | End: 2025-07-08

## 2025-04-23 ENCOUNTER — TRANSCRIBE ORDERS (OUTPATIENT)
Dept: ADMINISTRATIVE | Age: 75
End: 2025-04-23

## 2025-04-23 DIAGNOSIS — H90.3 SENSORINEURAL HEARING LOSS, BILATERAL: Primary | ICD-10-CM

## 2025-04-23 DIAGNOSIS — H93.293 OTHER ABNORMAL AUDITORY PERCEPTIONS, BILATERAL: ICD-10-CM

## 2025-04-23 DIAGNOSIS — R42 DIZZINESS AND GIDDINESS: ICD-10-CM

## 2025-04-24 ENCOUNTER — TRANSCRIBE ORDERS (OUTPATIENT)
Dept: ADMINISTRATIVE | Age: 75
End: 2025-04-24

## 2025-04-24 DIAGNOSIS — H90.3 SENSORINEURAL HEARING LOSS (SNHL) OF BOTH EARS: ICD-10-CM

## 2025-04-24 DIAGNOSIS — H93.293 OTHER ABNORMAL AUDITORY PERCEPTIONS, BILATERAL: ICD-10-CM

## 2025-04-24 DIAGNOSIS — R42 DIZZINESS AND GIDDINESS: Primary | ICD-10-CM

## 2025-05-12 ENCOUNTER — HOSPITAL ENCOUNTER (OUTPATIENT)
Dept: MRI IMAGING | Age: 75
Discharge: HOME | End: 2025-05-14
Payer: MEDICARE

## 2025-05-12 DIAGNOSIS — H93.293 OTHER ABNORMAL AUDITORY PERCEPTIONS, BILATERAL: ICD-10-CM

## 2025-05-12 DIAGNOSIS — R42 DIZZINESS AND GIDDINESS: ICD-10-CM

## 2025-05-12 DIAGNOSIS — H90.3 SENSORINEURAL HEARING LOSS, BILATERAL: ICD-10-CM

## 2025-05-12 LAB
EGFR, POC: 59 ML/MIN/1.73M2
POC CREATININE: 1 MG/DL (ref 0.51–1.19)

## 2025-05-12 PROCEDURE — A9579 GAD-BASE MR CONTRAST NOS,1ML: HCPCS | Performed by: NURSE PRACTITIONER

## 2025-05-12 PROCEDURE — 70553 MRI BRAIN STEM W/O & W/DYE: CPT

## 2025-05-12 PROCEDURE — 2500000003 HC RX 250 WO HCPCS: Performed by: NURSE PRACTITIONER

## 2025-05-12 PROCEDURE — 6360000004 HC RX CONTRAST MEDICATION: Performed by: NURSE PRACTITIONER

## 2025-05-12 PROCEDURE — 82565 ASSAY OF CREATININE: CPT

## 2025-05-12 RX ORDER — SODIUM CHLORIDE 0.9 % (FLUSH) 0.9 %
10 SYRINGE (ML) INJECTION PRN
Status: DISCONTINUED | OUTPATIENT
Start: 2025-05-12 | End: 2025-05-15 | Stop reason: HOSPADM

## 2025-05-12 RX ADMIN — SODIUM CHLORIDE, PRESERVATIVE FREE 10 ML: 5 INJECTION INTRAVENOUS at 14:32

## 2025-05-12 RX ADMIN — GADOTERIDOL 15 ML: 279.3 INJECTION, SOLUTION INTRAVENOUS at 14:32

## 2025-06-10 DIAGNOSIS — L29.9 ITCHING: ICD-10-CM

## 2025-06-11 RX ORDER — TRIAMCINOLONE ACETONIDE 5 MG/G
CREAM TOPICAL 3 TIMES DAILY
Qty: 45 G | Refills: 5 | Status: SHIPPED | OUTPATIENT
Start: 2025-06-11

## 2025-08-04 DIAGNOSIS — E03.9 HYPOTHYROIDISM, UNSPECIFIED TYPE: ICD-10-CM

## 2025-08-04 DIAGNOSIS — K90.9 IRON MALABSORPTION: Primary | ICD-10-CM

## 2025-08-05 RX ORDER — LEVOTHYROXINE SODIUM 25 UG/1
25 TABLET ORAL DAILY
Qty: 90 TABLET | Refills: 3 | Status: SHIPPED | OUTPATIENT
Start: 2025-08-05

## 2025-08-22 SDOH — HEALTH STABILITY: PHYSICAL HEALTH: ON AVERAGE, HOW MANY MINUTES DO YOU ENGAGE IN EXERCISE AT THIS LEVEL?: 30 MIN

## 2025-08-22 SDOH — HEALTH STABILITY: PHYSICAL HEALTH: ON AVERAGE, HOW MANY DAYS PER WEEK DO YOU ENGAGE IN MODERATE TO STRENUOUS EXERCISE (LIKE A BRISK WALK)?: 7 DAYS

## 2025-08-22 ASSESSMENT — PATIENT HEALTH QUESTIONNAIRE - PHQ9
1. LITTLE INTEREST OR PLEASURE IN DOING THINGS: NOT AT ALL
SUM OF ALL RESPONSES TO PHQ QUESTIONS 1-9: 1
2. FEELING DOWN, DEPRESSED OR HOPELESS: SEVERAL DAYS
SUM OF ALL RESPONSES TO PHQ QUESTIONS 1-9: 1

## 2025-08-22 ASSESSMENT — LIFESTYLE VARIABLES
HOW MANY STANDARD DRINKS CONTAINING ALCOHOL DO YOU HAVE ON A TYPICAL DAY: PATIENT DOES NOT DRINK
HOW MANY STANDARD DRINKS CONTAINING ALCOHOL DO YOU HAVE ON A TYPICAL DAY: 0
HOW OFTEN DO YOU HAVE SIX OR MORE DRINKS ON ONE OCCASION: 1
HOW OFTEN DO YOU HAVE A DRINK CONTAINING ALCOHOL: NEVER
HOW OFTEN DO YOU HAVE A DRINK CONTAINING ALCOHOL: 1

## 2025-08-25 ENCOUNTER — OFFICE VISIT (OUTPATIENT)
Dept: PRIMARY CARE CLINIC | Age: 75
End: 2025-08-25
Payer: MEDICARE

## 2025-08-25 VITALS
WEIGHT: 180.4 LBS | HEART RATE: 57 BPM | DIASTOLIC BLOOD PRESSURE: 70 MMHG | BODY MASS INDEX: 25.83 KG/M2 | HEIGHT: 70 IN | SYSTOLIC BLOOD PRESSURE: 130 MMHG | OXYGEN SATURATION: 97 %

## 2025-08-25 DIAGNOSIS — Z93.4 JEJUNOSTOMY TUBE PRESENT (HCC): ICD-10-CM

## 2025-08-25 DIAGNOSIS — K90.89 OTHER SPECIFIED INTESTINAL MALABSORPTION: ICD-10-CM

## 2025-08-25 DIAGNOSIS — I10 ESSENTIAL HYPERTENSION: ICD-10-CM

## 2025-08-25 DIAGNOSIS — Z00.00 MEDICARE ANNUAL WELLNESS VISIT, SUBSEQUENT: Primary | ICD-10-CM

## 2025-08-25 DIAGNOSIS — E03.9 HYPOTHYROIDISM, UNSPECIFIED TYPE: ICD-10-CM

## 2025-08-25 DIAGNOSIS — F51.01 PRIMARY INSOMNIA: ICD-10-CM

## 2025-08-25 DIAGNOSIS — Z12.31 SCREENING MAMMOGRAM FOR BREAST CANCER: ICD-10-CM

## 2025-08-25 DIAGNOSIS — F41.9 ANXIETY: ICD-10-CM

## 2025-08-25 PROCEDURE — 3075F SYST BP GE 130 - 139MM HG: CPT | Performed by: NURSE PRACTITIONER

## 2025-08-25 PROCEDURE — 3017F COLORECTAL CA SCREEN DOC REV: CPT | Performed by: NURSE PRACTITIONER

## 2025-08-25 PROCEDURE — 1159F MED LIST DOCD IN RCRD: CPT | Performed by: NURSE PRACTITIONER

## 2025-08-25 PROCEDURE — 1123F ACP DISCUSS/DSCN MKR DOCD: CPT | Performed by: NURSE PRACTITIONER

## 2025-08-25 PROCEDURE — 1160F RVW MEDS BY RX/DR IN RCRD: CPT | Performed by: NURSE PRACTITIONER

## 2025-08-25 PROCEDURE — G0439 PPPS, SUBSEQ VISIT: HCPCS | Performed by: NURSE PRACTITIONER

## 2025-08-25 PROCEDURE — 3078F DIAST BP <80 MM HG: CPT | Performed by: NURSE PRACTITIONER

## 2025-08-25 RX ORDER — LORAZEPAM 1 MG/1
1 TABLET ORAL NIGHTLY PRN
Qty: 90 TABLET | Refills: 0 | Status: SHIPPED | OUTPATIENT
Start: 2025-08-25 | End: 2025-11-23

## 2025-08-25 ASSESSMENT — PATIENT HEALTH QUESTIONNAIRE - PHQ9
4. FEELING TIRED OR HAVING LITTLE ENERGY: NEARLY EVERY DAY
SUM OF ALL RESPONSES TO PHQ QUESTIONS 1-9: 10
SUM OF ALL RESPONSES TO PHQ QUESTIONS 1-9: 10
6. FEELING BAD ABOUT YOURSELF - OR THAT YOU ARE A FAILURE OR HAVE LET YOURSELF OR YOUR FAMILY DOWN: MORE THAN HALF THE DAYS
SUM OF ALL RESPONSES TO PHQ QUESTIONS 1-9: 10
8. MOVING OR SPEAKING SO SLOWLY THAT OTHER PEOPLE COULD HAVE NOTICED. OR THE OPPOSITE, BEING SO FIGETY OR RESTLESS THAT YOU HAVE BEEN MOVING AROUND A LOT MORE THAN USUAL: NOT AT ALL
5. POOR APPETITE OR OVEREATING: NOT AT ALL
10. IF YOU CHECKED OFF ANY PROBLEMS, HOW DIFFICULT HAVE THESE PROBLEMS MADE IT FOR YOU TO DO YOUR WORK, TAKE CARE OF THINGS AT HOME, OR GET ALONG WITH OTHER PEOPLE: NOT DIFFICULT AT ALL
2. FEELING DOWN, DEPRESSED OR HOPELESS: SEVERAL DAYS
SUM OF ALL RESPONSES TO PHQ QUESTIONS 1-9: 10
7. TROUBLE CONCENTRATING ON THINGS, SUCH AS READING THE NEWSPAPER OR WATCHING TELEVISION: SEVERAL DAYS
9. THOUGHTS THAT YOU WOULD BE BETTER OFF DEAD, OR OF HURTING YOURSELF: NOT AT ALL
3. TROUBLE FALLING OR STAYING ASLEEP: NEARLY EVERY DAY
1. LITTLE INTEREST OR PLEASURE IN DOING THINGS: NOT AT ALL

## 2025-08-26 ENCOUNTER — HOSPITAL ENCOUNTER (OUTPATIENT)
Age: 75
Setting detail: SPECIMEN
Discharge: HOME | End: 2025-08-26

## 2025-08-26 DIAGNOSIS — I10 ESSENTIAL HYPERTENSION: ICD-10-CM

## 2025-08-26 DIAGNOSIS — K90.9 IRON MALABSORPTION: ICD-10-CM

## 2025-08-26 DIAGNOSIS — E03.9 HYPOTHYROIDISM, UNSPECIFIED TYPE: ICD-10-CM

## 2025-08-26 LAB
ALBUMIN SERPL-MCNC: 4.2 G/DL (ref 3.5–5.2)
ALBUMIN/GLOB SERPL: 2.1 {RATIO} (ref 1–2.5)
ALP SERPL-CCNC: 125 U/L (ref 35–104)
ALT SERPL-CCNC: 13 U/L (ref 10–35)
ANION GAP SERPL CALCULATED.3IONS-SCNC: 12 MMOL/L (ref 9–16)
AST SERPL-CCNC: 15 U/L (ref 10–35)
BILIRUB SERPL-MCNC: 0.3 MG/DL (ref 0–1.2)
BUN SERPL-MCNC: 19 MG/DL (ref 8–23)
CALCIUM SERPL-MCNC: 9.4 MG/DL (ref 8.6–10.4)
CHLORIDE SERPL-SCNC: 108 MMOL/L (ref 98–107)
CHOLEST SERPL-MCNC: 206 MG/DL (ref 0–199)
CHOLESTEROL/HDL RATIO: 3.2
CO2 SERPL-SCNC: 23 MMOL/L (ref 20–31)
CREAT SERPL-MCNC: 1 MG/DL (ref 0.6–0.9)
ERYTHROCYTE [DISTWIDTH] IN BLOOD BY AUTOMATED COUNT: 15.4 % (ref 11.8–14.4)
FERRITIN SERPL-MCNC: 14 NG/ML
GFR, ESTIMATED: 59 ML/MIN/1.73M2
GLUCOSE SERPL-MCNC: 89 MG/DL (ref 74–99)
HCT VFR BLD AUTO: 32.5 % (ref 36.3–47.1)
HDLC SERPL-MCNC: 65 MG/DL
HGB BLD-MCNC: 10.1 G/DL (ref 11.9–15.1)
IRON SATN MFR SERPL: 19 % (ref 20–55)
IRON SERPL-MCNC: 71 UG/DL (ref 37–145)
LDLC SERPL CALC-MCNC: 119 MG/DL (ref 0–100)
MCH RBC QN AUTO: 29.4 PG (ref 25.2–33.5)
MCHC RBC AUTO-ENTMCNC: 31.1 G/DL (ref 28.4–34.8)
MCV RBC AUTO: 94.5 FL (ref 82.6–102.9)
NRBC BLD-RTO: 0 PER 100 WBC
PLATELET # BLD AUTO: 276 K/UL (ref 138–453)
PMV BLD AUTO: 11.8 FL (ref 8.1–13.5)
POTASSIUM SERPL-SCNC: 4.7 MMOL/L (ref 3.7–5.3)
PROT SERPL-MCNC: 6.2 G/DL (ref 6.6–8.7)
RBC # BLD AUTO: 3.44 M/UL (ref 3.95–5.11)
SODIUM SERPL-SCNC: 143 MMOL/L (ref 136–145)
T4 FREE SERPL-MCNC: 0.9 NG/DL (ref 0.92–1.68)
TIBC SERPL-MCNC: 373 UG/DL (ref 250–450)
TRIGL SERPL-MCNC: 109 MG/DL
TSH SERPL DL<=0.05 MIU/L-ACNC: 3.6 UIU/ML (ref 0.27–4.2)
UNSATURATED IRON BINDING CAPACITY: 302 UG/DL (ref 112–347)
VLDLC SERPL CALC-MCNC: 22 MG/DL (ref 1–30)
WBC OTHER # BLD: 4.6 K/UL (ref 3.5–11.3)

## 2025-08-27 ENCOUNTER — RESULTS FOLLOW-UP (OUTPATIENT)
Dept: PRIMARY CARE CLINIC | Age: 75
End: 2025-08-27

## 2025-09-03 ENCOUNTER — TELEPHONE (OUTPATIENT)
Age: 75
End: 2025-09-03

## (undated) DEVICE — GLOVE ORANGE PI 7   MSG9070

## (undated) DEVICE — SUTURE VCRL + SZ 0 L27IN ABSRB VLT L26MM UR-6 5/8 CIR VCP603H

## (undated) DEVICE — PAD,ABDOMINAL,5"X9",ST,LF,25/BX: Brand: MEDLINE INDUSTRIES, INC.

## (undated) DEVICE — TUBE GASTROSTMY 18FR MED GRD SIL FEED GAM RECESS DST TIP

## (undated) DEVICE — Z DISCONTINUED NO SUB IDED DRAIN PENROSE L12IN 0.25IN USED TO PROMOTE DRNAGE IN OPN

## (undated) DEVICE — GAUZE,SPONGE,FLUFF,6"X6.75",STRL,5/TRAY: Brand: MEDLINE

## (undated) DEVICE — CONTAINER,SPECIMEN,4OZ,OR STRL: Brand: MEDLINE

## (undated) DEVICE — Z DISCONTINUED BY MEDLINE USE 2711682 TRAY SKIN PREP DRY W/ PREM GLV

## (undated) DEVICE — GAUZE,PACKING STRIP,IODOFORM,1/2"X5YD,ST: Brand: CURAD

## (undated) DEVICE — GLOVE SURG SZ 75 L12IN FNGR THK87MIL WHT LTX FREE

## (undated) DEVICE — PROTECTOR ULN NRV PUR FOAM HK LOOP STRP ANATOMICALLY

## (undated) DEVICE — SUTURE VCRL SZ 4-0 L18IN ABSRB UD VCRL POLYGLACTIN 910 COAT J109T

## (undated) DEVICE — SUTURE PROL SZ 0 L30IN NONABSORBABLE BLU SH L26MM 1/2 CIR 8834H

## (undated) DEVICE — TUBE FEED GASTROSTOMY MIC 20FR

## (undated) DEVICE — PACK PROCEDURE SURG GEN MIN SVMMC

## (undated) DEVICE — SOLUTION IV IRRIG POUR BRL 0.9% SODIUM CHL 2F7124

## (undated) DEVICE — KENDALL SCD EXPRESS SLEEVES, KNEE LENGTH, MEDIUM: Brand: KENDALL SCD